# Patient Record
Sex: FEMALE | Race: WHITE | NOT HISPANIC OR LATINO | Employment: OTHER | URBAN - METROPOLITAN AREA
[De-identification: names, ages, dates, MRNs, and addresses within clinical notes are randomized per-mention and may not be internally consistent; named-entity substitution may affect disease eponyms.]

---

## 2017-07-19 ENCOUNTER — HOSPITAL ENCOUNTER (OUTPATIENT)
Dept: MAMMOGRAPHY | Facility: HOSPITAL | Age: 67
Discharge: HOME | End: 2017-07-19
Attending: FAMILY MEDICINE | Admitting: FAMILY MEDICINE

## 2017-08-02 ENCOUNTER — HOSPITAL ENCOUNTER (OUTPATIENT)
Dept: FAMILY MEDICINE CLINIC | Facility: CLINIC | Age: 67
Setting detail: SPECIMEN
Discharge: HOME | End: 2017-08-02
Attending: FAMILY MEDICINE | Admitting: FAMILY MEDICINE

## 2017-08-02 LAB
ALBUMIN SERPL-MCNC: 4.1 G/DL (ref 3.5–4.8)
ALBUMIN/GLOB SERPL: 1.5 {RATIO} (ref 1–1.7)
ALP SERPL-CCNC: 69 IU/L (ref 32–91)
ALT SERPL-CCNC: 43 IU/L (ref 14–54)
ANION GAP SERPL CALC-SCNC: 16.2 MMOL/L (ref 10–20)
AST SERPL-CCNC: 42 IU/L (ref 15–41)
BASOPHILS # BLD AUTO: 0 10*3/UL (ref 0–0.2)
BASOPHILS NFR BLD AUTO: 1 % (ref 0–2)
BILIRUB SERPL-MCNC: 0.6 MG/DL (ref 0.3–1.2)
BILIRUB UR QL STRIP: NEGATIVE MG/DL
BUN SERPL-MCNC: 14 MG/DL (ref 8–20)
BUN/CREAT SERPL: 23.3 (ref 5.4–26.2)
CALCIUM SERPL-MCNC: 9.7 MG/DL (ref 8.9–10.3)
CASTS URNS QL MICRO: NORMAL /[LPF]
CHLORIDE SERPL-SCNC: 100 MMOL/L (ref 101–111)
CHOLEST SERPL-MCNC: 217 MG/DL
CHOLEST/HDLC SERPL: 2.5 {RATIO}
COLOR UR: YELLOW
CONV BACTERIA IN URINE MICRO: NEGATIVE
CONV CLARITY OF URINE: CLEAR
CONV CO2: 31 MMOL/L (ref 22–32)
CONV HYALINE CASTS IN URINE MICRO: 0 /[LPF] (ref 0–5)
CONV LDL CHOLESTEROL DIRECT: 123 MG/DL (ref 0–100)
CONV PROTEIN IN URINE BY AUTOMATED TEST STRIP: NEGATIVE MG/DL
CONV SMALL ROUND CELLS: NORMAL /[HPF]
CONV TOTAL PROTEIN: 6.8 G/DL (ref 6.1–7.9)
CONV UROBILINOGEN IN URINE BY AUTOMATED TEST STRIP: 0.2 MG/DL
CREAT UR-MCNC: 0.6 MG/DL (ref 0.4–1)
CULTURE INDICATED?: NORMAL
DIFFERENTIAL METHOD BLD: (no result)
EOSINOPHIL # BLD AUTO: 0.2 10*3/UL (ref 0–0.3)
EOSINOPHIL # BLD AUTO: 4 % (ref 0–3)
ERYTHROCYTE [DISTWIDTH] IN BLOOD BY AUTOMATED COUNT: 14.3 % (ref 11.5–14.5)
ERYTHROCYTE [SEDIMENTATION RATE] IN BLOOD BY WESTERGREN METHOD: 32 MM/HR (ref 0–30)
GLOBULIN UR ELPH-MCNC: 2.7 G/DL (ref 2.5–3.8)
GLUCOSE SERPL-MCNC: 95 MG/DL (ref 65–99)
GLUCOSE UR QL: NEGATIVE MG/DL
HCT VFR BLD AUTO: 41.3 % (ref 35–49)
HDLC SERPL-MCNC: 86 MG/DL
HGB BLD-MCNC: 13.8 G/DL (ref 12–15)
HGB UR QL STRIP: NEGATIVE
KETONES UR QL STRIP: NEGATIVE MG/DL
LDLC/HDLC SERPL: 1.4 {RATIO}
LEUKOCYTE ESTERASE UR QL STRIP: NEGATIVE
LIPID INTERPRETATION: ABNORMAL
LYMPHOCYTES # BLD AUTO: 1.2 10*3/UL (ref 0.8–4.8)
LYMPHOCYTES NFR BLD AUTO: 27 % (ref 18–42)
MCH RBC QN AUTO: 34.1 PG (ref 26–32)
MCHC RBC AUTO-ENTMCNC: 33.4 G/DL (ref 32–36)
MCV RBC AUTO: 102.3 FL (ref 80–94)
MONOCYTES # BLD AUTO: 0.6 10*3/UL (ref 0.1–1.3)
MONOCYTES NFR BLD AUTO: 14 % (ref 2–11)
NEUTROPHILS # BLD AUTO: 2.4 10*3/UL (ref 2.3–8.6)
NEUTROPHILS NFR BLD AUTO: 54 % (ref 50–75)
NITRITE UR QL STRIP: NEGATIVE
NRBC BLD AUTO-RTO: 0 /100{WBCS}
NRBC/RBC NFR BLD MANUAL: 0 10*3/UL
PH UR STRIP.AUTO: 5.5 [PH] (ref 4.5–8)
PLATELET # BLD AUTO: 202 10*3/UL (ref 150–450)
PMV BLD AUTO: 8.2 FL (ref 7.4–10.4)
POTASSIUM SERPL-SCNC: 4.2 MMOL/L (ref 3.6–5.1)
RBC # BLD AUTO: 4.04 10*6/UL (ref 4–5.4)
RBC #/AREA URNS HPF: 0 /[HPF] (ref 0–3)
SODIUM SERPL-SCNC: 143 MMOL/L (ref 136–144)
SP GR UR: 1.01 (ref 1–1.03)
SPERM URNS QL MICRO: NORMAL /[HPF]
SQUAMOUS SPT QL MICRO: 1 /[HPF] (ref 0–5)
T4 FREE SERPL-MCNC: 0.86 NG/DL (ref 0.58–1.64)
TRIGL SERPL-MCNC: 94 MG/DL
TSH SERPL-ACNC: 3.18 UIU/ML (ref 0.34–5.6)
UNIDENT CRYS URNS QL MICRO: NORMAL /[HPF]
VIT B12 SERPL-MCNC: 568 PG/ML (ref 180–914)
VLDLC SERPL CALC-MCNC: 8.5 MG/DL
WBC # BLD AUTO: 4.4 10*3/UL (ref 4.5–11.5)
WBC #/AREA URNS HPF: 1 /[HPF] (ref 0–5)
YEAST SPEC QL WET PREP: NORMAL /[HPF]

## 2017-08-30 ENCOUNTER — ON CAMPUS - OUTPATIENT (AMBULATORY)
Dept: URBAN - METROPOLITAN AREA HOSPITAL 2 | Facility: HOSPITAL | Age: 67
End: 2017-08-30
Payer: COMMERCIAL

## 2017-08-30 ENCOUNTER — HOSPITAL ENCOUNTER (OUTPATIENT)
Dept: OTHER | Facility: HOSPITAL | Age: 67
Setting detail: SPECIMEN
Discharge: HOME | End: 2017-08-30
Attending: INTERNAL MEDICINE | Admitting: INTERNAL MEDICINE

## 2017-08-30 ENCOUNTER — OFFICE (AMBULATORY)
Dept: URBAN - METROPOLITAN AREA CLINIC 64 | Facility: CLINIC | Age: 67
End: 2017-08-30
Payer: COMMERCIAL

## 2017-08-30 VITALS
SYSTOLIC BLOOD PRESSURE: 136 MMHG | HEART RATE: 80 BPM | SYSTOLIC BLOOD PRESSURE: 130 MMHG | SYSTOLIC BLOOD PRESSURE: 127 MMHG | OXYGEN SATURATION: 99 % | DIASTOLIC BLOOD PRESSURE: 77 MMHG | DIASTOLIC BLOOD PRESSURE: 82 MMHG | WEIGHT: 148 LBS | HEART RATE: 86 BPM | RESPIRATION RATE: 15 BRPM | DIASTOLIC BLOOD PRESSURE: 72 MMHG | HEART RATE: 82 BPM | DIASTOLIC BLOOD PRESSURE: 84 MMHG | HEART RATE: 78 BPM | RESPIRATION RATE: 18 BRPM | OXYGEN SATURATION: 93 % | HEART RATE: 77 BPM | SYSTOLIC BLOOD PRESSURE: 148 MMHG | TEMPERATURE: 97.2 F | HEART RATE: 79 BPM | OXYGEN SATURATION: 96 % | OXYGEN SATURATION: 98 % | SYSTOLIC BLOOD PRESSURE: 137 MMHG | HEART RATE: 74 BPM | SYSTOLIC BLOOD PRESSURE: 122 MMHG | OXYGEN SATURATION: 97 % | HEIGHT: 61 IN | OXYGEN SATURATION: 94 % | SYSTOLIC BLOOD PRESSURE: 119 MMHG | SYSTOLIC BLOOD PRESSURE: 131 MMHG | RESPIRATION RATE: 16 BRPM | OXYGEN SATURATION: 92 % | DIASTOLIC BLOOD PRESSURE: 68 MMHG | DIASTOLIC BLOOD PRESSURE: 65 MMHG | DIASTOLIC BLOOD PRESSURE: 75 MMHG | SYSTOLIC BLOOD PRESSURE: 117 MMHG | OXYGEN SATURATION: 90 %

## 2017-08-30 DIAGNOSIS — K57.30 DIVERTICULOSIS OF LARGE INTESTINE WITHOUT PERFORATION OR ABS: ICD-10-CM

## 2017-08-30 DIAGNOSIS — Z12.11 ENCOUNTER FOR SCREENING FOR MALIGNANT NEOPLASM OF COLON: ICD-10-CM

## 2017-08-30 DIAGNOSIS — D12.5 BENIGN NEOPLASM OF SIGMOID COLON: ICD-10-CM

## 2017-08-30 DIAGNOSIS — D12.2 BENIGN NEOPLASM OF ASCENDING COLON: ICD-10-CM

## 2017-08-30 DIAGNOSIS — D12.3 BENIGN NEOPLASM OF TRANSVERSE COLON: ICD-10-CM

## 2017-08-30 LAB
GI HISTOLOGY: A. UNSPECIFIED: (no result)
GI HISTOLOGY: B. UNSPECIFIED: (no result)
GI HISTOLOGY: C. UNSPECIFIED: (no result)
GI HISTOLOGY: D. UNSPECIFIED: (no result)
GI HISTOLOGY: PDF REPORT: (no result)

## 2017-08-30 PROCEDURE — 88305 TISSUE EXAM BY PATHOLOGIST: CPT | Mod: 26 | Performed by: INTERNAL MEDICINE

## 2017-08-30 PROCEDURE — 45380 COLONOSCOPY AND BIOPSY: CPT | Mod: 59 | Performed by: INTERNAL MEDICINE

## 2017-08-30 PROCEDURE — 45385 COLONOSCOPY W/LESION REMOVAL: CPT | Mod: PT | Performed by: INTERNAL MEDICINE

## 2017-08-30 RX ADMIN — PROPOFOL: 10 INJECTION, EMULSION INTRAVENOUS at 08:14

## 2017-10-02 ENCOUNTER — HOSPITAL ENCOUNTER (OUTPATIENT)
Dept: OTHER | Facility: HOSPITAL | Age: 67
Setting detail: SPECIMEN
Discharge: HOME | End: 2017-10-02
Attending: OTOLARYNGOLOGY | Admitting: OTOLARYNGOLOGY

## 2018-05-30 ENCOUNTER — HOSPITAL ENCOUNTER (OUTPATIENT)
Dept: CT IMAGING | Facility: HOSPITAL | Age: 68
Discharge: HOME | End: 2018-05-30
Attending: FAMILY MEDICINE | Admitting: FAMILY MEDICINE

## 2018-05-30 LAB — CREAT BLDA-MCNC: 0.6 MG/DL (ref 0.6–1.3)

## 2018-06-06 ENCOUNTER — HOSPITAL ENCOUNTER (OUTPATIENT)
Dept: FAMILY MEDICINE CLINIC | Facility: CLINIC | Age: 68
Setting detail: SPECIMEN
Discharge: HOME | End: 2018-06-06
Attending: HOSPITALIST | Admitting: HOSPITALIST

## 2018-06-06 LAB
ALBUMIN SERPL-MCNC: 4.1 G/DL (ref 3.5–4.8)
ALBUMIN/GLOB SERPL: 1.5 {RATIO} (ref 1–1.7)
ALP SERPL-CCNC: 79 IU/L (ref 32–91)
ALT SERPL-CCNC: 24 IU/L (ref 14–54)
ANION GAP SERPL CALC-SCNC: 14.4 MMOL/L (ref 10–20)
AST SERPL-CCNC: 28 IU/L (ref 15–41)
BASOPHILS # BLD AUTO: 0.1 10*3/UL (ref 0–0.2)
BASOPHILS NFR BLD AUTO: 1 % (ref 0–2)
BILIRUB SERPL-MCNC: 0.5 MG/DL (ref 0.3–1.2)
BUN SERPL-MCNC: 14 MG/DL (ref 8–20)
BUN/CREAT SERPL: 23.3 (ref 5.4–26.2)
CALCIUM SERPL-MCNC: 9.1 MG/DL (ref 8.9–10.3)
CASTS URNS QL MICRO: NORMAL /[LPF]
CHLORIDE SERPL-SCNC: 100 MMOL/L (ref 101–111)
CHOLEST SERPL-MCNC: 251 MG/DL
CHOLEST/HDLC SERPL: 3.7 {RATIO}
CONV BACTERIA IN URINE MICRO: NEGATIVE
CONV CO2: 29 MMOL/L (ref 22–32)
CONV HYALINE CASTS IN URINE MICRO: 3 /[LPF] (ref 0–5)
CONV LDL CHOLESTEROL DIRECT: 144 MG/DL (ref 0–100)
CONV SMALL ROUND CELLS: NORMAL /[HPF]
CONV TOTAL PROTEIN: 6.8 G/DL (ref 6.1–7.9)
CREAT UR-MCNC: 0.6 MG/DL (ref 0.4–1)
DIFFERENTIAL METHOD BLD: (no result)
EOSINOPHIL # BLD AUTO: 0.2 10*3/UL (ref 0–0.3)
EOSINOPHIL # BLD AUTO: 4 % (ref 0–3)
ERYTHROCYTE [DISTWIDTH] IN BLOOD BY AUTOMATED COUNT: 13.2 % (ref 11.5–14.5)
GLOBULIN UR ELPH-MCNC: 2.7 G/DL (ref 2.5–3.8)
GLUCOSE SERPL-MCNC: 103 MG/DL (ref 65–99)
HCT VFR BLD AUTO: 39.9 % (ref 35–49)
HDLC SERPL-MCNC: 68 MG/DL
HGB BLD-MCNC: 13.6 G/DL (ref 12–15)
LDLC/HDLC SERPL: 2.1 {RATIO}
LIPID INTERPRETATION: ABNORMAL
LYMPHOCYTES # BLD AUTO: 1.1 10*3/UL (ref 0.8–4.8)
LYMPHOCYTES NFR BLD AUTO: 24 % (ref 18–42)
MCH RBC QN AUTO: 34 PG (ref 26–32)
MCHC RBC AUTO-ENTMCNC: 34.1 G/DL (ref 32–36)
MCV RBC AUTO: 99.7 FL (ref 80–94)
MONOCYTES # BLD AUTO: 0.8 10*3/UL (ref 0.1–1.3)
MONOCYTES NFR BLD AUTO: 16 % (ref 2–11)
NEUTROPHILS # BLD AUTO: 2.7 10*3/UL (ref 2.3–8.6)
NEUTROPHILS NFR BLD AUTO: 55 % (ref 50–75)
NRBC BLD AUTO-RTO: 0 /100{WBCS}
NRBC/RBC NFR BLD MANUAL: 0 10*3/UL
PLATELET # BLD AUTO: 197 10*3/UL (ref 150–450)
PMV BLD AUTO: 7.6 FL (ref 7.4–10.4)
POTASSIUM SERPL-SCNC: 3.4 MMOL/L (ref 3.6–5.1)
RBC # BLD AUTO: 4 10*6/UL (ref 4–5.4)
RBC #/AREA URNS HPF: 1 /[HPF] (ref 0–3)
SODIUM SERPL-SCNC: 140 MMOL/L (ref 136–144)
SPERM URNS QL MICRO: NORMAL /[HPF]
SQUAMOUS SPT QL MICRO: 3 /[HPF] (ref 0–5)
TRIGL SERPL-MCNC: 255 MG/DL
UNIDENT CRYS URNS QL MICRO: NORMAL /[HPF]
VLDLC SERPL CALC-MCNC: 39.4 MG/DL
WBC # BLD AUTO: 4.9 10*3/UL (ref 4.5–11.5)
WBC #/AREA URNS HPF: 1 /[HPF] (ref 0–5)
YEAST SPEC QL WET PREP: NORMAL /[HPF]

## 2018-08-07 ENCOUNTER — HOSPITAL ENCOUNTER (OUTPATIENT)
Dept: MAMMOGRAPHY | Facility: HOSPITAL | Age: 68
Discharge: HOME | End: 2018-08-07

## 2019-08-12 ENCOUNTER — TRANSCRIBE ORDERS (OUTPATIENT)
Dept: MAMMOGRAPHY | Facility: HOSPITAL | Age: 69
End: 2019-08-12

## 2019-08-12 DIAGNOSIS — Z12.39 SCREENING BREAST EXAMINATION: Primary | ICD-10-CM

## 2019-08-19 ENCOUNTER — HOSPITAL ENCOUNTER (OUTPATIENT)
Dept: MAMMOGRAPHY | Facility: HOSPITAL | Age: 69
Discharge: HOME | End: 2019-08-19
Admitting: FAMILY MEDICINE

## 2019-08-19 DIAGNOSIS — Z12.39 SCREENING BREAST EXAMINATION: ICD-10-CM

## 2019-08-19 PROCEDURE — 77063 BREAST TOMOSYNTHESIS BI: CPT

## 2019-08-19 PROCEDURE — 77067 SCR MAMMO BI INCL CAD: CPT

## 2019-08-21 ENCOUNTER — OFFICE VISIT (OUTPATIENT)
Dept: FAMILY MEDICINE CLINIC | Facility: CLINIC | Age: 69
End: 2019-08-21

## 2019-08-21 VITALS
HEIGHT: 62 IN | SYSTOLIC BLOOD PRESSURE: 124 MMHG | DIASTOLIC BLOOD PRESSURE: 88 MMHG | WEIGHT: 172 LBS | TEMPERATURE: 98.2 F | OXYGEN SATURATION: 91 % | BODY MASS INDEX: 31.65 KG/M2 | HEART RATE: 89 BPM | RESPIRATION RATE: 16 BRPM

## 2019-08-21 DIAGNOSIS — I10 ESSENTIAL HYPERTENSION: ICD-10-CM

## 2019-08-21 DIAGNOSIS — E78.2 MIXED HYPERLIPIDEMIA: ICD-10-CM

## 2019-08-21 DIAGNOSIS — M1A.0720 CHRONIC IDIOPATHIC GOUT INVOLVING TOE OF LEFT FOOT WITHOUT TOPHUS: ICD-10-CM

## 2019-08-21 DIAGNOSIS — I87.2 PERIPHERAL VENOUS INSUFFICIENCY: ICD-10-CM

## 2019-08-21 DIAGNOSIS — J68.3 REACTIVE AIRWAYS DYSFUNCTION SYNDROME (HCC): ICD-10-CM

## 2019-08-21 DIAGNOSIS — Z00.00 MEDICARE ANNUAL WELLNESS VISIT, SUBSEQUENT: Primary | ICD-10-CM

## 2019-08-21 DIAGNOSIS — R79.9 ABNORMAL FINDING OF BLOOD CHEMISTRY: ICD-10-CM

## 2019-08-21 PROBLEM — H61.23 BILATERAL IMPACTED CERUMEN: Status: ACTIVE | Noted: 2017-04-10

## 2019-08-21 PROBLEM — E78.5 HYPERLIPIDEMIA: Status: ACTIVE | Noted: 2019-08-21

## 2019-08-21 PROBLEM — N60.19 FIBROCYSTIC BREAST: Status: ACTIVE | Noted: 2019-08-21

## 2019-08-21 PROBLEM — K57.92 DIVERTICULITIS: Status: ACTIVE | Noted: 2018-05-16

## 2019-08-21 PROCEDURE — 84550 ASSAY OF BLOOD/URIC ACID: CPT | Performed by: FAMILY MEDICINE

## 2019-08-21 PROCEDURE — G0439 PPPS, SUBSEQ VISIT: HCPCS | Performed by: FAMILY MEDICINE

## 2019-08-21 PROCEDURE — 99213 OFFICE O/P EST LOW 20 MIN: CPT | Performed by: FAMILY MEDICINE

## 2019-08-21 PROCEDURE — 82607 VITAMIN B-12: CPT | Performed by: FAMILY MEDICINE

## 2019-08-21 PROCEDURE — 80061 LIPID PANEL: CPT | Performed by: FAMILY MEDICINE

## 2019-08-21 PROCEDURE — 83036 HEMOGLOBIN GLYCOSYLATED A1C: CPT | Performed by: FAMILY MEDICINE

## 2019-08-21 RX ORDER — CITALOPRAM 20 MG/1
TABLET ORAL
COMMUNITY
Start: 2019-05-18 | End: 2020-04-10

## 2019-08-21 RX ORDER — OMEPRAZOLE 40 MG/1
CAPSULE, DELAYED RELEASE ORAL
COMMUNITY
Start: 2018-05-16 | End: 2019-12-10

## 2019-08-21 RX ORDER — ALLOPURINOL 300 MG/1
TABLET ORAL
COMMUNITY
Start: 2019-06-10 | End: 2020-01-06

## 2019-08-21 RX ORDER — RANITIDINE 300 MG/1
TABLET ORAL
COMMUNITY
Start: 2019-06-10 | End: 2019-12-10

## 2019-08-21 RX ORDER — METOPROLOL SUCCINATE 100 MG/1
TABLET, EXTENDED RELEASE ORAL
COMMUNITY
Start: 2019-08-16 | End: 2020-01-06

## 2019-08-21 RX ORDER — ATORVASTATIN CALCIUM 20 MG/1
TABLET, FILM COATED ORAL
COMMUNITY
Start: 2019-05-18 | End: 2020-04-10

## 2019-08-21 RX ORDER — POTASSIUM CHLORIDE 750 MG/1
20 TABLET, FILM COATED, EXTENDED RELEASE ORAL 2 TIMES DAILY
COMMUNITY
End: 2021-08-27

## 2019-08-21 RX ORDER — FUROSEMIDE 40 MG/1
TABLET ORAL
COMMUNITY
Start: 2018-02-03 | End: 2020-07-22

## 2019-08-21 RX ORDER — MONTELUKAST SODIUM 10 MG/1
TABLET ORAL
COMMUNITY
Start: 2019-06-10 | End: 2020-01-06

## 2019-08-21 RX ORDER — BUDESONIDE AND FORMOTEROL FUMARATE DIHYDRATE 160; 4.5 UG/1; UG/1
AEROSOL RESPIRATORY (INHALATION)
COMMUNITY
Start: 2018-02-03 | End: 2020-11-19 | Stop reason: SDUPTHER

## 2019-08-21 NOTE — PROGRESS NOTES
The ABCs of the Annual Wellness Visit  Subsequent Medicare Wellness Visit    Chief Complaint   Patient presents with   • Medicare Wellness-subsequent       Subjective   History of Present Illness:  Lori Sutton is a 69 y.o. female who presents for a Subsequent Medicare Wellness Visit.  She is generally had a very good year.  Her screening interview today indicates an occasional day or 2 when she feels lonely and may be a little down in the depression stage but she quickly comes out of that within a day or 2.  She is  and would naturally be lonely at times.  She has many friends though some of them are also  and they have become very active with card groups and they are starting to travel together and more and more activities are being put together for them.  She is doing better since her social life has improved.  Her mammogram and colonoscopy and labs were all up-to-date.  Immunizations will be up-to-date after today.        Patient is also here today for a physical exam and to review of the medical problems that we follow her for in the office.This includes hypertension, hyperlipidemia, peripheral venous insufficiency, chronic pulmonary disease from previous cigarette smoking, reactive airway disease, irritable bowel syndrome, degenerative joint disease, fibrocystic breast, and gout.  She will have an exam today and we will draw some laboratory test to evaluate her medical problems and make adjustments in her medicines.  We will review her medicines today.  Even though she has extensive medical problems as listed above she has quit smoking and she generally feels well.  Recently she had some procedures done on her legs in an attempt to redirect her venous return.  She did this on her own and I am not sure what group she is actually using.  Apparently they are using some form of ultrasound to redirect venous flow.  I told her that she can finish the treatment and then if it does not seem to help we  will ask her to see our vascular surgeons and see if they can help her with the same problem.  If it does help then we will appreciate their work.      HEALTH RISK ASSESSMENT    Recent Hospitalizations:  No hospitalization(s) within the last year.    Current Medical Providers:  Patient Care Team:  Ayaz Luna MD as PCP - General  Keya Ash APRN as PCP - Amanda Gonzalez MD as Consulting Physician (Plastic Surgery)    Smoking Status:  Social History     Tobacco Use   Smoking Status Former Smoker   • Years: 40.00   • Last attempt to quit: 2006   • Years since quittin.6   Smokeless Tobacco Never Used       Alcohol Consumption:  Social History     Substance and Sexual Activity   Alcohol Use Yes    Comment: daily, vodka tonic       Depression Screen:   PHQ-2/PHQ-9 Depression Screening 2019   Little interest or pleasure in doing things 0   Feeling down, depressed, or hopeless 0   Total Score 0       Fall Risk Screen:  JHOANA Fall Risk Assessment was completed, and patient is at MODERATE risk for falls. Assessment completed on:2019    Health Habits and Functional and Cognitive Screening:  Functional & Cognitive Status 2019   Do you have difficulty preparing food and eating? No   Do you have difficulty bathing yourself, getting dressed or grooming yourself? No   Do you have difficulty using the toilet? No   Do you have trouble with steps or getting out of a bed or a chair? No   Current Diet Well Balanced Diet   Dental Exam Up to date   Eye Exam Up to date   Exercise (times per week) 3 times per week   Current Exercise Activities Include Housecleaning   Do you need help using the phone?  No   Are you deaf or do you have serious difficulty hearing?  Yes   Do you need help with transportation? No   Do you need help shopping? No   Do you need help preparing meals?  No   Do you need help with housework?  No   Do you need help with laundry? No   Do you need help  taking your medications? No   Do you need help managing money? No   Do you ever drive or ride in a car without wearing a seat belt? No   Have you felt unusual stress, anger or loneliness in the last month? Yes   Who do you live with? Alone   If you need help, do you have trouble finding someone available to you? No   Have you been bothered in the last four weeks by sexual problems? No   Do you have difficulty concentrating, remembering or making decisions? No         Does the patient have evidence of cognitive impairment? No    Asprin use counseling:Does not need ASA (and currently is not on it)    Age-appropriate Screening Schedule:  Refer to the list below for future screening recommendations based on patient's age, sex and/or medical conditions. Orders for these recommended tests are listed in the plan section. The patient has been provided with a written plan.    Health Maintenance   Topic Date Due   • TDAP/TD VACCINES (1 - Tdap) 02/08/1969   • PNEUMOCOCCAL VACCINES (65+ LOW/MEDIUM RISK) (2 of 2 - PPSV23) 05/16/2017   • LIPID PANEL  08/21/2019   • INFLUENZA VACCINE  08/01/2019   • ZOSTER VACCINE (2 of 2) 09/18/2019   • COLONOSCOPY  08/30/2027          The following portions of the patient's history were reviewed and updated as appropriate: allergies, current medications, past family history, past medical history, past social history, past surgical history and problem list.    Outpatient Medications Prior to Visit   Medication Sig Dispense Refill   • allopurinol (ZYLOPRIM) 300 MG tablet      • atorvastatin (LIPITOR) 20 MG tablet      • B Complex Vitamins (VITAMIN B COMPLEX PO) VITAMIN B COMPLEX TABS     • budesonide-formoterol (SYMBICORT) 160-4.5 MCG/ACT inhaler SYMBICORT 160-4.5 MCG/ACT AERO     • Cholecalciferol 1000 units tablet VITAMIN D TABS     • citalopram (CeleXA) 20 MG tablet      • metoprolol succinate XL (TOPROL-XL) 100 MG 24 hr tablet      • montelukast (SINGULAIR) 10 MG tablet      • omeprazole  "(priLOSEC) 40 MG capsule OMEPRAZOLE 40 MG CPDR     • raNITIdine (ZANTAC) 300 MG tablet      • vitamin E 100 UNIT capsule VITAMIN E CAPS     • furosemide (LASIX) 40 MG tablet FUROSEMIDE 40 MG TABS     • potassium chloride (K-DUR) 10 MEQ CR tablet Take 20 mEq by mouth 2 (Two) Times a Day.       No facility-administered medications prior to visit.        Patient Active Problem List   Diagnosis   • Medicare annual wellness visit, subsequent   • Hypertension   • Hyperlipidemia   • Fibrocystic breast   • Back pain   • Bilateral impacted cerumen   • Colon polyps   • Depression   • Diverticulitis   • Edema   • Gout   • Irritable bowel syndrome   • Lymphedema of lower extremity   • Peripheral venous insufficiency   • Polyosteoarthritis   • Reactive airways dysfunction syndrome (CMS/HCC)       Advanced Care Planning:  Patient has an advance directive - a copy has been provided and is visible in patient header    Review of Systems   Constitutional: Negative.    All other systems reviewed and are negative.      Compared to one year ago, the patient feels her physical health is worse.  Compared to one year ago, the patient feels her mental health is the same.    Reviewed chart for potential of high risk medication in the elderly: yes  Reviewed chart for potential of harmful drug interactions in the elderly:yes    Objective         Vitals:    08/21/19 1552   BP: 124/88   BP Location: Right arm   Cuff Size: Large Adult   Pulse: 89   Resp: 16   Temp: 98.2 °F (36.8 °C)   TempSrc: Oral   SpO2: 91%   Weight: 78 kg (172 lb)   Height: 157.5 cm (62\")   PainSc: 0-No pain       Body mass index is 31.46 kg/m².  Discussed the patient's BMI with her. The BMI is above average; BMI management plan is completed.    Physical Exam   Constitutional: She is oriented to person, place, and time. She appears well-developed and well-nourished.   HENT:   Head: Normocephalic and atraumatic.   Right Ear: External ear normal.   Left Ear: External ear " normal.   Nose: Nose normal.   Mouth/Throat: Oropharynx is clear and moist.   Eyes: Conjunctivae and EOM are normal. Pupils are equal, round, and reactive to light.   Neck: Normal range of motion.   Cardiovascular: Normal rate, regular rhythm, normal heart sounds and intact distal pulses.   Pulmonary/Chest: Effort normal and breath sounds normal.   Abdominal: Soft. Bowel sounds are normal.   Musculoskeletal: Normal range of motion.   Patient has osteoarthritic changes in her hands hips knees ankles and feet.  She has varicose veins in both of her legs along with edema.  She has varicose veins causing a certain degree of venous stasis in her lower extremities.  She has no ulcerations.   Neurological: She is alert and oriented to person, place, and time.   Skin: Skin is warm and dry.   Psychiatric: She has a normal mood and affect. Her behavior is normal. Judgment and thought content normal.   Vitals reviewed.            Assessment/Plan   Medicare Risks and Personalized Health Plan  CMS Preventative Services Quick Reference  Alcohol Misuse  Breast Cancer/Mammogram Screening  Colon Cancer Screening  Obesity/Overweight     The above risks/problems have been discussed with the patient.  Pertinent information has been shared with the patient in the After Visit Summary.  Follow up plans and orders are seen below in the Assessment/Plan Section.    Diagnoses and all orders for this visit:    1. Medicare annual wellness visit, subsequent (Primary)  Assessment & Plan:  Patient is here today for her Medicare wellness exam.  We have reviewed her past medical history and have gone through the various screens required for this exam.  She will be up-to-date after today for the exam itself and labs will be drawn today.  She will be up-to-date with her colonoscopy and with her mammogram.  Immunizations have been reviewed today and plans have been made to update and catch her up in this area.  Overall she is doing very well and is  staying active and involved with life.    Orders:  -     CT Chest Without Contrast; Future  -     TSH; Future  -     T4, Free; Future  -     Vitamin B12  -     Comprehensive Metabolic Panel; Future  -     CBC & Differential; Future  -     Lipid Panel  -     Hemoglobin A1c  -     Uric Acid    2. Mixed hyperlipidemia  Assessment & Plan:  Lipid abnormalities are improving with treatment.  Nutritional counseling was provided., The patient was referred to the dietician. and Pharmacotherapy as ordered.  Lipids will be reassessed in 1 year.  Continue atorvastatin 20 mg a day    Orders:  -     CT Chest Without Contrast; Future  -     TSH; Future  -     T4, Free; Future  -     Vitamin B12  -     Comprehensive Metabolic Panel; Future  -     CBC & Differential; Future  -     Lipid Panel  -     Hemoglobin A1c  -     Uric Acid    3. Essential hypertension  Assessment & Plan:  Hypertension is improving with treatment.  Continue current treatment regimen.  Dietary sodium restriction.  Weight loss.  Regular aerobic exercise.  Stop smoking.  Continue current medications.  Blood pressure will be reassessed at the next regular appointment.  Continue blood pressure medications in the form of metoprolol succinate 100 mg a day    Orders:  -     CT Chest Without Contrast; Future  -     TSH; Future  -     T4, Free; Future  -     Vitamin B12  -     Comprehensive Metabolic Panel; Future  -     CBC & Differential; Future  -     Lipid Panel  -     Hemoglobin A1c  -     Uric Acid    4. Peripheral venous insufficiency  Assessment & Plan:  Weight loss  Compression stockings  Elevate legs  Patient is undergoing a procedure to redirect venous flow.  If this is successful she should see some improvement in the next 4 to 6 months.    Orders:  -     CT Chest Without Contrast; Future  -     TSH; Future  -     T4, Free; Future  -     Vitamin B12  -     Comprehensive Metabolic Panel; Future  -     CBC & Differential; Future  -     Lipid Panel  -      Hemoglobin A1c  -     Uric Acid    5. Reactive airways dysfunction syndrome (CMS/HCC)  Assessment & Plan:  Patient no longer smokes so her airways are healing.  She continues to have some wheezing.  She takes Singulair 10 mg a day and she takes Symbicort inhaler twice a day.  We will order a screening CT scan without contrast because of her previous history of cigarette smoking.  She is accumulated a 40-pack-year history.  She is a candidate for the CT screening.    Orders:  -     CT Chest Without Contrast; Future  -     TSH; Future  -     T4, Free; Future  -     Vitamin B12  -     Comprehensive Metabolic Panel; Future  -     CBC & Differential; Future  -     Lipid Panel  -     Hemoglobin A1c  -     Uric Acid    6. Chronic idiopathic gout involving toe of left foot without tophus  Assessment & Plan:  Patient has a history of gout and is currently taking allopurinol.  Since she has been on that she has not had any gout attacks.    Orders:  -     CT Chest Without Contrast; Future  -     TSH; Future  -     T4, Free; Future  -     Vitamin B12  -     Comprehensive Metabolic Panel; Future  -     CBC & Differential; Future  -     Lipid Panel  -     Hemoglobin A1c  -     Uric Acid    7. Abnormal finding of blood chemistry   -     Hemoglobin A1c    Follow Up:  Return in about 1 year (around 8/21/2020) for Medicare Wellness.     An After Visit Summary and PPPS were given to the patient.

## 2019-08-21 NOTE — ASSESSMENT & PLAN NOTE
Patient has a history of gout and is currently taking allopurinol.  Since she has been on that she has not had any gout attacks.

## 2019-08-21 NOTE — ASSESSMENT & PLAN NOTE
Weight loss  Compression stockings  Elevate legs  Patient is undergoing a procedure to redirect venous flow.  If this is successful she should see some improvement in the next 4 to 6 months.

## 2019-08-21 NOTE — ASSESSMENT & PLAN NOTE
Patient is here today for her Medicare wellness exam.  We have reviewed her past medical history and have gone through the various screens required for this exam.  She will be up-to-date after today for the exam itself and labs will be drawn today.  She will be up-to-date with her colonoscopy and with her mammogram.  Immunizations have been reviewed today and plans have been made to update and catch her up in this area.  Overall she is doing very well and is staying active and involved with life.

## 2019-08-21 NOTE — ASSESSMENT & PLAN NOTE
Patient no longer smokes so her airways are healing.  She continues to have some wheezing.  She takes Singulair 10 mg a day and she takes Symbicort inhaler twice a day.  We will order a screening CT scan without contrast because of her previous history of cigarette smoking.  She is accumulated a 40-pack-year history.  She is a candidate for the CT screening.

## 2019-08-21 NOTE — ASSESSMENT & PLAN NOTE
Hypertension is improving with treatment.  Continue current treatment regimen.  Dietary sodium restriction.  Weight loss.  Regular aerobic exercise.  Stop smoking.  Continue current medications.  Blood pressure will be reassessed at the next regular appointment.  Continue blood pressure medications in the form of metoprolol succinate 100 mg a day

## 2019-08-21 NOTE — PATIENT INSTRUCTIONS
Medicare Wellness  Personal Prevention Plan of Service     Date of Office Visit:  2019  Encounter Provider:  Ayaz Luna MD  Place of Service:  [facility]  Patient Name: Lori Sutton  :  1950    As part of the Medicare Wellness portion of your visit today, we are providing you with this personalized preventive plan of services (PPPS). This plan is based upon recommendations of the [place] Preventive Services Task Force (USPSTF) and the Advisory Committee on Immunization Practices (ACIP).    This lists the preventive care services that should be considered, and provides dates of when you are due. Items listed as completed are up-to-date and do not require any further intervention.    Health Maintenance   Topic Date Due   • TDAP/TD VACCINES (1 - Tdap) 1967   • PNEUMOCOCCAL VACCINES (65+ LOW/MEDIUM RISK) (2 of 2 - PPSV23) 2017   • HEPATITIS C SCREENING  2019   • MEDICARE ANNUAL WELLNESS  2019   • LIPID PANEL  2019   • INFLUENZA VACCINE  2019   • ZOSTER VACCINE (2 of 2) 2019   • COLONOSCOPY  2027       No orders of the defined types were placed in this encounter.      Return in about 1 year (around 2020) for Medicare Wellness.          Carbohydrate Counting for Diabetes Mellitus, Adult    Carbohydrate counting is a method of keeping track of how many carbohydrates you eat. Eating carbohydrates naturally increases the amount of sugar (glucose) in the blood. Counting how many carbohydrates you eat helps keep your blood glucose within normal limits, which helps you manage your diabetes (diabetes mellitus).  It is important to know how many carbohydrates you can safely have in each meal. This is different for every person. A diet and nutrition specialist (registered dietitian) can help you make a meal plan and calculate how many carbohydrates you should have at each meal and snack.  Carbohydrates are found  "in the following foods:  · Grains, such as breads and cereals.  · Dried beans and soy products.  · Starchy vegetables, such as potatoes, peas, and corn.  · Fruit and fruit juices.  · Milk and yogurt.  · Sweets and snack foods, such as cake, cookies, candy, chips, and soft drinks.  How do I count carbohydrates?  There are two ways to count carbohydrates in food. You can use either of the methods or a combination of both.  Reading \"Nutrition Facts\" on packaged food  The \"Nutrition Facts\" list is included on the labels of almost all packaged foods and beverages in the [place] It includes:  · The serving size.  · Information about nutrients in each serving, including the grams (g) of carbohydrate per serving.  To use the “Nutrition Facts\":  · Decide how many servings you will have.  · Multiply the number of servings by the number of carbohydrates per serving.  · The resulting number is the total amount of carbohydrates that you will be having.  Learning standard serving sizes of other foods  When you eat carbohydrate foods that are not packaged or do not include \"Nutrition Facts\" on the label, you need to measure the servings in order to count the amount of carbohydrates:  · Measure the foods that you will eat with a food scale or measuring cup, if needed.  · Decide how many standard-size servings you will eat.  · Multiply the number of servings by 15. Most carbohydrate-rich foods have about 15 g of carbohydrates per serving.  ? For example, if you eat 8 oz (170 g) of strawberries, you will have eaten 2 servings and 30 g of carbohydrates (2 servings x 15 g = 30 g).  · For foods that have more than one food mixed, such as soups and casseroles, you must count the carbohydrates in each food that is included.  The following list contains standard serving sizes of common carbohydrate-rich foods. Each of these servings has about 15 g of carbohydrates:  · ½ hamburger bun or ½ English muffin.  · ½ oz (15 mL) syrup.  · ½ oz (14 g) " jelly.  · 1 slice of bread.  · 1 six-inch tortilla.  · 3 oz (85 g) cooked rice or pasta.  · 4 oz (113 g) cooked dried beans.  · 4 oz (113 g) starchy vegetable, such as peas, corn, or potatoes.  · 4 oz (113 g) hot cereal.  · 4 oz (113 g) mashed potatoes or ¼ of a large baked potato.  · 4 oz (113 g) canned or frozen fruit.  · 4 oz (120 mL) fruit juice.  · 4-6 crackers.  · 6 chicken nuggets.  · 6 oz (170 g) unsweetened dry cereal.  · 6 oz (170 g) plain fat-free yogurt or yogurt sweetened with artificial sweeteners.  · 8 oz (240 mL) milk.  · 8 oz (170 g) fresh fruit or one small piece of fruit.  · 24 oz (680 g) popped popcorn.  Example of carbohydrate counting  Sample meal  · 3 oz (85 g) chicken breast.  · 6 oz (170 g) brown rice.  · 4 oz (113 g) corn.  · 8 oz (240 mL) milk.  · 8 oz (170 g) strawberries with sugar-free whipped topping.  Carbohydrate calculation  1. Identify the foods that contain carbohydrates:  ? Rice.  ? Corn.  ? Milk.  ? Strawberries.  2. Calculate how many servings you have of each food:  ? 2 servings rice.  ? 1 serving corn.  ? 1 serving milk.  ? 1 serving strawberries.  3. Multiply each number of servings by 15 g:  ? 2 servings rice x 15 g = 30 g.  ? 1 serving corn x 15 g = 15 g.  ? 1 serving milk x 15 g = 15 g.  ? 1 serving strawberries x 15 g = 15 g.  4. Add together all of the amounts to find the total grams of carbohydrates eaten:  ? 30 g + 15 g + 15 g + 15 g = 75 g of carbohydrates total.  Summary  · Carbohydrate counting is a method of keeping track of how many carbohydrates you eat.  · Eating carbohydrates naturally increases the amount of sugar (glucose) in the blood.  · Counting how many carbohydrates you eat helps keep your blood glucose within normal limits, which helps you manage your diabetes.  · A diet and nutrition specialist (registered dietitian) can help you make a meal plan and calculate how many carbohydrates you should have at each meal and snack.  This information is not  intended to replace advice given to you by your health care provider. Make sure you discuss any questions you have with your health care provider.  Document Released: 12/18/2006 Document Revised: 06/27/2018 Document Reviewed: 05/31/2017  [employer] Interactive Patient Education © 2019 Elsevier Inc.    She went to the bathroom  Exercising to Stay Healthy  To become healthy and stay healthy, it is recommended that you do moderate-intensity and vigorous-intensity exercise. You can tell that you are exercising at a moderate intensity if your heart starts beating faster and you start breathing faster but can still hold a conversation. You can tell that you are exercising at a vigorous intensity if you are breathing much harder and faster and cannot hold a conversation while exercising.  Exercising regularly is important. It has many health benefits, such as:  · Improving overall fitness, flexibility, and endurance.  · Increasing bone density.  · Helping with weight control.  · Decreasing body fat.  · Increasing muscle strength.  · Reducing stress and tension.  · Improving overall health.  How often should I exercise?  Choose an activity that you enjoy, and set realistic goals. Your health care provider can help you make an activity plan that works for you.  Exercise regularly as told by your health care provider. This may include:  · Doing strength training two times a week, such as:  ? Lifting weights.  ? Using resistance bands.  ? Push-ups.  ? Sit-ups.  ? Yoga.  · Doing a certain intensity of exercise for a given amount of time. Choose from these options:  ? A total of 150 minutes of moderate-intensity exercise every week.  ? A total of 75 minutes of vigorous-intensity exercise every week.  ? A mix of moderate-intensity and vigorous-intensity exercise every week.  Children, pregnant women, people who have not exercised regularly, people who are overweight, and older adults may need to talk with a health care provider  about what activities are safe to do. If you have a medical condition, be sure to talk with your health care provider before you start a new exercise program.  What are some exercise ideas?  Moderate-intensity exercise ideas include:  · Walking 1 mile (1.6 km) in about 15 minutes.  · Biking.  · Hiking.  · Golfing.  · Dancing.  · Water aerobics.  Vigorous-intensity exercise ideas include:  · Walking 4.5 miles (7.2 km) or more in about 1 hour.  · Jogging or running 5 miles (8 km) in about 1 hour.  · Biking 10 miles (16.1 km) or more in about 1 hour.  · Lap swimming.  · Roller-skating or in-line skating.  · Cross-country skiing.  · Vigorous competitive sports, such as football, basketball, and soccer.  · Jumping rope.  · Aerobic dancing.  What are some everyday activities that can help me to get exercise?  · Yard work, such as:  ? Pushing a .  ? Raking and bagging leaves.  · Washing your car.  · Pushing a stroller.  · Shoveling snow.  · Gardening.  · Washing windows or floors.  How can I be more active in my day-to-day activities?  · Use stairs instead of an elevator.  · Take a walk during your lunch break.  · If you drive, park your car farther away from your work or school.  · If you take public transportation, get off one stop early and walk the rest of the way.  · Stand up or walk around during all of your indoor phone calls.  · Get up, stretch, and walk around every 30 minutes throughout the day.  · Enjoy exercise with a friend. Support to continue exercising will help you keep a regular routine of activity.  What guidelines can I follow while exercising?  · Before you start a new exercise program, talk with your health care provider.  · Do not exercise so much that you hurt yourself, feel dizzy, or get very short of breath.  · Wear comfortable clothes and wear shoes with good support.  · Drink plenty of water while you exercise to prevent dehydration or heat stroke.  · Work out until your breathing and  your heartbeat get faster.  Where to find more information  · [place] Department of Health and Human Services: www.hhs.gov  · Centers for Disease Control and Prevention (CDC): www.cdc.gov  Summary  · Exercising regularly is important. It will improve your overall fitness, flexibility, and endurance.  · Regular exercise also will improve your overall health. It can help you control your weight, reduce stress, and improve your bone density.  · Do not exercise so much that you hurt yourself, feel dizzy, or get very short of breath.  · Before you start a new exercise program, talk with your health care provider.  This information is not intended to replace advice given to you by your health care provider. Make sure you discuss any questions you have with your health care provider.  Document Released: 01/20/2012 Document Revised: 11/08/2018 Document Reviewed: 11/08/2018  [employer] Interactive Patient Education © 2019 Elsevier Inc.

## 2019-08-21 NOTE — ASSESSMENT & PLAN NOTE
Lipid abnormalities are improving with treatment.  Nutritional counseling was provided., The patient was referred to the dietician. and Pharmacotherapy as ordered.  Lipids will be reassessed in 1 year.  Continue atorvastatin 20 mg a day

## 2019-08-22 LAB
ARTICHOKE IGE QN: 106 MG/DL (ref 0–100)
CHOLEST SERPL-MCNC: 200 MG/DL
HBA1C MFR BLD: 5.7 % (ref 3.5–5.6)
HDLC SERPL QL: 2.5
HDLC SERPL-MCNC: 80 MG/DL
LDLC/HDLC SERPL: 1.12 {RATIO}
TRIGL SERPL-MCNC: 154 MG/DL
URATE SERPL-MCNC: 2.9 MG/DL (ref 2.6–8)
VIT B12 BLD-MCNC: 556 PG/ML (ref 180–914)
VLDLC SERPL-MCNC: 30.8 MG/DL

## 2019-08-26 ENCOUNTER — HOSPITAL ENCOUNTER (OUTPATIENT)
Dept: CT IMAGING | Facility: HOSPITAL | Age: 69
Discharge: HOME | End: 2019-08-26
Admitting: FAMILY MEDICINE

## 2019-08-26 DIAGNOSIS — E78.2 MIXED HYPERLIPIDEMIA: ICD-10-CM

## 2019-08-26 DIAGNOSIS — I87.2 PERIPHERAL VENOUS INSUFFICIENCY: ICD-10-CM

## 2019-08-26 DIAGNOSIS — J68.3 REACTIVE AIRWAYS DYSFUNCTION SYNDROME (HCC): ICD-10-CM

## 2019-08-26 DIAGNOSIS — I10 ESSENTIAL HYPERTENSION: ICD-10-CM

## 2019-08-26 DIAGNOSIS — Z00.00 MEDICARE ANNUAL WELLNESS VISIT, SUBSEQUENT: ICD-10-CM

## 2019-08-26 DIAGNOSIS — M1A.0720 CHRONIC IDIOPATHIC GOUT INVOLVING TOE OF LEFT FOOT WITHOUT TOPHUS: ICD-10-CM

## 2019-08-26 PROCEDURE — 71250 CT THORAX DX C-: CPT

## 2019-09-12 ENCOUNTER — TELEPHONE (OUTPATIENT)
Dept: FAMILY MEDICINE CLINIC | Facility: CLINIC | Age: 69
End: 2019-09-12

## 2019-09-12 NOTE — TELEPHONE ENCOUNTER
Patient called stating that she is in [facility], and believes she has thrush. Patient states that she thinks she got thrush from nor cleaning off inhaler. Patient is asking if something can be sent to Pharmacy in [place].     [facility]  [address] [place] [address]  # [phone]

## 2019-09-18 ENCOUNTER — HOSPITAL ENCOUNTER (OUTPATIENT)
Dept: MAMMOGRAPHY | Facility: HOSPITAL | Age: 69
Discharge: HOME | End: 2019-09-18
Admitting: FAMILY MEDICINE

## 2019-09-18 ENCOUNTER — HOSPITAL ENCOUNTER (OUTPATIENT)
Dept: ULTRASOUND IMAGING | Facility: HOSPITAL | Age: 69
Discharge: HOME | End: 2019-09-18

## 2019-09-18 DIAGNOSIS — R92.8 ABNORMALITY OF LEFT BREAST ON SCREENING MAMMOGRAM: ICD-10-CM

## 2019-09-18 PROCEDURE — 77065 DX MAMMO INCL CAD UNI: CPT

## 2019-09-18 PROCEDURE — G0279 TOMOSYNTHESIS, MAMMO: HCPCS

## 2019-12-10 ENCOUNTER — TELEPHONE (OUTPATIENT)
Dept: FAMILY MEDICINE CLINIC | Facility: CLINIC | Age: 69
End: 2019-12-10

## 2019-12-10 ENCOUNTER — DOCUMENTATION (OUTPATIENT)
Dept: FAMILY MEDICINE CLINIC | Facility: CLINIC | Age: 69
End: 2019-12-10

## 2019-12-10 RX ORDER — FAMOTIDINE 40 MG/1
40 TABLET, FILM COATED ORAL 2 TIMES DAILY
Qty: 60 TABLET | Refills: 11 | Status: SHIPPED | OUTPATIENT
Start: 2019-12-10 | End: 2020-01-09

## 2019-12-10 RX ORDER — LISINOPRIL AND HYDROCHLOROTHIAZIDE 12.5; 1 MG/1; MG/1
1 TABLET ORAL DAILY
Qty: 30 TABLET | Refills: 11 | Status: SHIPPED | OUTPATIENT
Start: 2019-12-10 | End: 2019-12-11 | Stop reason: SDUPTHER

## 2019-12-10 NOTE — TELEPHONE ENCOUNTER
Pt dropped in for BP check. She was feeling dizzy at another doctors appt and her bp was 165/125. They gave her a bottle of water and rechecked and it was down to 154/96.      Pt brought her at home machine and it was 186/110, I did a manual pressure in the same arm and it was 162/96. Pt states she has been taking BP med everyday between 10a and 11a. She had coffee around 1:30pm and only about 12-16 oz of water for the day.

## 2019-12-10 NOTE — TELEPHONE ENCOUNTER
Patient left vm stating that she has been taking   raNITIdine (ZANTAC) 300 MG tablet morning and evening for a year. Patient states that she was told that med has been recalled. Patient is asking if she should continue taking med or switch to something else.

## 2019-12-10 NOTE — TELEPHONE ENCOUNTER
Tell Lori that her blood pressure seems high enough that we better start medication for a while.  She will take lisinopril with hydrochlorothiazide 1 a day and after she is been on it a couple of weeks I want her to start checking her blood pressure every morning and then 12 hours later and do that 3 days a week.  Write these down and then come in and see me for a visit in about a month after she has been checking the BP 3 x a week.

## 2019-12-11 RX ORDER — LISINOPRIL AND HYDROCHLOROTHIAZIDE 12.5; 1 MG/1; MG/1
1 TABLET ORAL DAILY
Qty: 90 TABLET | Refills: 2 | Status: SHIPPED | OUTPATIENT
Start: 2019-12-11 | End: 2019-12-19 | Stop reason: SDUPTHER

## 2019-12-11 NOTE — TELEPHONE ENCOUNTER
Patient was called and scheduled on 3/2 for f/u appt.     Also, patient states that scripts need to be sent to [facility].

## 2019-12-19 ENCOUNTER — TELEPHONE (OUTPATIENT)
Dept: FAMILY MEDICINE CLINIC | Facility: CLINIC | Age: 69
End: 2019-12-19

## 2019-12-19 RX ORDER — OMEPRAZOLE 40 MG/1
40 CAPSULE, DELAYED RELEASE ORAL DAILY
Qty: 90 CAPSULE | Refills: 3 | Status: SHIPPED | OUTPATIENT
Start: 2019-12-19 | End: 2019-12-26

## 2019-12-19 RX ORDER — LISINOPRIL AND HYDROCHLOROTHIAZIDE 12.5; 1 MG/1; MG/1
1 TABLET ORAL DAILY
Qty: 90 TABLET | Refills: 2 | Status: SHIPPED | OUTPATIENT
Start: 2019-12-19 | End: 2020-01-18

## 2019-12-26 RX ORDER — OMEPRAZOLE 40 MG/1
CAPSULE, DELAYED RELEASE ORAL
Qty: 90 CAPSULE | Refills: 3 | Status: SHIPPED | OUTPATIENT
Start: 2019-12-26 | End: 2020-12-05

## 2020-01-06 RX ORDER — ALLOPURINOL 300 MG/1
TABLET ORAL
Qty: 90 TABLET | Refills: 1 | Status: SHIPPED | OUTPATIENT
Start: 2020-01-06 | End: 2020-06-29

## 2020-01-06 RX ORDER — MONTELUKAST SODIUM 10 MG/1
TABLET ORAL
Qty: 90 TABLET | Refills: 1 | Status: SHIPPED | OUTPATIENT
Start: 2020-01-06 | End: 2020-06-29

## 2020-01-06 RX ORDER — RANITIDINE 300 MG/1
TABLET ORAL
Qty: 180 TABLET | Refills: 1 | Status: SHIPPED | OUTPATIENT
Start: 2020-01-06 | End: 2020-03-23 | Stop reason: ALTCHOICE

## 2020-01-06 RX ORDER — METOPROLOL SUCCINATE 100 MG/1
TABLET, EXTENDED RELEASE ORAL
Qty: 90 TABLET | Refills: 1 | Status: SHIPPED | OUTPATIENT
Start: 2020-01-06 | End: 2020-06-29

## 2020-03-23 ENCOUNTER — OFFICE VISIT (OUTPATIENT)
Dept: FAMILY MEDICINE CLINIC | Facility: CLINIC | Age: 70
End: 2020-03-23

## 2020-03-23 VITALS
TEMPERATURE: 98.3 F | SYSTOLIC BLOOD PRESSURE: 114 MMHG | HEART RATE: 68 BPM | WEIGHT: 167 LBS | OXYGEN SATURATION: 92 % | HEIGHT: 62 IN | BODY MASS INDEX: 30.73 KG/M2 | RESPIRATION RATE: 16 BRPM | DIASTOLIC BLOOD PRESSURE: 64 MMHG

## 2020-03-23 DIAGNOSIS — R05.9 COUGH: ICD-10-CM

## 2020-03-23 DIAGNOSIS — I10 ESSENTIAL HYPERTENSION: Primary | ICD-10-CM

## 2020-03-23 PROCEDURE — 99214 OFFICE O/P EST MOD 30 MIN: CPT | Performed by: FAMILY MEDICINE

## 2020-03-23 RX ORDER — LISINOPRIL AND HYDROCHLOROTHIAZIDE 12.5; 1 MG/1; MG/1
TABLET ORAL
COMMUNITY
Start: 2020-03-09 | End: 2020-07-13

## 2020-03-23 NOTE — ASSESSMENT & PLAN NOTE
Hypertension is improving with treatment.  Continue current treatment regimen.  Dietary sodium restriction.  Weight loss.  Regular aerobic exercise.  Stop smoking.  Continue current medications.  Blood pressure will be reassessed at the next regular appointment.    The reason for the patient's scheduling of this exam was for her blood pressure.  Her blood pressure readings today and at home have been excellent.  For that reason I think we will just continue medications as we are doing now and not worry about any changes.

## 2020-03-23 NOTE — PROGRESS NOTES
Rooming Tab(CC,VS,Pt Hx,Fall Screen)  Chief Complaint   Patient presents with   • Hypertension     f/u       Anne Marie Sandoval is a 70-year-old female who came in today basically for a blood pressure check.  She has been on blood pressure medicines for some time with a recent change in February right before she went down to [place].  She reports good readings at home and today her blood pressure reading is excellent.  She was in [place] since mid February and developed a cough while there that persisted until just the last few days.  She still has some cough left but it was rather annoying the whole time she was in [place] this past 6 weeks or so.  She did not have a fever with it.  She is not ill or feeling poorly during this time but she just coughed very hard and very often.    I have reviewed and updated her medications, medical history and problem list during today's office visit.     Patient Care Team:  Ayaz Luna MD as PCP - General  Ayaz Luna MD as PCP - Amanda Gonzalez MD as Consulting Physician (Plastic Surgery)    Problem List Tab  Medications Tab  Synopsis Tab  Chart Review Tab  Care Everywhere Tab  Immunizations Tab  Patient History Tab    Social History     Tobacco Use   • Smoking status: Former Smoker     Years: 40.00     Last attempt to quit: 2006     Years since quittin.2   • Smokeless tobacco: Never Used   Substance Use Topics   • Alcohol use: Yes     Comment: daily, vodka tonic       Review of Systems   Constitutional: Negative for activity change, appetite change, fatigue, fever and unexpected weight loss.   HENT: Negative for congestion, ear pain, hearing loss, postnasal drip, rhinorrhea, sinus pressure, sneezing, sore throat and swollen glands.    Eyes: Negative for blurred vision.   Respiratory: Negative for cough, shortness of breath and wheezing.    Cardiovascular: Negative for chest pain.   Gastrointestinal: Negative for  "abdominal pain, nausea and indigestion.   Genitourinary: Negative for dysuria, urgency and urinary incontinence.   Musculoskeletal: Negative for arthralgias, back pain, joint swelling and myalgias.   Skin: Negative for dry skin and skin lesions.   Allergic/Immunologic: Negative for environmental allergies.   Neurological: Negative for dizziness, headache, memory problem and confusion.   Hematological: Negative for adenopathy.   Psychiatric/Behavioral: Negative for agitation, depressed mood and stress. The patient is not nervous/anxious.    All other systems reviewed and are negative.      Objective     Rooming Tab(CC,VS,Pt Hx,Fall Screen)  /64 (BP Location: Left arm, Cuff Size: Large Adult)   Pulse 68   Temp 98.3 °F (36.8 °C) (Oral)   Resp 16   Ht 157.5 cm (62\")   Wt 75.8 kg (167 lb)   SpO2 92%   BMI 30.54 kg/m²     Body mass index is 30.54 kg/m².    Physical Exam   Constitutional: She is oriented to person, place, and time. She appears well-developed and well-nourished.   HENT:   Head: Normocephalic and atraumatic.   Right Ear: External ear normal.   Left Ear: External ear normal.   Nose: Nose normal.   Mouth/Throat: Oropharynx is clear and moist. No oropharyngeal exudate.   Eyes: Pupils are equal, round, and reactive to light. Conjunctivae and EOM are normal. Right eye exhibits no discharge. Left eye exhibits no discharge. No scleral icterus.   Neck: Normal range of motion. Neck supple. No JVD present. No thyromegaly present.   Cardiovascular: Normal rate, regular rhythm, normal heart sounds and intact distal pulses.   No murmur heard.  Pulmonary/Chest: Effort normal and breath sounds normal. No respiratory distress. She has no wheezes. She has no rales. She exhibits no tenderness.   Abdominal: Soft. Bowel sounds are normal. She exhibits no distension. There is no tenderness.   Genitourinary: Rectal exam shows guaiac negative stool.   Musculoskeletal: Normal range of motion. She exhibits no edema, " tenderness or deformity.   Lymphadenopathy:     She has no cervical adenopathy.   Neurological: She is alert and oriented to person, place, and time.   Skin: Skin is warm and dry.   Psychiatric: She has a normal mood and affect. Her behavior is normal. Judgment and thought content normal.   Vitals reviewed.       Statin Choice Calculator  Data Reviewed:                   Assessment/Plan   Order Review Tab  Health Maintenance Tab  Patient Plan/Order Tab  Diagnoses and all orders for this visit:    1. Essential hypertension (Primary)  Assessment & Plan:  Hypertension is improving with treatment.  Continue current treatment regimen.  Dietary sodium restriction.  Weight loss.  Regular aerobic exercise.  Stop smoking.  Continue current medications.  Blood pressure will be reassessed at the next regular appointment.    The reason for the patient's scheduling of this exam was for her blood pressure.  Her blood pressure readings today and at home have been excellent.  For that reason I think we will just continue medications as we are doing now and not worry about any changes.      2. Cough  Assessment & Plan:  Patient has had a very persistent cough for the past 6 weeks.  It started when she arrived in [place] and multiple trees and bushes were in [place] at the time.  She has been down there about 6-week and she is cough the entire time.  She has been home just for a few days and she Sushant feels like the cough is may be a little better.  She was never really sick this cough and she did not have fevers and she did not weak or tired.  She was started was because the cough was keeping her up.  I think we will see how she does before we put her through any big work-up.  This continues to cough up here then we will eventually need to do some x-rays and maybe a CAT scan a be evaluate for chronic cough.  The 3 reasons for chronic cough or reactive airway disease or asthma, postnasal drip syndrome, or gastroesophageal reflux  disease.  We will evaluate these 3 problems if the cough persists.        Wrapup Tab  Return in about 3 months (around 6/23/2020).

## 2020-03-23 NOTE — ASSESSMENT & PLAN NOTE
Patient has had a very persistent cough for the past 6 weeks.  It started when she arrived in [place] and multiple trees and bushes were in [place] at the time.  She has been down there about 6-week and she is cough the entire time.  She has been home just for a few days and she Sushant feels like the cough is may be a little better.  She was never really sick this cough and she did not have fevers and she did not weak or tired.  She was started was because the cough was keeping her up.  I think we will see how she does before we put her through any big work-up.  This continues to cough up here then we will eventually need to do some x-rays and maybe a CAT scan a be evaluate for chronic cough.  The 3 reasons for chronic cough or reactive airway disease or asthma, postnasal drip syndrome, or gastroesophageal reflux disease.  We will evaluate these 3 problems if the cough persists.

## 2020-04-10 RX ORDER — CITALOPRAM 20 MG/1
TABLET ORAL
Qty: 90 TABLET | Refills: 2 | Status: SHIPPED | OUTPATIENT
Start: 2020-04-10 | End: 2020-12-05

## 2020-04-10 RX ORDER — ATORVASTATIN CALCIUM 20 MG/1
TABLET, FILM COATED ORAL
Qty: 90 TABLET | Refills: 2 | Status: SHIPPED | OUTPATIENT
Start: 2020-04-10 | End: 2020-12-05

## 2020-05-21 ENCOUNTER — TELEPHONE (OUTPATIENT)
Dept: FAMILY MEDICINE CLINIC | Facility: CLINIC | Age: 70
End: 2020-05-21

## 2020-05-21 DIAGNOSIS — R05.9 COUGH: ICD-10-CM

## 2020-05-21 DIAGNOSIS — J40 BRONCHITIS: Primary | ICD-10-CM

## 2020-05-21 DIAGNOSIS — J68.3 REACTIVE AIRWAYS DYSFUNCTION SYNDROME (HCC): ICD-10-CM

## 2020-05-21 RX ORDER — DOXYCYCLINE HYCLATE 100 MG
100 TABLET ORAL
Qty: 20 TABLET | Refills: 0 | Status: SHIPPED | OUTPATIENT
Start: 2020-05-21 | End: 2020-05-31

## 2020-05-21 NOTE — TELEPHONE ENCOUNTER
Pt has had persistent cough since February, productive of phlegm. Sometimes coughs so hard, she vomits. No fever. Please send in some med for her if possible. Thank you.

## 2020-05-21 NOTE — TELEPHONE ENCOUNTER
Patient has a cough and is wanting to know if she needs to schedule a visit to be seen or if something can be called in to help with this.

## 2020-05-22 ENCOUNTER — TELEPHONE (OUTPATIENT)
Dept: FAMILY MEDICINE CLINIC | Facility: CLINIC | Age: 70
End: 2020-05-22

## 2020-05-22 DIAGNOSIS — J40 BRONCHITIS: ICD-10-CM

## 2020-05-22 DIAGNOSIS — J68.3 REACTIVE AIRWAYS DYSFUNCTION SYNDROME (HCC): ICD-10-CM

## 2020-05-22 DIAGNOSIS — R05.9 COUGH: ICD-10-CM

## 2020-05-22 NOTE — TELEPHONE ENCOUNTER
RENATA FROM [facility] IS REQUESTING THAT THE DOCTOR RESEND RX  FOR HYDROcodone-homatropine (HYCODAN) 5-1.5 MG/5ML syrup TO [id number] - [place], [place] - [address] AT [facility] OF [address] [place] - [phone] [facility] [phone]   [phone]  RX WAS SENT TO [facility] IN [place] AND THEY CAN TRANSFER.    PLEASE ADVISE

## 2020-06-29 RX ORDER — METOPROLOL SUCCINATE 100 MG/1
TABLET, EXTENDED RELEASE ORAL
Qty: 90 TABLET | Refills: 1 | Status: SHIPPED | OUTPATIENT
Start: 2020-06-29 | End: 2020-11-10

## 2020-06-29 RX ORDER — ALLOPURINOL 300 MG/1
TABLET ORAL
Qty: 90 TABLET | Refills: 1 | Status: SHIPPED | OUTPATIENT
Start: 2020-06-29 | End: 2020-11-10

## 2020-06-29 RX ORDER — MONTELUKAST SODIUM 10 MG/1
TABLET ORAL
Qty: 90 TABLET | Refills: 1 | Status: SHIPPED | OUTPATIENT
Start: 2020-06-29 | End: 2020-11-10

## 2020-07-13 RX ORDER — LISINOPRIL AND HYDROCHLOROTHIAZIDE 12.5; 1 MG/1; MG/1
TABLET ORAL
Qty: 90 TABLET | Refills: 1 | Status: SHIPPED | OUTPATIENT
Start: 2020-07-13 | End: 2020-07-22

## 2020-07-22 ENCOUNTER — TELEPHONE (OUTPATIENT)
Dept: FAMILY MEDICINE CLINIC | Facility: CLINIC | Age: 70
End: 2020-07-22

## 2020-07-22 RX ORDER — VALSARTAN AND HYDROCHLOROTHIAZIDE 160; 25 MG/1; MG/1
1 TABLET ORAL DAILY
Qty: 30 TABLET | Refills: 11 | Status: SHIPPED | OUTPATIENT
Start: 2020-07-22 | End: 2020-07-23

## 2020-07-22 RX ORDER — VALSARTAN AND HYDROCHLOROTHIAZIDE 160; 25 MG/1; MG/1
1 TABLET ORAL DAILY
Qty: 30 TABLET | Refills: 11 | Status: SHIPPED | OUTPATIENT
Start: 2020-07-22 | End: 2020-07-22 | Stop reason: SDUPTHER

## 2020-07-22 NOTE — TELEPHONE ENCOUNTER
PATIENT STATE: that  lisinopril-hydrochlorothiazide (PRINZIDE,ZESTORETIC) 10-12.5 MG per tablet is making her cough to the point where she throws up at times. She would like to know if its the meds doing this if so she would like to see if there is anything else that she can take. Please advise     PATIENT CAN BE REACHED ON:[phone]    PHARMACY PREFERRED:[id number] - [place], [place] - [address] AT [address] - [phone] [facility] [phone] FX

## 2020-07-22 NOTE — TELEPHONE ENCOUNTER
Tell Lori that the only way we will know about the coughing is just simply stop the lisinopril and start the Diovan and let see how she does.  It may take 4 to 6 weeks for the cough to stop even after the lisinopril is out of her system.  Be patient and let see how she is doing in about 6 weeks

## 2020-07-23 RX ORDER — VALSARTAN AND HYDROCHLOROTHIAZIDE 160; 25 MG/1; MG/1
TABLET ORAL
Qty: 90 TABLET | Refills: 1 | Status: SHIPPED | OUTPATIENT
Start: 2020-07-23 | End: 2020-10-05

## 2020-08-24 ENCOUNTER — OFFICE VISIT (OUTPATIENT)
Dept: FAMILY MEDICINE CLINIC | Facility: CLINIC | Age: 70
End: 2020-08-24

## 2020-08-24 VITALS
BODY MASS INDEX: 31.47 KG/M2 | WEIGHT: 171 LBS | RESPIRATION RATE: 16 BRPM | DIASTOLIC BLOOD PRESSURE: 82 MMHG | HEIGHT: 62 IN | SYSTOLIC BLOOD PRESSURE: 120 MMHG | HEART RATE: 89 BPM | TEMPERATURE: 97.3 F | OXYGEN SATURATION: 93 %

## 2020-08-24 DIAGNOSIS — R79.9 ABNORMAL FINDING OF BLOOD CHEMISTRY, UNSPECIFIED: ICD-10-CM

## 2020-08-24 DIAGNOSIS — K63.5 HYPERPLASTIC COLONIC POLYP, UNSPECIFIED PART OF COLON: ICD-10-CM

## 2020-08-24 DIAGNOSIS — E78.2 MIXED HYPERLIPIDEMIA: ICD-10-CM

## 2020-08-24 DIAGNOSIS — N60.19 FIBROCYSTIC BREAST DISEASE (FCBD), UNSPECIFIED LATERALITY: ICD-10-CM

## 2020-08-24 DIAGNOSIS — E55.9 VITAMIN D DEFICIENCY, UNSPECIFIED: ICD-10-CM

## 2020-08-24 DIAGNOSIS — J68.3 REACTIVE AIRWAYS DYSFUNCTION SYNDROME (HCC): ICD-10-CM

## 2020-08-24 DIAGNOSIS — R05.9 COUGH: ICD-10-CM

## 2020-08-24 DIAGNOSIS — I10 ESSENTIAL HYPERTENSION: ICD-10-CM

## 2020-08-24 DIAGNOSIS — M15.9 PRIMARY OSTEOARTHRITIS INVOLVING MULTIPLE JOINTS: ICD-10-CM

## 2020-08-24 DIAGNOSIS — Z00.00 MEDICARE ANNUAL WELLNESS VISIT, SUBSEQUENT: Primary | ICD-10-CM

## 2020-08-24 DIAGNOSIS — F33.42 RECURRENT MAJOR DEPRESSIVE DISORDER, IN FULL REMISSION (HCC): ICD-10-CM

## 2020-08-24 DIAGNOSIS — I87.2 PERIPHERAL VENOUS INSUFFICIENCY: ICD-10-CM

## 2020-08-24 DIAGNOSIS — M1A.0720 CHRONIC IDIOPATHIC GOUT INVOLVING TOE OF LEFT FOOT WITHOUT TOPHUS: ICD-10-CM

## 2020-08-24 PROCEDURE — G0439 PPPS, SUBSEQ VISIT: HCPCS | Performed by: FAMILY MEDICINE

## 2020-08-24 PROCEDURE — 99214 OFFICE O/P EST MOD 30 MIN: CPT | Performed by: FAMILY MEDICINE

## 2020-08-24 RX ORDER — ALBUTEROL SULFATE 90 UG/1
AEROSOL, METERED RESPIRATORY (INHALATION)
Qty: 25.5 G | Refills: 1 | Status: SHIPPED | OUTPATIENT
Start: 2020-08-24 | End: 2020-08-24

## 2020-08-24 RX ORDER — ALBUTEROL SULFATE 90 UG/1
2 AEROSOL, METERED RESPIRATORY (INHALATION) EVERY 4 HOURS PRN
Qty: 8 G | Refills: 6 | Status: SHIPPED | OUTPATIENT
Start: 2020-08-24 | End: 2020-08-24

## 2020-08-24 RX ORDER — ALBUTEROL SULFATE 90 UG/1
AEROSOL, METERED RESPIRATORY (INHALATION)
Qty: 42.5 G | Refills: 2 | Status: SHIPPED | OUTPATIENT
Start: 2020-08-24

## 2020-08-25 ENCOUNTER — LAB (OUTPATIENT)
Dept: FAMILY MEDICINE CLINIC | Facility: CLINIC | Age: 70
End: 2020-08-25

## 2020-08-25 LAB
25(OH)D3 SERPL-MCNC: 72.3 NG/ML (ref 30–100)
ALBUMIN SERPL-MCNC: 4.3 G/DL (ref 3.5–5.2)
ALBUMIN/GLOB SERPL: 1.8 G/DL
ALP SERPL-CCNC: 63 U/L (ref 39–117)
ALT SERPL W P-5'-P-CCNC: 18 U/L (ref 1–33)
ANION GAP SERPL CALCULATED.3IONS-SCNC: 11.3 MMOL/L (ref 5–15)
AST SERPL-CCNC: 21 U/L (ref 1–32)
BASOPHILS # BLD AUTO: 0 10*3/MM3 (ref 0–0.2)
BASOPHILS NFR BLD AUTO: 1 % (ref 0–1.5)
BILIRUB SERPL-MCNC: 0.5 MG/DL (ref 0–1.2)
BILIRUB UR QL STRIP: NEGATIVE
BUN SERPL-MCNC: 13 MG/DL (ref 8–23)
BUN/CREAT SERPL: 21 (ref 7–25)
CALCIUM SPEC-SCNC: 9.4 MG/DL (ref 8.6–10.5)
CHLORIDE SERPL-SCNC: 99 MMOL/L (ref 98–107)
CHOLEST SERPL-MCNC: 184 MG/DL (ref 0–200)
CLARITY UR: CLEAR
CO2 SERPL-SCNC: 30.7 MMOL/L (ref 22–29)
COLOR UR: YELLOW
CREAT SERPL-MCNC: 0.62 MG/DL (ref 0.57–1)
DEPRECATED RDW RBC AUTO: 46.8 FL (ref 37–54)
EOSINOPHIL # BLD AUTO: 0.2 10*3/MM3 (ref 0–0.4)
EOSINOPHIL NFR BLD AUTO: 3.8 % (ref 0.3–6.2)
ERYTHROCYTE [DISTWIDTH] IN BLOOD BY AUTOMATED COUNT: 13.2 % (ref 12.3–15.4)
GFR SERPL CREATININE-BSD FRML MDRD: 95 ML/MIN/1.73
GLOBULIN UR ELPH-MCNC: 2.4 GM/DL
GLUCOSE SERPL-MCNC: 88 MG/DL (ref 65–99)
GLUCOSE UR STRIP-MCNC: NEGATIVE MG/DL
HBA1C MFR BLD: 5.9 % (ref 3.5–5.6)
HCT VFR BLD AUTO: 37.2 % (ref 34–46.6)
HDLC SERPL-MCNC: 67 MG/DL (ref 40–60)
HGB BLD-MCNC: 12.6 G/DL (ref 12–15.9)
HGB UR QL STRIP.AUTO: NEGATIVE
KETONES UR QL STRIP: NEGATIVE
LDLC SERPL CALC-MCNC: 72 MG/DL (ref 0–100)
LDLC/HDLC SERPL: 1.07 {RATIO}
LEUKOCYTE ESTERASE UR QL STRIP.AUTO: NEGATIVE
LYMPHOCYTES # BLD AUTO: 1.1 10*3/MM3 (ref 0.7–3.1)
LYMPHOCYTES NFR BLD AUTO: 24.8 % (ref 19.6–45.3)
MCH RBC QN AUTO: 34.6 PG (ref 26.6–33)
MCHC RBC AUTO-ENTMCNC: 33.8 G/DL (ref 31.5–35.7)
MCV RBC AUTO: 102.4 FL (ref 79–97)
MONOCYTES # BLD AUTO: 0.6 10*3/MM3 (ref 0.1–0.9)
MONOCYTES NFR BLD AUTO: 14.6 % (ref 5–12)
NEUTROPHILS NFR BLD AUTO: 2.4 10*3/MM3 (ref 1.7–7)
NEUTROPHILS NFR BLD AUTO: 55.8 % (ref 42.7–76)
NITRITE UR QL STRIP: NEGATIVE
NRBC BLD AUTO-RTO: 0.1 /100 WBC (ref 0–0.2)
PH UR STRIP.AUTO: 7 [PH] (ref 5–8)
PLATELET # BLD AUTO: 221 10*3/MM3 (ref 140–450)
PMV BLD AUTO: 7.1 FL (ref 6–12)
POTASSIUM SERPL-SCNC: 4 MMOL/L (ref 3.5–5.2)
PROT SERPL-MCNC: 6.7 G/DL (ref 6–8.5)
PROT UR QL STRIP: NEGATIVE
RBC # BLD AUTO: 3.63 10*6/MM3 (ref 3.77–5.28)
SODIUM SERPL-SCNC: 141 MMOL/L (ref 136–145)
SP GR UR STRIP: 1.01 (ref 1–1.03)
T4 FREE SERPL-MCNC: 1.17 NG/DL (ref 0.93–1.7)
TRIGL SERPL-MCNC: 225 MG/DL (ref 0–150)
TSH SERPL DL<=0.05 MIU/L-ACNC: 3.74 UIU/ML (ref 0.27–4.2)
UROBILINOGEN UR QL STRIP: NORMAL
VIT B12 BLD-MCNC: 513 PG/ML (ref 211–946)
VLDLC SERPL-MCNC: 45 MG/DL (ref 5–40)
WBC # BLD AUTO: 4.3 10*3/MM3 (ref 3.4–10.8)

## 2020-08-25 PROCEDURE — 85025 COMPLETE CBC W/AUTO DIFF WBC: CPT | Performed by: FAMILY MEDICINE

## 2020-08-25 PROCEDURE — 84439 ASSAY OF FREE THYROXINE: CPT | Performed by: FAMILY MEDICINE

## 2020-08-25 PROCEDURE — 84443 ASSAY THYROID STIM HORMONE: CPT | Performed by: FAMILY MEDICINE

## 2020-08-25 PROCEDURE — 82607 VITAMIN B-12: CPT | Performed by: FAMILY MEDICINE

## 2020-08-25 PROCEDURE — 83036 HEMOGLOBIN GLYCOSYLATED A1C: CPT | Performed by: FAMILY MEDICINE

## 2020-08-25 PROCEDURE — 80061 LIPID PANEL: CPT | Performed by: FAMILY MEDICINE

## 2020-08-25 PROCEDURE — 82306 VITAMIN D 25 HYDROXY: CPT | Performed by: FAMILY MEDICINE

## 2020-08-25 PROCEDURE — 80053 COMPREHEN METABOLIC PANEL: CPT | Performed by: FAMILY MEDICINE

## 2020-08-25 PROCEDURE — 81003 URINALYSIS AUTO W/O SCOPE: CPT | Performed by: FAMILY MEDICINE

## 2020-08-31 ENCOUNTER — TRANSCRIBE ORDERS (OUTPATIENT)
Dept: ADMINISTRATIVE | Facility: HOSPITAL | Age: 70
End: 2020-08-31

## 2020-08-31 DIAGNOSIS — Z12.31 VISIT FOR SCREENING MAMMOGRAM: Primary | ICD-10-CM

## 2020-09-21 ENCOUNTER — HOSPITAL ENCOUNTER (OUTPATIENT)
Dept: MAMMOGRAPHY | Facility: HOSPITAL | Age: 70
Discharge: HOME | End: 2020-09-21
Admitting: FAMILY MEDICINE

## 2020-09-21 DIAGNOSIS — Z12.31 VISIT FOR SCREENING MAMMOGRAM: ICD-10-CM

## 2020-09-21 PROCEDURE — 77067 SCR MAMMO BI INCL CAD: CPT

## 2020-09-21 PROCEDURE — 77063 BREAST TOMOSYNTHESIS BI: CPT

## 2020-10-02 ENCOUNTER — TELEPHONE (OUTPATIENT)
Dept: FAMILY MEDICINE CLINIC | Facility: CLINIC | Age: 70
End: 2020-10-02

## 2020-10-02 NOTE — TELEPHONE ENCOUNTER
Patient took her bp at the pharmacy in the automatic machine and it was 189 over something.  Then she asked the pharmacist to take it and it was 170/106.  What do you want her to do - take more medication or go to the ER?  Wants someone to call her today.

## 2020-10-04 NOTE — TELEPHONE ENCOUNTER
I left a message for Lori.  She is to increase her Diovan/HCTZ to 1 twice a day.  If on any day her blood pressure gets extremely high she could also take an extra metoprolol succinate 100.  She can take 1 in the morning and 1 at night.  Right now though as long as her top number or systolic number is below 180 I just want her to take the Diovan twice a day.  I want to hear from her next week as to how she is doing

## 2020-10-05 ENCOUNTER — TELEPHONE (OUTPATIENT)
Dept: FAMILY MEDICINE CLINIC | Facility: CLINIC | Age: 70
End: 2020-10-05

## 2020-10-05 RX ORDER — VALSARTAN AND HYDROCHLOROTHIAZIDE 320; 25 MG/1; MG/1
1 TABLET, FILM COATED ORAL DAILY
Qty: 90 TABLET | Refills: 3 | Status: SHIPPED | OUTPATIENT
Start: 2020-10-05 | End: 2020-12-09 | Stop reason: SDUPTHER

## 2020-10-05 RX ORDER — VALSARTAN AND HYDROCHLOROTHIAZIDE 160; 25 MG/1; MG/1
1 TABLET ORAL DAILY
Qty: 90 TABLET | Refills: 1 | Status: CANCELLED | OUTPATIENT
Start: 2020-10-05

## 2020-10-05 RX ORDER — VALSARTAN AND HYDROCHLOROTHIAZIDE 160; 25 MG/1; MG/1
1 TABLET ORAL 2 TIMES DAILY
Qty: 180 TABLET | Refills: 1 | Status: CANCELLED | OUTPATIENT
Start: 2020-10-05

## 2020-10-05 NOTE — TELEPHONE ENCOUNTER
Caller: Lori Sutton    Relationship: Self    Best call back number: [phone]     What medication are you requesting: valsartan-hydrochlorothiazide (DIOVAN-HCT) 160-25 MG per tablet    Per 10/02/2020 Encounter with Dr. Luna, frequency needs to be changed to 1 tablet twice a day    Have you had these symptoms before:    [x] Yes  [] No    Have you been treated for these symptoms before:   [x] Yes  [] No    If a prescription is needed, what is your preferred pharmacy and phone number: [id number] [place], [place] - [address] AT [address] - [phone] [facility] [phone]      Additional notes: Patient stated that she only had two days left of her blood pressure medication. Patient also stated that she is a little confused on the directions she received from Dr. Luna and would like to receive some further clarification.    Please call and advise.

## 2020-10-05 NOTE — TELEPHONE ENCOUNTER
Returned pt call and informed of instructions and she also needs a refill of her blood pressure medications

## 2020-10-05 NOTE — TELEPHONE ENCOUNTER
Caller: Lori Sutton    Relationship to patient: Self    Best call back number: [phone]    Patient is needing: Patient returning call to M.A.

## 2020-11-02 ENCOUNTER — TELEPHONE (OUTPATIENT)
Dept: FAMILY MEDICINE CLINIC | Facility: CLINIC | Age: 70
End: 2020-11-02

## 2020-11-02 RX ORDER — INFLUENZA A VIRUS A/MICHIGAN/45/2015 X-275 (H1N1) ANTIGEN (FORMALDEHYDE INACTIVATED), INFLUENZA A VIRUS A/SINGAPORE/INFIMH-16-0019/2016 IVR-186 (H3N2) ANTIGEN (FORMALDEHYDE INACTIVATED), INFLUENZA B VIRUS B/PHUKET/3073/2013 ANTIGEN (FORMALDEHYDE INACTIVATED), AND INFLUENZA B VIRUS B/MARYLAND/15/2016 BX-69A ANTIGEN (FORMALDEHYDE INACTIVATED) 60; 60; 60; 60 UG/.7ML; UG/.7ML; UG/.7ML; UG/.7ML
INJECTION, SUSPENSION INTRAMUSCULAR
COMMUNITY
Start: 2020-10-02 | End: 2023-01-11

## 2020-11-02 RX ORDER — HYDRALAZINE HYDROCHLORIDE 50 MG/1
50 TABLET, FILM COATED ORAL 3 TIMES DAILY
Qty: 90 TABLET | Refills: 11 | Status: SHIPPED | OUTPATIENT
Start: 2020-11-02 | End: 2020-12-09 | Stop reason: SDUPTHER

## 2020-11-02 NOTE — TELEPHONE ENCOUNTER
PATIENTS  BLOOD PRESSURE READINGS AFTER CHANGING HER MEDICATION:    OCT. 3RD  169/99    179/105    153/91    147/89    155/94    OCT. 11   167/95    OCT 26  152/89    157/89    178/102 AND  159/99      PLEASE ADVISE  [phone]

## 2020-11-10 RX ORDER — METOPROLOL SUCCINATE 100 MG/1
TABLET, EXTENDED RELEASE ORAL
Qty: 90 TABLET | Refills: 3 | Status: SHIPPED | OUTPATIENT
Start: 2020-11-10 | End: 2021-09-27

## 2020-11-10 RX ORDER — ALLOPURINOL 300 MG/1
TABLET ORAL
Qty: 90 TABLET | Refills: 3 | Status: SHIPPED | OUTPATIENT
Start: 2020-11-10 | End: 2021-09-27

## 2020-11-10 RX ORDER — MONTELUKAST SODIUM 10 MG/1
TABLET ORAL
Qty: 90 TABLET | Refills: 3 | Status: SHIPPED | OUTPATIENT
Start: 2020-11-10 | End: 2021-06-14

## 2020-11-16 RX ORDER — BUDESONIDE AND FORMOTEROL FUMARATE DIHYDRATE 160; 4.5 UG/1; UG/1
2 AEROSOL RESPIRATORY (INHALATION)
Qty: 3 INHALER | Refills: 3 | Status: CANCELLED | OUTPATIENT
Start: 2020-11-16

## 2020-11-16 NOTE — TELEPHONE ENCOUNTER
Caller: Lori Sutton    Relationship: Self    Best call back number: [phone]    Medication needed:   Requested Prescriptions     Pending Prescriptions Disp Refills   • budesonide-formoterol (Symbicort) 160-4.5 MCG/ACT inhaler          When do you need the refill by: W/IN FIVE DAYS    What details did the patient provide when requesting the medication: PHARMACY WILL BE REQUESTING ALSO, TOLD PT TO CALL    Does the patient have less than a 3 day supply:  [] Yes  [x] No    What is the patient's preferred pharmacy: [email or web address] - [address] [phone] [facility] [phone]

## 2020-11-18 RX ORDER — BUDESONIDE AND FORMOTEROL FUMARATE DIHYDRATE 160; 4.5 UG/1; UG/1
2 AEROSOL RESPIRATORY (INHALATION)
Qty: 3 INHALER | Refills: 3 | Status: CANCELLED | OUTPATIENT
Start: 2020-11-18

## 2020-11-19 RX ORDER — BUDESONIDE AND FORMOTEROL FUMARATE DIHYDRATE 80; 4.5 UG/1; UG/1
2 AEROSOL RESPIRATORY (INHALATION)
Qty: 10.2 G | Refills: 11 | Status: SHIPPED | OUTPATIENT
Start: 2020-11-19 | End: 2020-11-20

## 2020-11-20 RX ORDER — BUDESONIDE AND FORMOTEROL FUMARATE DIHYDRATE 80; 4.5 UG/1; UG/1
2 AEROSOL RESPIRATORY (INHALATION)
Qty: 10.2 G | Refills: 10 | Status: SHIPPED | OUTPATIENT
Start: 2020-11-20 | End: 2022-03-23 | Stop reason: SDUPTHER

## 2020-12-05 RX ORDER — ATORVASTATIN CALCIUM 20 MG/1
TABLET, FILM COATED ORAL
Qty: 90 TABLET | Refills: 3 | Status: SHIPPED | OUTPATIENT
Start: 2020-12-05 | End: 2021-10-17

## 2020-12-05 RX ORDER — CITALOPRAM 20 MG/1
TABLET ORAL
Qty: 90 TABLET | Refills: 3 | Status: SHIPPED | OUTPATIENT
Start: 2020-12-05 | End: 2021-10-17

## 2020-12-05 RX ORDER — OMEPRAZOLE 40 MG/1
CAPSULE, DELAYED RELEASE ORAL
Qty: 90 CAPSULE | Refills: 3 | Status: SHIPPED | OUTPATIENT
Start: 2020-12-05 | End: 2022-01-10

## 2020-12-09 RX ORDER — HYDRALAZINE HYDROCHLORIDE 50 MG/1
50 TABLET, FILM COATED ORAL 3 TIMES DAILY
Qty: 90 TABLET | Refills: 11 | Status: SHIPPED | OUTPATIENT
Start: 2020-12-09 | End: 2021-08-27

## 2020-12-09 RX ORDER — VALSARTAN AND HYDROCHLOROTHIAZIDE 320; 25 MG/1; MG/1
1 TABLET, FILM COATED ORAL DAILY
Qty: 90 TABLET | Refills: 3 | Status: SHIPPED | OUTPATIENT
Start: 2020-12-09 | End: 2021-10-28

## 2020-12-09 NOTE — TELEPHONE ENCOUNTER
Caller: Lori Sutton    Relationship: Self    Best call back number: [phone]    Medication needed:   Requested Prescriptions     Pending Prescriptions Disp Refills   • valsartan-hydrochlorothiazide (DIOVAN-HCT) 320-25 MG per tablet 90 tablet 3     Sig: Take 1 tablet by mouth Daily for 90 days.   • hydrALAZINE (APRESOLINE) 50 MG tablet 90 tablet 11     Sig: Take 1 tablet by mouth 3 (Three) Times a Day for 30 days.       When do you need the refill by: ASAP (MAIL ORDER PHARMACY)     What details did the patient provide when requesting the medication: PATIENT HAS SEVERAL DAYS LEFT.     Does the patient have less than a 3 day supply:  [] Yes  [x] No    What is the patient's preferred pharmacy: [facility] - [address] [phone] [facility] [phone]

## 2021-02-03 RX ORDER — TRIAMCINOLONE ACETONIDE 5 MG/G
OINTMENT TOPICAL 2 TIMES DAILY
Qty: 60 G | Refills: 1 | Status: SHIPPED | OUTPATIENT
Start: 2021-02-03 | End: 2022-10-20

## 2021-02-03 NOTE — TELEPHONE ENCOUNTER
Caller: Lori Sutton    Relationship: Self    Best call back number: [phone]    What medication are you requesting: TRIAMCINOLONE ACETINIDE    What are your current symptoms: SKIN IRRITATION    How long have you been experiencing symptoms: LONG-TERM    Have you had these symptoms before:    [x] Yes  [] No    Have you been treated for these symptoms before:   [x] Yes  [] No    If a prescription is needed, what is your preferred pharmacy and phone number: [facility] - [address] [phone] [facility] [phone] [facility]     Additional notes: PATIENT SAID DR. QUEEN HAD GIVEN HER THIS FOR HER LEGS, SHE SAID IT IS WORKING AND SHE IS ALSO USING IT ON HER ARMS, SHE IS WANTING A PRESCRIPTION OF IT SENT INTO [facility] SO SHE CAN GET A 90 DAY SUPPLY

## 2021-06-14 ENCOUNTER — TELEPHONE (OUTPATIENT)
Dept: FAMILY MEDICINE CLINIC | Facility: CLINIC | Age: 71
End: 2021-06-14

## 2021-06-14 RX ORDER — PREDNISONE 20 MG/1
TABLET ORAL
Qty: 25 TABLET | Refills: 1 | Status: SHIPPED | OUTPATIENT
Start: 2021-06-14 | End: 2021-08-27

## 2021-06-14 RX ORDER — MONTELUKAST SODIUM 10 MG/1
10 TABLET ORAL NIGHTLY
Qty: 90 TABLET | Refills: 2 | Status: SHIPPED | OUTPATIENT
Start: 2021-06-14 | End: 2021-09-27

## 2021-06-14 NOTE — TELEPHONE ENCOUNTER
Caller: Sage Sutton    Relationship: Self    Best call back number: [phone]    What is the best time to reach you: ANYTIME    Who are you requesting to speak with (clinical staff, provider,  specific staff member): CLINICAL STAFF    Do you know the name of the person who called: SAGE SUTTON    What was the call regarding: PATIENT SAID SHE HAS BEEN NEEDING TO USE HER SYMBICORT  INHALER ABOUT TWICE A DAY AND HER ALBUTEROL INHALER TWICE A DAY AS WELL    SAID SHE HAS BEEN RATTLING AT NIGHT CAUSING HER TO USE THE EMERGENCY INHALER     SHE FEELS THIS IS NOT NORMAL FOR HER AND     WOULD LIKE FURTHER DIRECTION ON WHAT SHE SHOULD DO, HUB COULD NOT FIND AVAILABLE APPOINTMENT IN THE OFFICE THIS WEEK     Do you require a callback: YES

## 2021-06-15 ENCOUNTER — TELEPHONE (OUTPATIENT)
Dept: FAMILY MEDICINE CLINIC | Facility: CLINIC | Age: 71
End: 2021-06-15

## 2021-06-15 NOTE — TELEPHONE ENCOUNTER
Arlet tell Lori to use her Symbicort inhaler 2 puffs twice a day routinely and then her albuterol inhaler 2 to 4 puffs every 4 hours as needed in between her use of the Symbicort.  I am going to call in an oral prednisone for her a tapering dose over 2 weeks and Singulair which she takes 1 every day.  If she is running high fevers or coughing up mucopurulent looking sputum or blood she is going to need to call us back and get an antibiotic.  If she gets too short of breath or if her oxygen saturations drop below 90 she needs to go to the emergency room.  If she is not any better by the end of the week I need to see her.

## 2021-06-15 NOTE — TELEPHONE ENCOUNTER
Caller: Lori Sutton    Relationship to patient: Self    Best call back number: [phone]    Patient is needing: PATIENT HAD A PRESCRIPTION FOR PREDNISONE SENT INTO [facility] YESTERDAY IN [place], [place]    [facility] IN [place] WHO IS GOING TO FILL IT    SHE WANTED TO LET THE OFFICE KNOW THAT SHE WANTS HER PRIMARY PHARMACY TO BE THE [facility] IN [place], [place]    PHARMACY: [id number] - [place], [place] - [address] - [phone]  - [phone]   [phone]    PATIENT WAS ONLY IN [place] TEMPORARILY WHEN SHE HAD A PRESCRIPTION FILLED THERE

## 2021-08-17 ENCOUNTER — TELEPHONE (OUTPATIENT)
Dept: FAMILY MEDICINE CLINIC | Facility: CLINIC | Age: 71
End: 2021-08-17

## 2021-08-17 DIAGNOSIS — E78.2 MIXED HYPERLIPIDEMIA: Primary | ICD-10-CM

## 2021-08-17 DIAGNOSIS — E55.9 VITAMIN D DEFICIENCY, UNSPECIFIED: ICD-10-CM

## 2021-08-17 DIAGNOSIS — Z11.59 ENCOUNTER FOR HEPATITIS C SCREENING TEST FOR LOW RISK PATIENT: ICD-10-CM

## 2021-08-17 DIAGNOSIS — I10 ESSENTIAL HYPERTENSION: ICD-10-CM

## 2021-08-17 NOTE — TELEPHONE ENCOUNTER
Caller: Lori Sutton    Relationship: Self    Best call back number: [phone]    What orders are you requesting (i.e. lab or imaging): BLOOD WORK BEFORE MEDICARE WELLNESS VISIT    In what timeframe would the patient need to come in: BEFORE 08/27/21    Where will you receive your lab/imaging services: OFFICE    Additional notes: PATIENT IS WANTING TO HAVE LABS DRAWN BEFORE HER NEXT MEDICARE WELLNESS VISIT

## 2021-08-17 NOTE — TELEPHONE ENCOUNTER
Spoke with patient and scheduled [place] [facility] [place] 8/20/2021 at 11am.

## 2021-08-17 NOTE — TELEPHONE ENCOUNTER
Caller: Lori Sutton    Relationship: Self    Best call back number: [phone]    What orders are you requesting (i.e. lab or imaging): COLOGUARD    In what timeframe would the patient need to come in: N/A    Where will you receive your lab/imaging services: N/A    Additional notes: PATIENT WOULD LIKE DR. QUEEN TO ORDER A COLOGUARD TEST FOR HER    SHE SAID THAT SHE CHECKED AND IS NOT DUE FOR ANOTHER COLONOSCOPY THIS YEAR, BUT SINCE SHE HAS HAD POLYPS IN THE PAST, SHE WOULD LIKE TO DO A COLOGUARD INSTEAD

## 2021-08-20 ENCOUNTER — LAB (OUTPATIENT)
Dept: FAMILY MEDICINE CLINIC | Facility: CLINIC | Age: 71
End: 2021-08-20

## 2021-08-20 DIAGNOSIS — Z11.59 ENCOUNTER FOR HEPATITIS C SCREENING TEST FOR LOW RISK PATIENT: ICD-10-CM

## 2021-08-20 DIAGNOSIS — E55.9 VITAMIN D DEFICIENCY, UNSPECIFIED: ICD-10-CM

## 2021-08-20 DIAGNOSIS — I10 ESSENTIAL HYPERTENSION: ICD-10-CM

## 2021-08-20 DIAGNOSIS — E78.2 MIXED HYPERLIPIDEMIA: ICD-10-CM

## 2021-08-20 LAB
ALBUMIN SERPL-MCNC: 4.6 G/DL (ref 3.5–5.2)
ALBUMIN/GLOB SERPL: 1.8 G/DL
ALP SERPL-CCNC: 83 U/L (ref 39–117)
ALT SERPL W P-5'-P-CCNC: 17 U/L (ref 1–33)
ANION GAP SERPL CALCULATED.3IONS-SCNC: 12.7 MMOL/L (ref 5–15)
AST SERPL-CCNC: 22 U/L (ref 1–32)
BASOPHILS # BLD AUTO: 0.08 10*3/MM3 (ref 0–0.2)
BASOPHILS NFR BLD AUTO: 1.4 % (ref 0–1.5)
BILIRUB SERPL-MCNC: 0.7 MG/DL (ref 0–1.2)
BILIRUB UR QL STRIP: NEGATIVE
BUN SERPL-MCNC: 15 MG/DL (ref 8–23)
BUN/CREAT SERPL: 22.1 (ref 7–25)
CALCIUM SPEC-SCNC: 9.5 MG/DL (ref 8.6–10.5)
CHLORIDE SERPL-SCNC: 94 MMOL/L (ref 98–107)
CHOLEST SERPL-MCNC: 196 MG/DL (ref 0–200)
CLARITY UR: CLEAR
CO2 SERPL-SCNC: 31.3 MMOL/L (ref 22–29)
COLOR UR: YELLOW
CREAT SERPL-MCNC: 0.68 MG/DL (ref 0.57–1)
DEPRECATED RDW RBC AUTO: 46.9 FL (ref 37–54)
EOSINOPHIL # BLD AUTO: 0.14 10*3/MM3 (ref 0–0.4)
EOSINOPHIL NFR BLD AUTO: 2.4 % (ref 0.3–6.2)
ERYTHROCYTE [DISTWIDTH] IN BLOOD BY AUTOMATED COUNT: 12.9 % (ref 12.3–15.4)
GFR SERPL CREATININE-BSD FRML MDRD: 85 ML/MIN/1.73
GLOBULIN UR ELPH-MCNC: 2.5 GM/DL
GLUCOSE SERPL-MCNC: 87 MG/DL (ref 65–99)
GLUCOSE UR STRIP-MCNC: NEGATIVE MG/DL
HCT VFR BLD AUTO: 36.9 % (ref 34–46.6)
HDLC SERPL-MCNC: 92 MG/DL (ref 40–60)
HGB BLD-MCNC: 12.9 G/DL (ref 12–15.9)
HGB UR QL STRIP.AUTO: NEGATIVE
IMM GRANULOCYTES # BLD AUTO: 0.06 10*3/MM3 (ref 0–0.05)
IMM GRANULOCYTES NFR BLD AUTO: 1 % (ref 0–0.5)
KETONES UR QL STRIP: NEGATIVE
LDLC SERPL CALC-MCNC: 81 MG/DL (ref 0–100)
LDLC/HDLC SERPL: 0.83 {RATIO}
LEUKOCYTE ESTERASE UR QL STRIP.AUTO: NEGATIVE
LYMPHOCYTES # BLD AUTO: 1.24 10*3/MM3 (ref 0.7–3.1)
LYMPHOCYTES NFR BLD AUTO: 21.6 % (ref 19.6–45.3)
MCH RBC QN AUTO: 35.1 PG (ref 26.6–33)
MCHC RBC AUTO-ENTMCNC: 35 G/DL (ref 31.5–35.7)
MCV RBC AUTO: 100.5 FL (ref 79–97)
MONOCYTES # BLD AUTO: 0.92 10*3/MM3 (ref 0.1–0.9)
MONOCYTES NFR BLD AUTO: 16 % (ref 5–12)
NEUTROPHILS NFR BLD AUTO: 3.31 10*3/MM3 (ref 1.7–7)
NEUTROPHILS NFR BLD AUTO: 57.6 % (ref 42.7–76)
NITRITE UR QL STRIP: NEGATIVE
NRBC BLD AUTO-RTO: 0 /100 WBC (ref 0–0.2)
PH UR STRIP.AUTO: 7.5 [PH] (ref 5–8)
PLATELET # BLD AUTO: 247 10*3/MM3 (ref 140–450)
PMV BLD AUTO: 9.2 FL (ref 6–12)
POTASSIUM SERPL-SCNC: 4.3 MMOL/L (ref 3.5–5.2)
PROT SERPL-MCNC: 7.1 G/DL (ref 6–8.5)
PROT UR QL STRIP: NEGATIVE
RBC # BLD AUTO: 3.67 10*6/MM3 (ref 3.77–5.28)
SODIUM SERPL-SCNC: 138 MMOL/L (ref 136–145)
SP GR UR STRIP: 1.01 (ref 1–1.03)
TRIGL SERPL-MCNC: 137 MG/DL (ref 0–150)
TSH SERPL DL<=0.05 MIU/L-ACNC: 5.15 UIU/ML (ref 0.27–4.2)
UROBILINOGEN UR QL STRIP: NORMAL
VLDLC SERPL-MCNC: 23 MG/DL (ref 5–40)
WBC # BLD AUTO: 5.75 10*3/MM3 (ref 3.4–10.8)

## 2021-08-20 PROCEDURE — 82306 VITAMIN D 25 HYDROXY: CPT | Performed by: FAMILY MEDICINE

## 2021-08-20 PROCEDURE — 86803 HEPATITIS C AB TEST: CPT | Performed by: FAMILY MEDICINE

## 2021-08-20 PROCEDURE — 85025 COMPLETE CBC W/AUTO DIFF WBC: CPT | Performed by: FAMILY MEDICINE

## 2021-08-20 PROCEDURE — 36415 COLL VENOUS BLD VENIPUNCTURE: CPT

## 2021-08-20 PROCEDURE — 84443 ASSAY THYROID STIM HORMONE: CPT | Performed by: FAMILY MEDICINE

## 2021-08-20 PROCEDURE — 80053 COMPREHEN METABOLIC PANEL: CPT | Performed by: FAMILY MEDICINE

## 2021-08-20 PROCEDURE — 81003 URINALYSIS AUTO W/O SCOPE: CPT | Performed by: FAMILY MEDICINE

## 2021-08-20 PROCEDURE — 80061 LIPID PANEL: CPT | Performed by: FAMILY MEDICINE

## 2021-08-21 LAB
25(OH)D3 SERPL-MCNC: 58.6 NG/ML (ref 30–100)
HCV AB SER DONR QL: NORMAL

## 2021-08-22 RX ORDER — LEVOTHYROXINE SODIUM 0.03 MG/1
25 TABLET ORAL DAILY
Qty: 90 TABLET | Refills: 2 | Status: SHIPPED | OUTPATIENT
Start: 2021-08-22 | End: 2022-04-05

## 2021-08-22 NOTE — PROGRESS NOTES
Arlet tell Lori that her labs indicate she needs a little thyroid replacement.  I called in a low-dose thyroid replacement for her and want her to take it daily and in about 8 to 10 weeks lets recheck thyroid function.   the rest of her testing looks great

## 2021-08-27 ENCOUNTER — OFFICE VISIT (OUTPATIENT)
Dept: FAMILY MEDICINE CLINIC | Facility: CLINIC | Age: 71
End: 2021-08-27

## 2021-08-27 VITALS
HEART RATE: 86 BPM | OXYGEN SATURATION: 94 % | SYSTOLIC BLOOD PRESSURE: 130 MMHG | HEIGHT: 62 IN | DIASTOLIC BLOOD PRESSURE: 60 MMHG | BODY MASS INDEX: 32.2 KG/M2 | WEIGHT: 175 LBS | TEMPERATURE: 97.1 F | RESPIRATION RATE: 16 BRPM

## 2021-08-27 DIAGNOSIS — Z78.0 POST-MENOPAUSAL: ICD-10-CM

## 2021-08-27 DIAGNOSIS — Z00.00 MEDICARE ANNUAL WELLNESS VISIT, SUBSEQUENT: Primary | ICD-10-CM

## 2021-08-27 DIAGNOSIS — M54.50 CHRONIC BILATERAL LOW BACK PAIN WITHOUT SCIATICA: ICD-10-CM

## 2021-08-27 DIAGNOSIS — G89.29 CHRONIC BILATERAL LOW BACK PAIN WITHOUT SCIATICA: ICD-10-CM

## 2021-08-27 DIAGNOSIS — K58.2 IRRITABLE BOWEL SYNDROME WITH BOTH CONSTIPATION AND DIARRHEA: ICD-10-CM

## 2021-08-27 DIAGNOSIS — Z12.11 ENCOUNTER FOR SCREENING FOR MALIGNANT NEOPLASM OF COLON: ICD-10-CM

## 2021-08-27 DIAGNOSIS — J68.3 REACTIVE AIRWAYS DYSFUNCTION SYNDROME (HCC): ICD-10-CM

## 2021-08-27 DIAGNOSIS — Z87.891 PERSONAL HISTORY OF NICOTINE DEPENDENCE: ICD-10-CM

## 2021-08-27 DIAGNOSIS — I10 ESSENTIAL HYPERTENSION: ICD-10-CM

## 2021-08-27 DIAGNOSIS — E78.2 MIXED HYPERLIPIDEMIA: ICD-10-CM

## 2021-08-27 PROCEDURE — 99214 OFFICE O/P EST MOD 30 MIN: CPT | Performed by: FAMILY MEDICINE

## 2021-08-27 NOTE — PATIENT INSTRUCTIONS
Medicare Wellness  Personal Prevention Plan of Service     Date of Office Visit:  2021  Encounter Provider:  Ayaz Luna MD  Place of Service:  [facility]  Patient Name: Lori Sutton  :  1950    As part of the Medicare Wellness portion of your visit today, we are providing you with this personalized preventive plan of services (PPPS). This plan is based upon recommendations of the [place] Preventive Services Task Force (USPSTF) and the Advisory Committee on Immunization Practices (ACIP).    This lists the preventive care services that should be considered, and provides dates of when you are due. Items listed as completed are up-to-date and do not require any further intervention.    Health Maintenance   Topic Date Due   • DXA SCAN  Never done   • ANNUAL WELLNESS VISIT  2021   • INFLUENZA VACCINE  10/01/2021   • LIPID PANEL  2022   • MAMMOGRAM  2022   • COLORECTAL CANCER SCREENING  2027   • TDAP/TD VACCINES (2 - Td or Tdap) 10/02/2030   • HEPATITIS C SCREENING  Completed   • COVID-19 Vaccine  Completed   • Pneumococcal Vaccine 65+  Completed   • ZOSTER VACCINE  Completed       No orders of the defined types were placed in this encounter.      No follow-ups on file.

## 2021-08-27 NOTE — ASSESSMENT & PLAN NOTE
Patient still has symptoms of irritable bowel syndrome.  She is found both omeprazole and Pepcid help control her symptomatology.  She is doing very well with this.

## 2021-08-27 NOTE — PROGRESS NOTES
The ABCs of the Annual Wellness Visit  Subsequent Medicare Wellness Visit    Chief Complaint   Patient presents with   • Medicare Wellness-subsequent       Subjective   History of Present Illness:  Lori Sutton probably time big day and far as her health your overall health was concerned is a 71 y.o. female who presents for a Subsequent Medicare Wellness Visit.  Upon arrival to the room the patient underwent the Medicare health risk assessment.  Neither the questions themselves or the answers that were given prompted any major concern on the part of the patient or by the medical staff that gave the assessment.  As far as the preventative care examinations and the preventative care immunizations that this patient requires they are as listed below.   Screening tests recommended:    Colonoscopy-- she will use Cologuard  Mammogram--up-to-date  DEXA--scheduled now  PAP/ Pelvic--previous hysterectomy        Immunization:  Influenza-up-to-date but she had this kind of whether you are stockings will completed and get a physical labs so really not too much to change keep using of the medications you are taking for your blood pressure and for your your cholesterol working great keep using your Symbicort and your albuterol for your lungs and schedule you for  Prevnar-up-to-date  Pneumovax-up-to-date  Tetanus-up-to-date  Shingles vaccine-up-to-date  Covid--up-to-date                          Lori is a 71-year-old white female who is also here today for a physical exam and to review her lab test that she did recently.  Lori is postmenopausal.  She has hypertension that we treat along with hyperlipidemia.  She has a history of irritable bowel syndrome and chronic low back pain.  She is a previous smoker but quit 13 years ago.  She is left with some mild chronic bronchitis mild reactive airway disease and mild upper lobe emphysema.  She uses inhalers in the form of Symbicort and does quite well with these.  We will do an  exam today and make adjustments in her medicines if needed.        General Comments - FH:  Mother-- at 79--pancreatic CA  Father-- at 80s from recurrent CVAs.  Br--1-- prostate cancers  sis--1-alive and well.--some small strokesHysterectomy      Family History Risk Factors:  Family History of MI in females < 65 years old: no  Family History of MI in males < 55 years old: no                  HEALTH RISK ASSESSMENT    Recent Hospitalizations:  No hospitalization(s) within the last year.    Current Medical Providers:  Patient Care Team:  Ayaz Luna MD as PCP - Amanda Simon MD as Consulting Physician (Plastic Surgery)    Smoking Status:  Social History     Tobacco Use   Smoking Status Former Smoker   • Years: 40.00   • Quit date: 2006   • Years since quitting: 15.6   Smokeless Tobacco Never Used       Alcohol Consumption:  Social History     Substance and Sexual Activity   Alcohol Use Yes    Comment: daily, vodka tonic       Depression Screen:   PHQ-2/PHQ-9 Depression Screening 2021   Little interest or pleasure in doing things 0   Feeling down, depressed, or hopeless -   Total Score 0       Fall Risk Screen:  STEADI Fall Risk Assessment was completed, and patient is at LOW risk for falls.Assessment completed on:2021    Health Habits and Functional and Cognitive Screening:  Functional & Cognitive Status 2021   Do you have difficulty preparing food and eating? No   Do you have difficulty bathing yourself, getting dressed or grooming yourself? No   Do you have difficulty using the toilet? No   Do you have difficulty moving around from place to place? No   Do you have trouble with steps or getting out of a bed or a chair? No   Current Diet Well Balanced Diet   Dental Exam Up to date   Eye Exam Up to date   Exercise (times per week) 7 times per week   Current Exercises Include (No Data)        Exercise Comment leg lifts   Current Exercise Activities Include -   Do  you need help using the phone?  No   Are you deaf or do you have serious difficulty hearing?  Yes   Do you need help with transportation? No   Do you need help shopping? No   Do you need help preparing meals?  No   Do you need help with housework?  No   Do you need help with laundry? No   Do you need help taking your medications? No   Do you need help managing money? No   Do you ever drive or ride in a car without wearing a seat belt? No   Have you felt unusual stress, anger or loneliness in the last month? No   Who do you live with? Alone   If you need help, do you have trouble finding someone available to you? No   Have you been bothered in the last four weeks by sexual problems? -   Do you have difficulty concentrating, remembering or making decisions? No         Does the patient have evidence of cognitive impairment? No    Asprin use counseling:Does not need ASA (and currently is not on it)    Age-appropriate Screening Schedule:  Refer to the list below for future screening recommendations based on patient's age, sex and/or medical conditions. Orders for these recommended tests are listed in the plan section. The patient has been provided with a written plan.    Health Maintenance   Topic Date Due   • DXA SCAN  Never done   • INFLUENZA VACCINE  10/01/2021   • LIPID PANEL  08/20/2022   • MAMMOGRAM  09/21/2022   • TDAP/TD VACCINES (2 - Td or Tdap) 10/02/2030   • ZOSTER VACCINE  Completed          The following portions of the patient's history were reviewed and updated as appropriate: allergies, current medications, past family history, past medical history, past social history, past surgical history and problem list.    Outpatient Medications Prior to Visit   Medication Sig Dispense Refill   • ALBUTEROL SULFATE  (90 Base) MCG/ACT inhaler INHALE 2 PUFFS BY MOUTH EVERY 4 HOURS AS NEEDED FOR WHEEZING 42.5 g 2   • allopurinol (ZYLOPRIM) 300 MG tablet TAKE 1 TABLET BY MOUTH  DAILY 90 tablet 3   • atorvastatin  (LIPITOR) 20 MG tablet TAKE 1 TABLET BY MOUTH  DAILY 90 tablet 3   • B Complex Vitamins (VITAMIN B COMPLEX PO) VITAMIN B COMPLEX TABS     • budesonide-formoterol (Symbicort) 80-4.5 MCG/ACT inhaler Inhale 2 puffs 2 (Two) Times a Day for 30 days. 10.2 g 10   • Cholecalciferol 1000 units tablet VITAMIN D TABS     • citalopram (CeleXA) 20 MG tablet TAKE 1 TABLET BY MOUTH  DAILY 90 tablet 3   • hydrALAZINE (APRESOLINE) 50 MG tablet Take 1 tablet by mouth 3 (Three) Times a Day for 30 days. 90 tablet 11   • levothyroxine (Synthroid) 25 MCG tablet Take 1 tablet by mouth Daily for 90 days. 90 tablet 2   • metoprolol succinate XL (TOPROL-XL) 100 MG 24 hr tablet TAKE 1 TABLET BY MOUTH  DAILY 90 tablet 3   • montelukast (Singulair) 10 MG tablet Take 1 tablet by mouth Every Night for 90 days. 90 tablet 2   • omeprazole (priLOSEC) 40 MG capsule TAKE 1 CAPSULE BY MOUTH  EVERY DAY 90 capsule 3   • valsartan-hydrochlorothiazide (DIOVAN-HCT) 320-25 MG per tablet Take 1 tablet by mouth Daily for 90 days. 90 tablet 3   • vitamin E 100 UNIT capsule VITAMIN E CAPS     • Fluzone High-Dose Quadrivalent 0.7 ML suspension prefilled syringe PHARMACIST ADMINISTERED IMMUNIZATION ADMINISTERED AT TIME OF DISPENSING     • triamcinolone (KENALOG) 0.1 % ointment APPLY TOPICALLY TO LOWER LEG EVERY DAY     • triamcinolone (KENALOG) 0.5 % ointment Apply  topically to the appropriate area as directed 2 (Two) Times a Day. 60 g 1   • potassium chloride (K-DUR) 10 MEQ CR tablet Take 20 mEq by mouth 2 (Two) Times a Day.     • predniSONE (DELTASONE) 20 MG tablet Take 3 po qd for 4d  then 2 qd for 4d then take 1 qd for 4d  then 1/2 qd for 2d 25 tablet 1     No facility-administered medications prior to visit.       Patient Active Problem List   Diagnosis   • Medicare annual wellness visit, subsequent   • Hypertension   • Hyperlipidemia   • Fibrocystic breast   • Back pain   • Bilateral impacted cerumen   • Colon polyps   • Depression   • Diverticulitis   •  Edema   • Gout   • Irritable bowel syndrome   • Lymphedema of lower extremity   • Peripheral venous insufficiency   • Polyosteoarthritis   • Reactive airways dysfunction syndrome (CMS/HCC)   • Cough   • Abnormal finding of blood chemistry, unspecified    • Vitamin D deficiency, unspecified        Advanced Care Planning:  ACP discussion was held with the patient during this visit. Patient has an advance directive in EMR which is still valid.     Review of Systems   Constitutional: Negative.  Negative for fatigue.   HENT: Negative.  Negative for congestion, ear pain, postnasal drip, rhinorrhea, sore throat, trouble swallowing and voice change.    Eyes: Negative.  Negative for redness and visual disturbance.   Respiratory: Negative.  Negative for cough, chest tightness and shortness of breath.    Cardiovascular: Negative.  Negative for chest pain and palpitations.   Gastrointestinal: Negative.  Negative for abdominal distention and nausea.   Endocrine: Negative.  Negative for heat intolerance and polydipsia.   Genitourinary: Negative.  Negative for decreased urine volume, difficulty urinating, flank pain and pelvic pain.   Musculoskeletal: Negative.  Negative for arthralgias, back pain, myalgias and neck stiffness.   Skin: Negative.  Negative for rash.   Allergic/Immunologic: Negative.  Negative for environmental allergies and food allergies.   Neurological: Negative.  Negative for syncope, speech difficulty, weakness and light-headedness.   Hematological: Negative.  Negative for adenopathy.   Psychiatric/Behavioral: Negative.  Negative for behavioral problems, confusion and dysphoric mood. The patient is not nervous/anxious.    All other systems reviewed and are negative.      Compared to one year ago, the patient feels her physical health is the same.  Compared to one year ago, the patient feels her mental health is the same.    Reviewed chart for potential of high risk medication in the elderly: yes  Reviewed chart  "for potential of harmful drug interactions in the elderly:yes    Objective         Vitals:    08/27/21 1544   BP: 130/60   BP Location: Right arm   Pulse: 86   Resp: 16   Temp: 97.1 °F (36.2 °C)   TempSrc: Infrared   SpO2: 94%   Weight: 79.4 kg (175 lb)   Height: 157.5 cm (62\")       Body mass index is 32.01 kg/m².  Discussed the patient's BMI with her. The BMI is above average; BMI management plan is completed.    Physical Exam  Constitutional:       Appearance: Normal appearance. She is well-developed and normal weight.   HENT:      Head: Normocephalic and atraumatic.      Right Ear: Tympanic membrane, ear canal and external ear normal.      Left Ear: Tympanic membrane, ear canal and external ear normal.      Nose: Nose normal.      Mouth/Throat:      Mouth: Mucous membranes are moist.      Pharynx: Oropharynx is clear. No oropharyngeal exudate.   Eyes:      Extraocular Movements: Extraocular movements intact.      Conjunctiva/sclera: Conjunctivae normal.      Pupils: Pupils are equal, round, and reactive to light.   Cardiovascular:      Rate and Rhythm: Normal rate and regular rhythm.      Pulses: Normal pulses.      Heart sounds: Normal heart sounds.   Pulmonary:      Effort: Pulmonary effort is normal.      Breath sounds: Normal breath sounds.   Abdominal:      General: Bowel sounds are normal.      Palpations: Abdomen is soft.   Musculoskeletal:         General: Normal range of motion.      Cervical back: Normal range of motion and neck supple.   Skin:     General: Skin is warm and dry.   Neurological:      General: No focal deficit present.      Mental Status: She is alert and oriented to person, place, and time. Mental status is at baseline.   Psychiatric:         Mood and Affect: Mood normal.         Behavior: Behavior normal.         Thought Content: Thought content normal.         Judgment: Judgment normal.         Lab Results   Component Value Date    TRIG 137 08/20/2021    HDL 92 (H) 08/20/2021    LDL " 81 08/20/2021    VLDL 23 08/20/2021        Assessment/Plan   Medicare Risks and Personalized Health Plan  CMS Preventative Services Quick Reference  Abdominal Aortic Aneurysm Screening  Advance Directive Discussion  Breast Cancer/Mammogram Screening  Cardiovascular risk  Colon Cancer Screening  Glaucoma Risk  Hearing Problem  Immunizations Discussed/Encouraged (specific immunizations; UP TO DATE WITH VACCINES )  Inadequate Social Support, Isolation, Loneliness, Lack of Transportation, Financial Difficulties, or Caregiver Stress   Lung Cancer Risk  Osteoporosis Risk    The above risks/problems have been discussed with the patient.  Pertinent information has been shared with the patient in the After Visit Summary.  Follow up plans and orders are seen below in the Assessment/Plan Section.    Diagnoses and all orders for this visit:    1. Medicare annual wellness visit, subsequent (Primary)  Assessment & Plan:  Lori Sutton is a 71 y.o. female who presents for a Subsequent Medicare Wellness Visit.  Upon arrival to the room the patient underwent the Medicare health risk assessment.  Neither the questions themselves or the answers that were given prompted any major concern on the part of the patient or by the medical staff that gave the assessment.  As far as the preventative care examinations and the preventative care immunizations that this patient requires they are as listed below.   Screening tests recommended:    Colonoscopy-- she will use Cologuard  Mammogram--up-to-date  DEXA--scheduled now  PAP/ Pelvic--previous hysterectomy        Immunization:  Influenza-up-to-date  Prevnar-up-to-date  Pneumovax-up-to-date  Tetanus-up-to-date  Shingles vaccine-up-to-date  Covid--up-to-date                            2. Post-menopausal  -     DEXA Bone Density Axial; Future    3. Essential hypertension  Assessment & Plan:  Patient is taking valsartan.  And she is taking metoprolol.  She is also on hydralazine.  This combination  of medicines has her blood pressure 130 over 60.      4. Mixed hyperlipidemia  Assessment & Plan:  Patient's lipid panel was done recently and her cholesterol total and LDL are excellent and her HDL cholesterol is in the 90s which is excellent.  Her triglycerides were slightly elevated but not excessively.  She is taking atorvastatin 20 mg daily and this will be continued.      5. Irritable bowel syndrome with both constipation and diarrhea  Assessment & Plan:  Patient still has symptoms of irritable bowel syndrome.  She is found both omeprazole and Pepcid help control her symptomatology.  She is doing very well with this.      6. Chronic bilateral low back pain without sciatica  Assessment & Plan:  Patient continues to have back pain but she does not take any medication as such for it.  When she has a flareup she usually just keeps moving stretching and doing what ever needs to be done and the pain will work itself out.      7. Reactive airways dysfunction syndrome (CMS/HCC)  Assessment & Plan:  Patient has an element of emphysema noted on previous CT scan.  She also has a reactive airway disease that she treats with an albuterol inhaler and Symbicort.  She seems to keep the reactive airway component under good control with these medicines.  She has an element of chronic bronchitis as evidenced by daily cough with sputum production.  All in all the patient quit smoking many years ago and has made a big difference in how this problem has progressed.      Follow Up:  Return in about 6 months (around 2/27/2022) for Recheck.     An After Visit Summary and PPPS were given to the patient.

## 2021-08-27 NOTE — ASSESSMENT & PLAN NOTE
Patient's lipid panel was done recently and her cholesterol total and LDL are excellent and her HDL cholesterol is in the 90s which is excellent.  Her triglycerides were slightly elevated but not excessively.  She is taking atorvastatin 20 mg daily and this will be continued.

## 2021-08-27 NOTE — ASSESSMENT & PLAN NOTE
Patient is taking valsartan.  And she is taking metoprolol.  She is also on hydralazine.  This combination of medicines has her blood pressure 130 over 60.

## 2021-08-27 NOTE — ASSESSMENT & PLAN NOTE
Patient continues to have back pain but she does not take any medication as such for it.  When she has a flareup she usually just keeps moving stretching and doing what ever needs to be done and the pain will work itself out.

## 2021-08-27 NOTE — ASSESSMENT & PLAN NOTE
Patient has an element of emphysema noted on previous CT scan.  She also has a reactive airway disease that she treats with an albuterol inhaler and Symbicort.  She seems to keep the reactive airway component under good control with these medicines.  She has an element of chronic bronchitis as evidenced by daily cough with sputum production.  All in all the patient quit smoking many years ago and has made a big difference in how this problem has progressed.

## 2021-08-27 NOTE — ASSESSMENT & PLAN NOTE
Lori Sutton is a 71 y.o. female who presents for a Subsequent Medicare Wellness Visit.  Upon arrival to the room the patient underwent the Medicare health risk assessment.  Neither the questions themselves or the answers that were given prompted any major concern on the part of the patient or by the medical staff that gave the assessment.  As far as the preventative care examinations and the preventative care immunizations that this patient requires they are as listed below.   Screening tests recommended:    Colonoscopy-- she will use Cologuard  Mammogram--up-to-date  DEXA--scheduled now  PAP/ Pelvic--previous hysterectomy        Immunization:  Influenza-up-to-date  Prevnar-up-to-date  Pneumovax-up-to-date  Tetanus-up-to-date  Shingles vaccine-up-to-date  Covid--up-to-date

## 2021-09-17 ENCOUNTER — TELEPHONE (OUTPATIENT)
Dept: FAMILY MEDICINE CLINIC | Facility: CLINIC | Age: 71
End: 2021-09-17

## 2021-09-17 NOTE — TELEPHONE ENCOUNTER
Caller: Lori Sutton    Relationship: Self    Best call back number: [phone]     What medications are you currently taking:   Current Outpatient Medications on File Prior to Visit   Medication Sig Dispense Refill   • ALBUTEROL SULFATE  (90 Base) MCG/ACT inhaler INHALE 2 PUFFS BY MOUTH EVERY 4 HOURS AS NEEDED FOR WHEEZING 42.5 g 2   • allopurinol (ZYLOPRIM) 300 MG tablet TAKE 1 TABLET BY MOUTH  DAILY 90 tablet 3   • atorvastatin (LIPITOR) 20 MG tablet TAKE 1 TABLET BY MOUTH  DAILY 90 tablet 3   • B Complex Vitamins (VITAMIN B COMPLEX PO) VITAMIN B COMPLEX TABS     • budesonide-formoterol (Symbicort) 80-4.5 MCG/ACT inhaler Inhale 2 puffs 2 (Two) Times a Day for 30 days. 10.2 g 10   • Cholecalciferol 1000 units tablet VITAMIN D TABS     • citalopram (CeleXA) 20 MG tablet TAKE 1 TABLET BY MOUTH  DAILY 90 tablet 3   • Fluzone High-Dose Quadrivalent 0.7 ML suspension prefilled syringe PHARMACIST ADMINISTERED IMMUNIZATION ADMINISTERED AT TIME OF DISPENSING     • hydrALAZINE (APRESOLINE) 50 MG tablet Take 1 tablet by mouth 3 (Three) Times a Day for 30 days. 90 tablet 11   • levothyroxine (Synthroid) 25 MCG tablet Take 1 tablet by mouth Daily for 90 days. 90 tablet 2   • metoprolol succinate XL (TOPROL-XL) 100 MG 24 hr tablet TAKE 1 TABLET BY MOUTH  DAILY 90 tablet 3   • montelukast (Singulair) 10 MG tablet Take 1 tablet by mouth Every Night for 90 days. 90 tablet 2   • omeprazole (priLOSEC) 40 MG capsule TAKE 1 CAPSULE BY MOUTH  EVERY DAY 90 capsule 3   • triamcinolone (KENALOG) 0.1 % ointment APPLY TOPICALLY TO LOWER LEG EVERY DAY     • triamcinolone (KENALOG) 0.5 % ointment Apply  topically to the appropriate area as directed 2 (Two) Times a Day. 60 g 1   • valsartan-hydrochlorothiazide (DIOVAN-HCT) 320-25 MG per tablet Take 1 tablet by mouth Daily for 90 days. 90 tablet 3   • vitamin E 100 UNIT capsule VITAMIN E CAPS       No current facility-administered medications on file prior to visit.           When did you start taking these medications: 1 WEEK AGO    Which medication are you concerned about: THYROID MEDICATION(   SHE DOES NOT KNOW NAME OF MEDICATION)    Who prescribed you this medication: DR. QUEEN    What are your concerns: MS ROBERTSON WOULD LIKE TO TRY A DIFFERENT TYPE OF THYROID MEDICATION THE MEDICATION WAS CAUSING HER TO VOMIT. SHE HAS DISCONTINUED THE MEDICATION. SHE IS WANTING TO KNOW IF DR. QUEEN CAN PRESCRIBE A DIFFERENT MEDICATION.      How long have you had these concerns: 1 WEEK AGO

## 2021-09-17 NOTE — TELEPHONE ENCOUNTER
Is not the Synthroid that was causing her to vomit but the Rybelsus is causing it.  I am going to go ahead and have her hold the levothyroxine for now until she gets used to Rybelsus and then we will add levothyroxine  back in.

## 2021-09-27 RX ORDER — ALLOPURINOL 300 MG/1
TABLET ORAL
Qty: 90 TABLET | Refills: 3 | Status: SHIPPED | OUTPATIENT
Start: 2021-09-27 | End: 2022-08-12

## 2021-09-27 RX ORDER — MONTELUKAST SODIUM 10 MG/1
TABLET ORAL
Qty: 90 TABLET | Refills: 3 | Status: SHIPPED | OUTPATIENT
Start: 2021-09-27 | End: 2022-08-12

## 2021-09-27 RX ORDER — METOPROLOL SUCCINATE 100 MG/1
TABLET, EXTENDED RELEASE ORAL
Qty: 90 TABLET | Refills: 3 | Status: SHIPPED | OUTPATIENT
Start: 2021-09-27 | End: 2022-08-05

## 2021-10-01 ENCOUNTER — TRANSCRIBE ORDERS (OUTPATIENT)
Dept: FAMILY MEDICINE CLINIC | Facility: CLINIC | Age: 71
End: 2021-10-01

## 2021-10-01 DIAGNOSIS — Z12.31 SCREENING MAMMOGRAM FOR HIGH-RISK PATIENT: Primary | ICD-10-CM

## 2021-10-07 ENCOUNTER — HOSPITAL ENCOUNTER (OUTPATIENT)
Dept: MAMMOGRAPHY | Facility: HOSPITAL | Age: 71
Discharge: HOME | End: 2021-10-07
Admitting: FAMILY MEDICINE

## 2021-10-07 DIAGNOSIS — Z12.31 SCREENING MAMMOGRAM FOR HIGH-RISK PATIENT: ICD-10-CM

## 2021-10-07 PROCEDURE — 77063 BREAST TOMOSYNTHESIS BI: CPT

## 2021-10-07 PROCEDURE — 77067 SCR MAMMO BI INCL CAD: CPT

## 2021-10-11 ENCOUNTER — TELEPHONE (OUTPATIENT)
Dept: FAMILY MEDICINE CLINIC | Facility: CLINIC | Age: 71
End: 2021-10-11

## 2021-10-11 DIAGNOSIS — H91.91 HEARING LOSS OF RIGHT EAR, UNSPECIFIED HEARING LOSS TYPE: Primary | ICD-10-CM

## 2021-10-11 NOTE — TELEPHONE ENCOUNTER
Caller: Lori Sutton    Relationship: Self    Best call back [phone]     What is the medical concern/diagnosis: HEARING AID IS NOT WORKING ON RIGHT EAR    What specialty or service is being requested: COCHLEAR IMPLANT      What is the provider, practice or medical service name: [facility]     What is the office location: [facility]     What is the office phone number: P [phone] OR P) [phone]   (F) [phone]    Any additional details:    MS SUTTON IS NEEDING A REFERRAL SENT, BEFORE HER APPOINTMENT IS SCHEDULED

## 2021-10-17 RX ORDER — ATORVASTATIN CALCIUM 20 MG/1
TABLET, FILM COATED ORAL
Qty: 90 TABLET | Refills: 3 | Status: SHIPPED | OUTPATIENT
Start: 2021-10-17 | End: 2022-09-06

## 2021-10-17 RX ORDER — CITALOPRAM 20 MG/1
TABLET ORAL
Qty: 90 TABLET | Refills: 3 | Status: SHIPPED | OUTPATIENT
Start: 2021-10-17 | End: 2022-09-06

## 2021-10-26 DIAGNOSIS — Z78.0 POST-MENOPAUSAL: ICD-10-CM

## 2021-10-26 DIAGNOSIS — E78.2 MIXED HYPERLIPIDEMIA: ICD-10-CM

## 2021-10-26 DIAGNOSIS — M54.50 CHRONIC BILATERAL LOW BACK PAIN WITHOUT SCIATICA: ICD-10-CM

## 2021-10-26 DIAGNOSIS — J68.3 REACTIVE AIRWAYS DYSFUNCTION SYNDROME (HCC): ICD-10-CM

## 2021-10-26 DIAGNOSIS — K58.2 IRRITABLE BOWEL SYNDROME WITH BOTH CONSTIPATION AND DIARRHEA: ICD-10-CM

## 2021-10-26 DIAGNOSIS — Z87.891 PERSONAL HISTORY OF NICOTINE DEPENDENCE: ICD-10-CM

## 2021-10-26 DIAGNOSIS — Z00.00 MEDICARE ANNUAL WELLNESS VISIT, SUBSEQUENT: ICD-10-CM

## 2021-10-26 DIAGNOSIS — G89.29 CHRONIC BILATERAL LOW BACK PAIN WITHOUT SCIATICA: ICD-10-CM

## 2021-10-26 DIAGNOSIS — I10 ESSENTIAL HYPERTENSION: ICD-10-CM

## 2021-10-27 ENCOUNTER — TELEPHONE (OUTPATIENT)
Dept: FAMILY MEDICINE CLINIC | Facility: CLINIC | Age: 71
End: 2021-10-27

## 2021-10-27 RX ORDER — IBANDRONATE SODIUM 150 MG/1
150 TABLET, FILM COATED ORAL
Qty: 3 TABLET | Refills: 3 | Status: SHIPPED | OUTPATIENT
Start: 2021-10-27 | End: 2021-10-27 | Stop reason: SDUPTHER

## 2021-10-27 RX ORDER — IBANDRONATE SODIUM 150 MG/1
150 TABLET, FILM COATED ORAL
Qty: 3 TABLET | Refills: 3 | Status: SHIPPED | OUTPATIENT
Start: 2021-10-27 | End: 2022-09-07

## 2021-10-27 NOTE — TELEPHONE ENCOUNTER
Caller: LesleyLori    Relationship: Self    Best call back number:[phone]     Caller requesting test results:     What test was performed: BONE DENSITY, CT SCAN     When was the test performed: 10/22/21    Where was the test performed: [facility]     Additional notes:

## 2021-10-27 NOTE — TELEPHONE ENCOUNTER
Tell Lori she has osteopenia which is mild weakness of the bone.  She needs calcium and vitamin D in a medication called Boniva once a month.  The calcium and vitamin D she takes daily.  Have her  Caltrate 600 plus D and take 2 a day.  She can stay on her other extra vitamin D if she would like.  I will call him Boniva and she takes 1 a month.  Gets 150 mg once a month.  We will recheck the DEXA scan in 2 years.

## 2021-10-28 ENCOUNTER — PATIENT MESSAGE (OUTPATIENT)
Dept: FAMILY MEDICINE CLINIC | Facility: CLINIC | Age: 71
End: 2021-10-28

## 2021-10-28 RX ORDER — VALSARTAN AND HYDROCHLOROTHIAZIDE 320; 25 MG/1; MG/1
TABLET, FILM COATED ORAL
Qty: 90 TABLET | Refills: 3 | Status: SHIPPED | OUTPATIENT
Start: 2021-10-28 | End: 2022-09-14

## 2021-10-29 ENCOUNTER — HOSPITAL ENCOUNTER (OUTPATIENT)
Dept: MAMMOGRAPHY | Facility: HOSPITAL | Age: 71
Discharge: HOME | End: 2021-10-29

## 2021-10-29 ENCOUNTER — HOSPITAL ENCOUNTER (OUTPATIENT)
Dept: ULTRASOUND IMAGING | Facility: HOSPITAL | Age: 71
Discharge: HOME | End: 2021-10-29

## 2021-10-29 DIAGNOSIS — N64.89 BREAST ASYMMETRY: ICD-10-CM

## 2021-10-29 PROCEDURE — 77066 DX MAMMO INCL CAD BI: CPT

## 2021-10-29 PROCEDURE — G0279 TOMOSYNTHESIS, MAMMO: HCPCS

## 2021-10-29 PROCEDURE — 76642 ULTRASOUND BREAST LIMITED: CPT

## 2021-11-11 ENCOUNTER — TELEPHONE (OUTPATIENT)
Dept: FAMILY MEDICINE CLINIC | Facility: CLINIC | Age: 71
End: 2021-11-11

## 2021-11-11 NOTE — TELEPHONE ENCOUNTER
Tell Lori I have never seen Brisa do this to anyone before. Lets let things calm down for a few weeks and when next month old around the next time to take it again I would suggest waiting until she gets up in the morning around 7 or 8 AM and then take it. If it happens again this is a medication she just will have to do without.

## 2021-11-11 NOTE — TELEPHONE ENCOUNTER
----- Message from Leyla Jimenez CMA sent at 11/10/2021  5:14 PM EST -----  Regarding: FW: dexa scan results    ----- Message -----  From: Lori Sutton  Sent: 11/10/2021   3:55 PM EST  [facility]  Subject: dexa scan results                                I took the new monthly pill at 2:00 am sat up an hour  and got up around 5:00 am and too the genetic for synthroid.  At about 9:oo am I started vomiting.  I couldn't take any of my medication because any fliuid I ingested would come back up i n 20 min. I had a very bad headache all day and continued to vomit until early this morning.  So far I've eaten 3 crackers and a couple of spoonfuls of chicken soup.  i have taken some of my regular meds and so far so good.

## 2021-11-12 ENCOUNTER — TELEPHONE (OUTPATIENT)
Dept: FAMILY MEDICINE CLINIC | Facility: CLINIC | Age: 71
End: 2021-11-12

## 2021-11-12 ENCOUNTER — LAB (OUTPATIENT)
Dept: LAB | Facility: HOSPITAL | Age: 71
End: 2021-11-12

## 2021-11-12 DIAGNOSIS — R05.9 COUGH: Primary | ICD-10-CM

## 2021-11-12 DIAGNOSIS — R16.0 LIVER MASS: Primary | ICD-10-CM

## 2021-11-12 DIAGNOSIS — R05.9 COUGH: ICD-10-CM

## 2021-11-12 PROCEDURE — U0004 COV-19 TEST NON-CDC HGH THRU: HCPCS

## 2021-11-12 PROCEDURE — U0005 INFEC AGEN DETEC AMPLI PROBE: HCPCS

## 2021-11-12 RX ORDER — PROMETHAZINE HYDROCHLORIDE 25 MG/1
25 TABLET ORAL EVERY 8 HOURS PRN
Qty: 10 TABLET | Refills: 1 | Status: SHIPPED | OUTPATIENT
Start: 2021-11-12

## 2021-11-12 NOTE — TELEPHONE ENCOUNTER
Hub staff attempted to follow warm transfer process and was unsuccessful     Caller: BRITTANY      Relationship to patient: [facility]    Best call back number: [phone]    Patient is needing: BRITTANY IS WANTING TO KNOW IF LUNG CANCER SCREENING WAS RECEIVED ON 10/25/2021 . IF THE RESULTS WERE NOT RECEIVED SHE WOULD LIKE A CALL BACK, NO NEED TO CALL IF OFFICE RECEIVED.

## 2021-11-12 NOTE — TELEPHONE ENCOUNTER
Please tell pt that I have sent in phenergan- but would like her to get COVID test   (can do as drive through at the hospital) as having increase this week and her symptoms are concerning

## 2021-11-12 NOTE — TELEPHONE ENCOUNTER
Caller: Lori Sutton    Relationship: Self    Best call back number: [phone] (    What medication are you requesting: PREDNISONE AND PHENERGAN     What are your current symptoms: COUGHING AND NAUSEA    How long have you been experiencing symptoms: ABOUT A WEEK     Have you had these symptoms before:    [x] Yes  [] No    Have you been treated for these symptoms before:   [x] Yes  [] No    If a prescription is needed, what is your preferred pharmacy and phone number: [id number] - [place], [place] - [address]  - [phone] [facility] [phone] FX     Additional notes:

## 2021-11-13 LAB — SARS-COV-2 ORF1AB RESP QL NAA+PROBE: NOT DETECTED

## 2021-11-15 ENCOUNTER — TELEPHONE (OUTPATIENT)
Dept: FAMILY MEDICINE CLINIC | Facility: CLINIC | Age: 71
End: 2021-11-15

## 2021-11-15 PROBLEM — E55.9 VITAMIN D DEFICIENCY, UNSPECIFIED: Status: RESOLVED | Noted: 2020-08-24 | Resolved: 2021-11-15

## 2021-11-15 PROBLEM — R79.9 ABNORMAL FINDING OF BLOOD CHEMISTRY, UNSPECIFIED: Status: RESOLVED | Noted: 2020-08-24 | Resolved: 2021-11-15

## 2021-11-15 PROBLEM — K57.92 DIVERTICULITIS: Status: RESOLVED | Noted: 2018-05-16 | Resolved: 2021-11-15

## 2021-11-15 PROBLEM — H61.23 BILATERAL IMPACTED CERUMEN: Status: RESOLVED | Noted: 2017-04-10 | Resolved: 2021-11-15

## 2021-11-15 NOTE — TELEPHONE ENCOUNTER
.    Caller: Lori Sutton    Relationship: Self    Best call back number: [phone]        What test was performed: CT OF LUNGS    When was the test performed: 11/12/2021    Where was the test performed: [facility]     Additional notes:     MS SUTTON WOULD LIKE A CALL BACK REGARDING TEST RESULTS

## 2021-11-15 NOTE — TELEPHONE ENCOUNTER
Caller: Lori uStton    Relationship: Self    Best call back number: [phone]     What medication are you requesting: STEROID     What are your current symptoms: COUGH     How long have you been experiencing symptoms: 11/9/2021    Have you had these symptoms before:    [x] Yes  [] No    Have you been treated for these symptoms before:   [x] Yes  [] No    If a prescription is needed, what is your preferred pharmacy and phone number:        [id number] [place] - [address]  - [phone] [facility] [phone]   [phone]    Additional notes:    MS SUTTON WAS TOLD TO HAVE A COVID TEST BEFORE BEING PRESCRIBED STEROID, SHE WENT TO [facility] FOR COVID TEST ON 11/12/2021, TEST WAS NEGATIVE.

## 2021-11-16 NOTE — TELEPHONE ENCOUNTER
Arlet tell Lori that her CT scan of the chest was clear.  During the CT though they made some pictures of the upper abdomen and there is a 6 cm lesion in her liver that was not there before.  It is probably going to be a cyst of some sort but we cannot tell without further evaluation.  I have ordered an MRI with and without contrast.  She needs to get that done.  Her DEXA scan shows osteopenia so make sure she knows to take the calcium and vitamin D every day in the Boniva once a month.  After we get the MRI of the abdomen back we need to talk.

## 2021-12-08 ENCOUNTER — HOSPITAL ENCOUNTER (OUTPATIENT)
Dept: MRI IMAGING | Facility: HOSPITAL | Age: 71
Discharge: HOME | End: 2021-12-08
Admitting: FAMILY MEDICINE

## 2021-12-08 DIAGNOSIS — R16.0 LIVER MASS: ICD-10-CM

## 2021-12-08 LAB — CREAT BLDA-MCNC: 0.5 MG/DL (ref 0.6–1.3)

## 2021-12-08 PROCEDURE — 82565 ASSAY OF CREATININE: CPT

## 2021-12-08 PROCEDURE — A9579 GAD-BASE MR CONTRAST NOS,1ML: HCPCS | Performed by: FAMILY MEDICINE

## 2021-12-08 PROCEDURE — 25010000002 GADOTERIDOL PER 1 ML: Performed by: FAMILY MEDICINE

## 2021-12-08 PROCEDURE — 74183 MRI ABD W/O CNTR FLWD CNTR: CPT

## 2021-12-08 RX ADMIN — GADOTERIDOL 20 ML: 279.3 INJECTION, SOLUTION INTRAVENOUS at 12:43

## 2022-01-10 RX ORDER — OMEPRAZOLE 40 MG/1
CAPSULE, DELAYED RELEASE ORAL
Qty: 90 CAPSULE | Refills: 3 | Status: SHIPPED | OUTPATIENT
Start: 2022-01-10

## 2022-02-16 ENCOUNTER — TELEPHONE (OUTPATIENT)
Dept: FAMILY MEDICINE CLINIC | Facility: CLINIC | Age: 72
End: 2022-02-16

## 2022-02-16 NOTE — TELEPHONE ENCOUNTER
Caller: Lori Sutton    Relationship to patient: Self    Best call back number: [phone]    Patient is needing:     PATIENT CALLED AND SAID SHE HAD SOME SCREENINGS DONE WITH [facility] SCREENING AND NEEDED TO GIVE THE RESULTS TO DR. QUEEN    SHE HAD A SCREENING TEST FOR HIGH SENSITIVE C-REACTIVE PROTEIN - THE RESULT WAS 8.80     SHE SAID HER BMI WAS ALSO HIGH, BUT EVERY ELSE WAS NORMAL    SHE IS WANTING TO SEE IF DR. QUEEN WANTS TO SEE HER ABOUT THIS, AND ALSO IF SHE NEEDS TO BRING A HARD COPY OF THE RESULTS IN WITH HER

## 2022-03-02 ENCOUNTER — OFFICE VISIT (OUTPATIENT)
Dept: FAMILY MEDICINE CLINIC | Facility: CLINIC | Age: 72
End: 2022-03-02

## 2022-03-02 ENCOUNTER — LAB (OUTPATIENT)
Dept: FAMILY MEDICINE CLINIC | Facility: CLINIC | Age: 72
End: 2022-03-02

## 2022-03-02 VITALS
HEART RATE: 89 BPM | SYSTOLIC BLOOD PRESSURE: 122 MMHG | OXYGEN SATURATION: 94 % | DIASTOLIC BLOOD PRESSURE: 70 MMHG | TEMPERATURE: 96.8 F | WEIGHT: 170 LBS | BODY MASS INDEX: 31.28 KG/M2 | HEIGHT: 62 IN | RESPIRATION RATE: 16 BRPM

## 2022-03-02 DIAGNOSIS — E55.9 VITAMIN D DEFICIENCY, UNSPECIFIED: ICD-10-CM

## 2022-03-02 DIAGNOSIS — Z79.4 TYPE 2 DIABETES MELLITUS WITH DIABETIC AUTONOMIC NEUROPATHY, WITH LONG-TERM CURRENT USE OF INSULIN: ICD-10-CM

## 2022-03-02 DIAGNOSIS — M1A.00X0 IDIOPATHIC CHRONIC GOUT WITHOUT TOPHUS, UNSPECIFIED SITE: Primary | ICD-10-CM

## 2022-03-02 DIAGNOSIS — E03.9 ACQUIRED HYPOTHYROIDISM: ICD-10-CM

## 2022-03-02 DIAGNOSIS — I10 PRIMARY HYPERTENSION: ICD-10-CM

## 2022-03-02 DIAGNOSIS — J68.3 REACTIVE AIRWAYS DYSFUNCTION SYNDROME: ICD-10-CM

## 2022-03-02 DIAGNOSIS — E11.43 TYPE 2 DIABETES MELLITUS WITH DIABETIC AUTONOMIC NEUROPATHY, WITH LONG-TERM CURRENT USE OF INSULIN: ICD-10-CM

## 2022-03-02 DIAGNOSIS — G60.3 IDIOPATHIC PROGRESSIVE NEUROPATHY: ICD-10-CM

## 2022-03-02 DIAGNOSIS — M1A.0720 CHRONIC IDIOPATHIC GOUT INVOLVING TOE OF LEFT FOOT WITHOUT TOPHUS: ICD-10-CM

## 2022-03-02 DIAGNOSIS — E78.2 MIXED HYPERLIPIDEMIA: ICD-10-CM

## 2022-03-02 LAB
25(OH)D3 SERPL-MCNC: 94.7 NG/ML (ref 30–100)
ALBUMIN SERPL-MCNC: 4.6 G/DL (ref 3.5–5.2)
ALBUMIN/GLOB SERPL: 1.9 G/DL
ALP SERPL-CCNC: 74 U/L (ref 39–117)
ALT SERPL W P-5'-P-CCNC: 20 U/L (ref 1–33)
ANION GAP SERPL CALCULATED.3IONS-SCNC: 12.4 MMOL/L (ref 5–15)
AST SERPL-CCNC: 23 U/L (ref 1–32)
BACTERIA UR QL AUTO: NORMAL /HPF
BASOPHILS # BLD AUTO: 0.05 10*3/MM3 (ref 0–0.2)
BASOPHILS NFR BLD AUTO: 0.9 % (ref 0–1.5)
BILIRUB SERPL-MCNC: 0.5 MG/DL (ref 0–1.2)
BILIRUB UR QL STRIP: NEGATIVE
BUN SERPL-MCNC: 9 MG/DL (ref 8–23)
BUN/CREAT SERPL: 16.1 (ref 7–25)
CALCIUM SPEC-SCNC: 9.6 MG/DL (ref 8.6–10.5)
CHLORIDE SERPL-SCNC: 92 MMOL/L (ref 98–107)
CHOLEST SERPL-MCNC: 203 MG/DL (ref 0–200)
CLARITY UR: CLEAR
CO2 SERPL-SCNC: 32.6 MMOL/L (ref 22–29)
COLOR UR: YELLOW
CREAT SERPL-MCNC: 0.56 MG/DL (ref 0.57–1)
DEPRECATED RDW RBC AUTO: 43 FL (ref 37–54)
EGFRCR SERPLBLD CKD-EPI 2021: 95.9 ML/MIN/1.73
EOSINOPHIL # BLD AUTO: 0.17 10*3/MM3 (ref 0–0.4)
EOSINOPHIL NFR BLD AUTO: 3.2 % (ref 0.3–6.2)
ERYTHROCYTE [DISTWIDTH] IN BLOOD BY AUTOMATED COUNT: 11.5 % (ref 12.3–15.4)
GLOBULIN UR ELPH-MCNC: 2.4 GM/DL
GLUCOSE SERPL-MCNC: 124 MG/DL (ref 65–99)
GLUCOSE UR STRIP-MCNC: NEGATIVE MG/DL
HBA1C MFR BLD: 5.8 % (ref 3.5–5.6)
HCT VFR BLD AUTO: 41.5 % (ref 34–46.6)
HDLC SERPL-MCNC: 98 MG/DL (ref 40–60)
HGB BLD-MCNC: 14 G/DL (ref 12–15.9)
HGB UR QL STRIP.AUTO: NEGATIVE
HYALINE CASTS UR QL AUTO: NORMAL /LPF
KETONES UR QL STRIP: NEGATIVE
LDLC SERPL CALC-MCNC: 92 MG/DL (ref 0–100)
LDLC/HDLC SERPL: 0.92 {RATIO}
LEUKOCYTE ESTERASE UR QL STRIP.AUTO: ABNORMAL
LYMPHOCYTES # BLD AUTO: 0.83 10*3/MM3 (ref 0.7–3.1)
LYMPHOCYTES NFR BLD AUTO: 15.4 % (ref 19.6–45.3)
MCH RBC QN AUTO: 35.2 PG (ref 26.6–33)
MCHC RBC AUTO-ENTMCNC: 33.7 G/DL (ref 31.5–35.7)
MCV RBC AUTO: 104.3 FL (ref 79–97)
MONOCYTES # BLD AUTO: 0.76 10*3/MM3 (ref 0.1–0.9)
MONOCYTES NFR BLD AUTO: 14.1 % (ref 5–12)
NEUTROPHILS NFR BLD AUTO: 3.54 10*3/MM3 (ref 1.7–7)
NEUTROPHILS NFR BLD AUTO: 65.8 % (ref 42.7–76)
NITRITE UR QL STRIP: NEGATIVE
PH UR STRIP.AUTO: 8.5 [PH] (ref 5–8)
PLATELET # BLD AUTO: 242 10*3/MM3 (ref 140–450)
PMV BLD AUTO: 8.9 FL (ref 6–12)
POTASSIUM SERPL-SCNC: 4 MMOL/L (ref 3.5–5.2)
PROT SERPL-MCNC: 7 G/DL (ref 6–8.5)
PROT UR QL STRIP: NEGATIVE
RBC # BLD AUTO: 3.98 10*6/MM3 (ref 3.77–5.28)
RBC # UR STRIP: NORMAL /HPF
REF LAB TEST METHOD: NORMAL
SODIUM SERPL-SCNC: 137 MMOL/L (ref 136–145)
SP GR UR STRIP: 1.02 (ref 1–1.03)
SQUAMOUS #/AREA URNS HPF: NORMAL /HPF
T4 FREE SERPL-MCNC: 1.33 NG/DL (ref 0.93–1.7)
TRIGL SERPL-MCNC: 76 MG/DL (ref 0–150)
TSH SERPL DL<=0.05 MIU/L-ACNC: 3.16 UIU/ML (ref 0.27–4.2)
UROBILINOGEN UR QL STRIP: ABNORMAL
VIT B12 BLD-MCNC: >2000 PG/ML (ref 211–946)
VLDLC SERPL-MCNC: 13 MG/DL (ref 5–40)
WBC # UR STRIP: NORMAL /HPF
WBC NRBC COR # BLD: 5.38 10*3/MM3 (ref 3.4–10.8)

## 2022-03-02 PROCEDURE — 82607 VITAMIN B-12: CPT | Performed by: FAMILY MEDICINE

## 2022-03-02 PROCEDURE — 85025 COMPLETE CBC W/AUTO DIFF WBC: CPT | Performed by: FAMILY MEDICINE

## 2022-03-02 PROCEDURE — 82306 VITAMIN D 25 HYDROXY: CPT | Performed by: FAMILY MEDICINE

## 2022-03-02 PROCEDURE — 84443 ASSAY THYROID STIM HORMONE: CPT | Performed by: FAMILY MEDICINE

## 2022-03-02 PROCEDURE — 99213 OFFICE O/P EST LOW 20 MIN: CPT | Performed by: FAMILY MEDICINE

## 2022-03-02 PROCEDURE — 36415 COLL VENOUS BLD VENIPUNCTURE: CPT | Performed by: FAMILY MEDICINE

## 2022-03-02 PROCEDURE — 83036 HEMOGLOBIN GLYCOSYLATED A1C: CPT | Performed by: FAMILY MEDICINE

## 2022-03-02 PROCEDURE — 81001 URINALYSIS AUTO W/SCOPE: CPT | Performed by: FAMILY MEDICINE

## 2022-03-02 PROCEDURE — 84439 ASSAY OF FREE THYROXINE: CPT | Performed by: FAMILY MEDICINE

## 2022-03-02 PROCEDURE — 80061 LIPID PANEL: CPT | Performed by: FAMILY MEDICINE

## 2022-03-02 PROCEDURE — 80053 COMPREHEN METABOLIC PANEL: CPT | Performed by: FAMILY MEDICINE

## 2022-03-02 NOTE — PROGRESS NOTES
"Chief Complaint  Hypothyroidism and Numbness (hands)    Subjective          Lori Sutton presents to [facility]  For a 6 month follow up thyroid. She has numbness in both hands. Also to review a biometric screening.      The patient has consented to being recorded using [employer].    The patient presents today for a 6-month follow-up.    The patient states that her right ring finger became numb and now she complains her entire right hand is numb. She adds she is unable to \" a dime, I have to scoot everything\" and she states she is unable to put her earrings on due to not being able to feel the earring. The patient complains that her left hand is \"just starting\" and this is how her symptoms began in her right hand. She denies known frostbite as a child, or working with toxic chemicals during her lifetime. The patient denies any numbness in her bilateral feet.     She reports that she has started a calcium supplement. The patient adds she previously tried a monthly calcium supplement; however, this caused her to vomit constantly and develop a headache.     She complains of a spot on her chest that keeps forming a scab and despite removing the scab, it continues to \"form back\". The patient denies any gout attacks.     She endorses she has received all available doses of her COVID-19 vaccine at present and states \"I will continue to get them as long as they make them\".     Objective   Vital Signs:   /70 (BP Location: Right arm)   Pulse 89   Temp 96.8 °F (36 °C) (Infrared)   Resp 16   Ht 157.5 cm (62\")   Wt 77.1 kg (170 lb)   SpO2 94%   BMI 31.09 kg/m²     Physical Exam  Constitutional:       Appearance: Normal appearance. She is well-developed and normal weight.   HENT:      Head: Normocephalic and atraumatic.      Right Ear: Tympanic membrane, ear canal and external ear normal.      Left Ear: Tympanic membrane, ear canal and external ear normal.      Nose: Nose normal. "      Mouth/Throat:      Mouth: Mucous membranes are moist.      Pharynx: Oropharynx is clear. No oropharyngeal exudate.   Eyes:      Extraocular Movements: Extraocular movements intact.      Conjunctiva/sclera: Conjunctivae normal.      Pupils: Pupils are equal, round, and reactive to light.   Cardiovascular:      Rate and Rhythm: Normal rate and regular rhythm.      Pulses: Normal pulses.      Heart sounds: Normal heart sounds.   Pulmonary:      Effort: Pulmonary effort is normal.      Breath sounds: Normal breath sounds.   Abdominal:      General: Bowel sounds are normal.      Palpations: Abdomen is soft.   Musculoskeletal:         General: Normal range of motion.      Cervical back: Normal range of motion and neck supple.   Skin:     General: Skin is warm and dry.   Neurological:      General: No focal deficit present.      Mental Status: She is alert and oriented to person, place, and time. Mental status is at baseline.   Psychiatric:         Mood and Affect: Mood normal.         Behavior: Behavior normal.         Thought Content: Thought content normal.         Judgment: Judgment normal.        Result Review :                 Assessment and Plan    Diagnoses and all orders for this visit:    1. Idiopathic chronic gout without tophus, unspecified site (Primary)  -     Uric acid; Future    2. Type 2 diabetes mellitus with diabetic autonomic neuropathy, with long-term current use of insulin ([facility])  -     EMG & Nerve Conduction Test; Future  -     Comprehensive Metabolic Panel  -     CBC & Differential  -     Hemoglobin A1c  -     Lipid Panel  -     T4, Free  -     TSH  -     Urinalysis With Culture If Indicated - Urine, Clean Catch  -     Vitamin D 25 Hydroxy  -     Vitamin B12  -     Cancel: Manual Differential  -     Urinalysis, Microscopic Only - Urine, Clean Catch  - The patient has had diabetes mellitus for some time now. We will obtain an updated HbA1c. She monitors her blood glucose at home and states her  readings have been fairly good as far as she is concerned. The patient is trying to follow her diet; however, she is not extremely strict with it.    3. Primary hypertension  -     EMG & Nerve Conduction Test; Future  -     Comprehensive Metabolic Panel  -     CBC & Differential  -     Hemoglobin A1c  -     Lipid Panel  -     T4, Free  -     TSH  -     Urinalysis With Culture If Indicated - Urine, Clean Catch  -     Vitamin D 25 Hydroxy  -     Vitamin B12  -     Cancel: Manual Differential  -     Urinalysis, Microscopic Only - Urine, Clean Catch  - The patient's blood pressure on 03/02/2022 was 122/70 mmHg which is excellent; therefore, she will continue her current medication regimen.    4. Mixed hyperlipidemia  -     EMG & Nerve Conduction Test; Future  -     Comprehensive Metabolic Panel  -     CBC & Differential  -     Hemoglobin A1c  -     Lipid Panel  -     T4, Free  -     TSH  -     Urinalysis With Culture If Indicated - Urine, Clean Catch  -     Vitamin D 25 Hydroxy  -     Vitamin B12  -     Cancel: Manual Differential  -     Urinalysis, Microscopic Only - Urine, Clean Catch  - The patient is trying to follow a low carbohydrate diet and takes atorvastatin 20 mg once daily. A lipid panel will be obtained today and adjustments will be made accordingly.    5. Chronic idiopathic gout involving toe of left foot without tophus  -     EMG & Nerve Conduction Test; Future  -     Comprehensive Metabolic Panel  -     CBC & Differential  -     Hemoglobin A1c  -     Lipid Panel  -     T4, Free  -     TSH  -     Urinalysis With Culture If Indicated - Urine, Clean Catch  -     Vitamin D 25 Hydroxy  -     Vitamin B12  -     Cancel: Manual Differential  -     Urinalysis, Microscopic Only - Urine, Clean Catch  - The patient has a history of gout and hyperuricemia. I will recheck her levels today. She has not had any gout attacks recently; this is is encouraging.    6. Reactive airways dysfunction syndrome (HCC)  -     EMG &  Nerve Conduction Test; Future  -     Comprehensive Metabolic Panel  -     CBC & Differential  -     Hemoglobin A1c  -     Lipid Panel  -     T4, Free  -     TSH  -     Urinalysis With Culture If Indicated - Urine, Clean Catch  -     Vitamin D 25 Hydroxy  -     Vitamin B12  -     Cancel: Manual Differential  -     Urinalysis, Microscopic Only - Urine, Clean Catch  - The patient has a history of reactive airway disease; however, she is currently stable. Her lungs are clear today and she has inhalers she can use on an as needed basis in addition to Symbicort inhaler that she uses routinely every day. She is instructed to continue this indefinitely.    7. Acquired hypothyroidism  -     EMG & Nerve Conduction Test; Future  -     Comprehensive Metabolic Panel  -     CBC & Differential  -     Hemoglobin A1c  -     Lipid Panel  -     T4, Free  -     TSH  -     Urinalysis With Culture If Indicated - Urine, Clean Catch  -     Vitamin D 25 Hydroxy  -     Vitamin B12  -     Cancel: Manual Differential  -     Urinalysis, Microscopic Only - Urine, Clean Catch    8. Vitamin D deficiency, unspecified   -     Vitamin D 25 Hydroxy    9. Idiopathic progressive neuropathy        - The patient has had progressive symptoms as initially starting her right hand; however, she is beginning to complain of numbness in her left hand and her fingers are basically numb on the right hand. She is able to move the hand well, her  is good, and has good capillary refill; therefore, I do not believe there is a circulation problem. Despite this she has numbness. She will have laboratory studies obtained today and I would like for her to complete an EMG to see if we can isolate a cause for her numbness systemically.      Follow Up   Return in about 6 months (around 9/2/2022) for Medicare Wellness with labs the week before.  Patient was given instructions and counseling regarding her condition or for health maintenance advice. Please see specific  information pulled into the AVS if appropriate.     Transcribed from ambient dictation for Ayaz Luna MD by Shanell Vargas.  03/03/22   09:34 EST    Patient verbalized consent to the visit recording.  I have personally performed the services described in this document as transcribed by the above individual, and it is both accurate and complete.  Ayaz Luna MD  3/6/2022  13:30 EST

## 2022-03-03 NOTE — PROGRESS NOTES
Arlet tell Lori to cut her vitamin D dose in half.  What ever she is taking just take half of that a day.  She is almost to a toxic level of that so I do not want her to go over the top.  Everything else looks great on her labs no other changes needed.  Tell her she is doing a good job

## 2022-03-08 ENCOUNTER — TELEPHONE (OUTPATIENT)
Dept: FAMILY MEDICINE CLINIC | Facility: CLINIC | Age: 72
End: 2022-03-08

## 2022-03-08 DIAGNOSIS — E11.43 TYPE 2 DIABETES MELLITUS WITH DIABETIC AUTONOMIC NEUROPATHY, WITH LONG-TERM CURRENT USE OF INSULIN: Primary | ICD-10-CM

## 2022-03-08 DIAGNOSIS — Z79.4 TYPE 2 DIABETES MELLITUS WITH DIABETIC AUTONOMIC NEUROPATHY, WITH LONG-TERM CURRENT USE OF INSULIN: Primary | ICD-10-CM

## 2022-03-08 RX ORDER — ALCOHOL 2.38 KG/3.79L
1 GEL TOPICAL 2 TIMES DAILY
Qty: 180 CAPSULE | Refills: 3 | Status: SHIPPED | OUTPATIENT
Start: 2022-03-08 | End: 2023-01-18 | Stop reason: SDUPTHER

## 2022-03-08 NOTE — TELEPHONE ENCOUNTER
Caller: Lori Sutton    Relationship: Self    Best call back number: [phone]    What medication are you requesting:METANX    PATIENT WAS SEEN ON 3/2/22 AND WAS ADVISED TO GET MEDICATION BUT IT IS VERY EXPENSIVE WITHOUT A PRESCRIPTION. REQUESTING A SCRIPT SEN TO [facility]    If a prescription is needed, what is your preferred pharmacy and phone number: [facility] - [facility] [address] [place] [address], [address] - [phone] ph - [phone] FX     Additional notes:

## 2022-03-14 NOTE — PROGRESS NOTES
EMG and Nerve Conduction Studies    The complete report includes the data sheets.     Date of Study: 3/16/22    Referring Provider: DR. QUEEN    History:    This patient is a 72-year-old female who complains of numbness in the hands more in the right than the left.        Results:    1.  Right median sensory study was attempted with no sensory nerve action potential recorded..  Using the palmar technique the median Palmar distal latency is mildly prolonged with a low amplitude response.  The right median motor distal latency is within normal limits with normal forearm conduction velocity.    2.  The left median sensory and Palmar distal latencies were normal.  The left median motor distal latency and forearm conduction velocities were normal.    3.  The ulnar sensory on the left and the bilateral ulnar palmar latencies were normal.  The ulnar motor nerve conduction studies were essentially normal.  The left ulnar motor conduction velocities were normal below and across the elbow.  On the right the conduction velocity was mildly slow in the forearm but normal across the elbow with no significant difference comparing above to below elbow segment.    4.  EMG of the muscles examined in the bilateral C5-T1 myotomes were essentially normal.    Impression:    This is a mildly abnormal study.  There is some evidence of mild right median neuropathy at the wrist.  There is no evidence of significant left median neuropathy and no evidence of focal ulnar neuropathy at the elbow.  On EMG study there is no evidence of significant neurogenic change in the muscles examined the bilateral C5-T1 myotomes.      Electronically signed by :    Joseph Seipel, M.D.  March 16, 2022

## 2022-03-16 ENCOUNTER — PROCEDURE VISIT (OUTPATIENT)
Dept: NEUROLOGY | Facility: CLINIC | Age: 72
End: 2022-03-16

## 2022-03-16 VITALS
HEART RATE: 78 BPM | SYSTOLIC BLOOD PRESSURE: 140 MMHG | TEMPERATURE: 97.1 F | HEIGHT: 62 IN | BODY MASS INDEX: 31.28 KG/M2 | DIASTOLIC BLOOD PRESSURE: 88 MMHG | WEIGHT: 170 LBS

## 2022-03-16 DIAGNOSIS — R20.2 PARESTHESIA OF BOTH HANDS: Primary | ICD-10-CM

## 2022-03-16 PROCEDURE — 95886 MUSC TEST DONE W/N TEST COMP: CPT | Performed by: PSYCHIATRY & NEUROLOGY

## 2022-03-16 PROCEDURE — 95910 NRV CNDJ TEST 7-8 STUDIES: CPT | Performed by: PSYCHIATRY & NEUROLOGY

## 2022-03-21 ENCOUNTER — TELEPHONE (OUTPATIENT)
Dept: FAMILY MEDICINE CLINIC | Facility: CLINIC | Age: 72
End: 2022-03-21

## 2022-03-21 DIAGNOSIS — T14.8XXA PULLED MUSCLE: Primary | ICD-10-CM

## 2022-03-21 DIAGNOSIS — M79.606 PAIN OF LOWER EXTREMITY, UNSPECIFIED LATERALITY: ICD-10-CM

## 2022-03-21 RX ORDER — ORAL SEMAGLUTIDE 3 MG/1
3 TABLET ORAL DAILY
Qty: 30 TABLET | Refills: 6 | Status: SHIPPED | OUTPATIENT
Start: 2022-03-21 | End: 2022-04-20

## 2022-03-21 NOTE — TELEPHONE ENCOUNTER
Caller: Lori Sutton SAMANTHA    Relationship: Self    Best call back number [phone]    What medication are you requesting: JYOTI      PATIENT WAS GIVEN SAMPLES ON 3/2/22 AND WOULD LIKE A PRESCRIPTION OF LOWEST DOSE SENT TO PHARMACY.    If a prescription is needed, what is your preferred pharmacy and phone number: [facility] - [facility] [address], [address] - [phone] ph - [phone] FX     Additional notes:

## 2022-03-23 RX ORDER — BUDESONIDE AND FORMOTEROL FUMARATE DIHYDRATE 80; 4.5 UG/1; UG/1
2 AEROSOL RESPIRATORY (INHALATION)
Qty: 10.2 G | Refills: 3 | Status: SHIPPED | OUTPATIENT
Start: 2022-03-23 | End: 2022-11-09

## 2022-04-05 RX ORDER — LEVOTHYROXINE SODIUM 0.03 MG/1
TABLET ORAL
Qty: 90 TABLET | Refills: 3 | Status: SHIPPED | OUTPATIENT
Start: 2022-04-05 | End: 2023-03-03

## 2022-04-18 ENCOUNTER — TREATMENT (OUTPATIENT)
Dept: PHYSICAL THERAPY | Facility: CLINIC | Age: 72
End: 2022-04-18

## 2022-04-18 DIAGNOSIS — T14.8XXA PULLED MUSCLE: Primary | ICD-10-CM

## 2022-04-18 DIAGNOSIS — M79.604 PAIN OF RIGHT LOWER EXTREMITY: ICD-10-CM

## 2022-04-18 PROCEDURE — 97530 THERAPEUTIC ACTIVITIES: CPT | Performed by: PHYSICAL THERAPIST

## 2022-04-18 PROCEDURE — 97162 PT EVAL MOD COMPLEX 30 MIN: CPT | Performed by: PHYSICAL THERAPIST

## 2022-04-18 NOTE — PROGRESS NOTES
Physical Therapy Initial Evaluation and Plan of Care    Patient: Lori Sutton   : 1950  Diagnosis/ICD-10 Code:  Pulled muscle [T14.8XXA]  Referring practitioner: Ayaz Luna MD  Date of Initial Visit: 2022  Today's Date: 2022  Patient seen for 1 sessions         Visit Diagnoses:    ICD-10-CM ICD-9-CM   1. Pulled muscle  T14.8XXA 848.9   2. Pain of right lower extremity  M79.604 729.5       Subjective Questionnaire: LEFS: 26      Subjective Evaluation    History of Present Illness  Mechanism of injury: Patient reports that she feels like she has less mobility that has gotten worse over time. Patient reports she has not been as active since her  passed away. She has history of lower back pain. A couple of months ago, she was getting out of her car and started having pain in the right leg from the hip to the knee. She reports it has gotten better but still continues to have pain. She has had a lot of weakness in the right leg.    Patient has history of 2-3 falls within the past year. The latest fall she was picking something up from the floor and bent over. She continued to fall forward. She hit her head on the cabinet but no injuries.    Walking for long periods of time, driving for long periods of time, getting into/out of the car, ascending/descending stairs, stepping down/up from a curb, standing for long periods of time, and shopping without a shopping cart tends to aggravate symptoms into the lower back and leg. Patient has not tried a lot of treatment for the back or leg. Has taken acetaminophen which helps.     Patient reports that she has complete numbness in the right hand and numbness starting in the left hand. Patient had an EMG which was normal.    Patient reports history of COPD.       Patient Occupation: Retired Quality of life: fair    Pain  Current pain ratin  At best pain ratin  At worst pain ratin  Quality: sharp and dull ache  Relieving factors:  medications  Aggravating factors: ambulation, squatting, lifting, stairs, prolonged positioning, movement and standing  Progression: improved    Social Support  Lives in: one-story house  Lives with: alone    Patient Goals  Patient goals for therapy: decreased pain, improved balance, increased motion, increased strength, independence with ADLs/IADLs and return to sport/leisure activities             Objective          Palpation   Left   Hypertonic in the erector spinae.     Right   Hypertonic in the erector spinae, gluteus shakir, gluteus medius, lumbar paraspinals, piriformis, rectus femoris and TFL. Tenderness of the gluteus medius, lumbar paraspinals and piriformis.   Trigger point to piriformis.     Tenderness     Right Hip   Tenderness in the PSIS.     Passive Range of Motion     Right Hip   Flexion: WFL  Extension: 0 degrees   Internal rotation (90/90): WFL and with pain    Joint Play     Right Hip     Hypomobile in the posterior hip capsule and long axis distraction    Strength/Myotome Testing     Left Hip   Planes of Motion   Flexion: 4+  Abduction: 4-  Adduction: 4-    Right Hip   Planes of Motion   Flexion: 4  Abduction: 4-  Adduction: 4-    Left Knee   Flexion: 5  Extension: 5    Right Knee   Flexion: 4+  Extension: 4+    Left Ankle/Foot   Dorsiflexion: 5    Right Ankle/Foot   Dorsiflexion: 4+    Ambulation     Quality of Movement During Gait   Trunk    Trunk (Right): Positive lateral lean over stance limb.     Pelvis    Pelvis (Left): Positive Trendelenburg.   Pelvis (Right): Positive Trendelenburg.           Assessment & Plan     Assessment  Impairments: abnormal coordination, abnormal gait, abnormal muscle firing, abnormal muscle tone, abnormal or restricted ROM, activity intolerance, impaired balance, impaired physical strength, lacks appropriate home exercise program, pain with function and safety issue  Functional Limitations: carrying objects, lifting, walking, pushing, uncomfortable because of  pain, sitting, standing and stooping  Assessment details: The patient is a 72 y.o. female who presents to physical therapy today with orders to address right LE pain and weakness. Upon initial evaluation, the patient demonstrates the following impairments: increased pain, increased muscle tone, abnormal gait, decreased strength, decreased joint mobility, and decreased ROM. Due to these impairments, the patient is unable to stand or walk for long periods of time, ascend/descend curbs, and perform household duties without an increase in symptoms. The patient would benefit from skilled PT services to address functional limitations and impairments and to improve patient quality of life.      Prognosis: good    Goals  Plan Goals: STG's: 2 weeks   Patient will report pain level at worse </= 6/10 for improved tolerance to ADLs and functional mobility  Patient will require <3 tactile or verbal cues for proper mechanics during completion of her HEP      LTG's: By Discharge  Patient will report pain levels at worst </= 4/10 for improved tolerance to ADLs and functional mobility  Patient will be able to ambulate unlimited distances without an assistive device and no increased pain  Patient will be able to complete single leg stance on stable surface with no UE support, with supervision for durations > 30 seconds on RLE to decrease risk of falls  Patient will report improved levels of overall function as demonstrated by an increase in her reported level of function score on the LEFS to >/= 40/80    Patient will report improvement in their tolerance to sleeping and report waking up </= 1x/night per positional discomfort  Patient will require no tactile or verbal cues for proper mechanics during completion of her HEP for final independence       Plan  Therapy options: will be seen for skilled therapy services  Planned modality interventions: cryotherapy, electrical stimulation/Russian stimulation, TENS and thermotherapy  (hydrocollator packs)  Planned therapy interventions: ADL retraining, balance/weight-bearing training, flexibility, functional ROM exercises, gait training, home exercise program, IADL retraining, joint mobilization, manual therapy, neuromuscular re-education, soft tissue mobilization, spinal/joint mobilization, strengthening, stretching and therapeutic activities  Frequency: 2x week  Duration in visits: 16  Duration in weeks: 8  Treatment plan discussed with: patient        History # of Personal Factors and/or Comorbidities: MODERATE (1-2)  Examination of Body System(s): # of elements: MODERATE (3)  Clinical Presentation: EVOLVING  Clinical Decision Making: MODERATE      Timed:         Manual Therapy:         mins  50294;     Therapeutic Exercise:    8     mins  21527;     Neuromuscular Chetan:        mins  82240;    Therapeutic Activity:     8     mins  45372;     Gait Training:           mins  15408;     Ultrasound:          mins  39503;    Ionto                                   mins   62227  Self Care                            mins   91699  Canalith Repos         mins 18911      Un-Timed:  Electrical Stimulation:         mins  42872 (MC );  Dry Needling          mins self-pay  Traction          mins 33183  Low Eval          Mins  58027  Mod Eval     24     Mins  77111  High Eval                            Mins  26885        Timed Treatment:   16   mins   Total Treatment:     40   mins    PT SIGNATURE: Carmen Moses PT   [place] License # [id number]  DATE TREATMENT INITIATED: 7/27/2022    Medicare Initial Certification  Certification Period: 7/27/2022 thru 10/24/2022  I certify that the therapy services are furnished while this patient is under my care.  The services outlined above are required by this patient, and will be reviewed every 90 days.  Physician Signature: ________________________________________________________________________   PHYSICIAN: Ayaz Luna MD      DATE:  _________________    Please sign and return via fax to [phone].. Thank you, [facility].

## 2022-05-02 ENCOUNTER — TREATMENT (OUTPATIENT)
Dept: PHYSICAL THERAPY | Facility: CLINIC | Age: 72
End: 2022-05-02

## 2022-05-02 DIAGNOSIS — T14.8XXA PULLED MUSCLE: Primary | ICD-10-CM

## 2022-05-02 DIAGNOSIS — M79.604 PAIN OF RIGHT LOWER EXTREMITY: ICD-10-CM

## 2022-05-02 PROCEDURE — 97140 MANUAL THERAPY 1/> REGIONS: CPT | Performed by: PHYSICAL THERAPIST

## 2022-05-02 PROCEDURE — 97110 THERAPEUTIC EXERCISES: CPT | Performed by: PHYSICAL THERAPIST

## 2022-05-02 NOTE — PROGRESS NOTES
Physical Therapy Daily Progress Note    Patient: Lori Sutton  : 1950  Referring Practitioner: Ayaz Luna MD  Date of Initial Visit: No linked episodes  Today's Date: 2022  Patient seen for 2 sessions    Visit Diagnoses:     ICD-10-CM ICD-9-CM   1. Pulled muscle  T14.8XXA 848.9   2. Pain of right lower extremity  M79.604 729.5       Subjective   Lori Sutton reports that her right hip/leg area. Patient reports 5/10 pain, she is aware of the pain but it isn't stabbing.   Pain Ratin    Objective     See Exercise, Manual, and Modality Logs for complete treatment.     Patient Education: exercise and treatment rationale    Assessment & Plan     Assessment    Assessment details: The patient tolerated manual therapy to her R glutes, piriformis, and lumbar paraspinals well today with a slight reduction in tone following. Patient tolerated additional exercises for hip strength and stability well today with no increase in symptoms.        Progress per Plan of Care and Progress strengthening /stabilization /functional activity          Timed:         Manual Therapy:    23     mins  82574;     Therapeutic Exercise:    17     mins  29163;     Neuromuscular Chetan:        mins  58470;    Therapeutic Activity:          mins  54711;     Gait Training:           mins  83004;     Ultrasound:          mins  91263;    Ionto                                   mins   35114  Self Care                            mins   89293  Canalith Repos                   mins  08853    Un-Timed:  Electrical Stimulation:         mins  51824 ( );  Dry Needling          mins   Traction          mins 05749    Timed Treatment:   40   mins   Total Treatment:     40   mins      Carmen Moses PT  Physical Therapist  [place] License # [id number]

## 2022-05-05 ENCOUNTER — TREATMENT (OUTPATIENT)
Dept: PHYSICAL THERAPY | Facility: CLINIC | Age: 72
End: 2022-05-05

## 2022-05-05 DIAGNOSIS — M79.604 PAIN OF RIGHT LOWER EXTREMITY: ICD-10-CM

## 2022-05-05 DIAGNOSIS — T14.8XXA PULLED MUSCLE: Primary | ICD-10-CM

## 2022-05-05 PROCEDURE — 97140 MANUAL THERAPY 1/> REGIONS: CPT | Performed by: PHYSICAL THERAPIST

## 2022-05-05 PROCEDURE — 97110 THERAPEUTIC EXERCISES: CPT | Performed by: PHYSICAL THERAPIST

## 2022-05-05 PROCEDURE — 97530 THERAPEUTIC ACTIVITIES: CPT | Performed by: PHYSICAL THERAPIST

## 2022-05-05 NOTE — PROGRESS NOTES
Physical Therapy Daily Progress Note    Patient: Lori Sutton  : 1950  Referring Practitioner: Ayaz Luna MD  Date of Initial Visit: No linked episodes  Today's Date: 2022  Patient seen for 3 sessions    Visit Diagnoses:     ICD-10-CM ICD-9-CM   1. Pulled muscle  T14.8XXA 848.9   2. Pain of right lower extremity  M79.604 729.5       Subjective   Lori Sutton reports that the front of her thighs are sore from going up and down the stairs a lot. She is having some residual pain in the right hip, she had a sharper pain getting into the car.  Pain Ratin    Objective     See Exercise, Manual, and Modality Logs for complete treatment.     Patient Education: exercise cueing    Assessment & Plan     Assessment    Assessment details: The patient demonstrated even leg length, did demonstrate hypomobility with LAT in the R hip which responded well to treatment. Patient ended session with heat, reports a good response with treatment.        Progress per Plan of Care and Progress strengthening /stabilization /functional activity    Timed:         Manual Therapy:    18     mins  06632;     Therapeutic Exercise:    10     mins  08273;     Neuromuscular Chetan:        mins  34252;    Therapeutic Activity:     10     mins  60142;     Gait Training:           mins  93443;     Ultrasound:          mins  93932;    Ionto                                   mins   58245  Self Care                            mins   43852  Canalith Repos                   mins  87096    Un-Timed:  Electrical Stimulation:         mins  39520 ( );  Dry Needling          mins   Traction          mins 35701    Timed Treatment:   38   mins   Total Treatment:     48   mins      Carmen Moses PT  Physical Therapist  [place] License # [id number]

## 2022-05-09 ENCOUNTER — TREATMENT (OUTPATIENT)
Dept: PHYSICAL THERAPY | Facility: CLINIC | Age: 72
End: 2022-05-09

## 2022-05-09 DIAGNOSIS — T14.8XXA PULLED MUSCLE: Primary | ICD-10-CM

## 2022-05-09 DIAGNOSIS — M79.604 PAIN OF RIGHT LOWER EXTREMITY: ICD-10-CM

## 2022-05-09 PROCEDURE — 97110 THERAPEUTIC EXERCISES: CPT | Performed by: PHYSICAL THERAPIST

## 2022-05-09 PROCEDURE — 97140 MANUAL THERAPY 1/> REGIONS: CPT | Performed by: PHYSICAL THERAPIST

## 2022-05-09 PROCEDURE — 97530 THERAPEUTIC ACTIVITIES: CPT | Performed by: PHYSICAL THERAPIST

## 2022-05-09 NOTE — PROGRESS NOTES
Physical Therapy Daily Progress Note    Patient: Lori Sutton  : 1950  Referring Practitioner: Ayaz Luna MD  Date of Initial Visit: No linked episodes  Today's Date: 2022  Patient seen for 4 sessions    Visit Diagnoses:     ICD-10-CM ICD-9-CM   1. Pulled muscle  T14.8XXA 848.9   2. Pain of right lower extremity  M79.604 729.5       Subjective   Lori Sutton reports that she had some achiness in the leg this morning but has since gone away. When she is resting she doesn't seem to have much pain.  Pain Rating: reports not a lot of pain at rest    Objective     See Exercise, Manual, and Modality Logs for complete treatment.     Patient Education: treatment and exercise rationale    Assessment & Plan     Assessment    Assessment details: The patient was able to perform level 2 step up with LLE, however, had difficulty on RLE. Was able to complete level 1 step up with RLE without any increase in pain. Patient tolerated standing exercises well today with no increase in symptoms, did note muscle fatigue following.        Progress per Plan of Care and Progress strengthening /stabilization /functional activity          Timed:         Manual Therapy:    18     mins  27759;     Therapeutic Exercise:    12     mins  96863;     Neuromuscular Chetan:        mins  02722;    Therapeutic Activity:     8     mins  28272;     Gait Training:           mins  79408;     Ultrasound:          mins  25022;    Ionto                                   mins   32466  Self Care                            mins   70565  Canalith Repos                   mins  16810    Un-Timed:  Electrical Stimulation:         mins  27039 (MC );  Dry Needling          mins   Traction          mins 95206    Timed Treatment:   38   mins   Total Treatment:     48   mins      Carmen Moses PT  Physical Therapist  [place] License # [id number]

## 2022-05-12 ENCOUNTER — TREATMENT (OUTPATIENT)
Dept: PHYSICAL THERAPY | Facility: CLINIC | Age: 72
End: 2022-05-12

## 2022-05-12 DIAGNOSIS — M79.604 PAIN OF RIGHT LOWER EXTREMITY: ICD-10-CM

## 2022-05-12 DIAGNOSIS — T14.8XXA PULLED MUSCLE: Primary | ICD-10-CM

## 2022-05-12 PROCEDURE — 97140 MANUAL THERAPY 1/> REGIONS: CPT | Performed by: PHYSICAL THERAPIST

## 2022-05-12 PROCEDURE — 97110 THERAPEUTIC EXERCISES: CPT | Performed by: PHYSICAL THERAPIST

## 2022-05-12 PROCEDURE — 97530 THERAPEUTIC ACTIVITIES: CPT | Performed by: PHYSICAL THERAPIST

## 2022-05-12 NOTE — PROGRESS NOTES
Physical Therapy Daily Progress Note    Patient: Lori Sutton  : 1950  Referring Practitioner: Ayaz Luna MD  Date of Initial Visit: No linked episodes  Today's Date: 2022  Patient seen for 5 sessions    Visit Diagnoses:     ICD-10-CM ICD-9-CM   1. Pulled muscle  T14.8XXA 848.9   2. Pain of right lower extremity  M79.604 729.5       Subjective   Lori Sutton reports that her leg is better, she can almost lift it by herself. Pain isn't terrible, it is just there.  Pain Ratin    Objective     See Exercise, Manual, and Modality Logs for complete treatment.     Patient Education: exercise cueing and rationale    Assessment & Plan     Assessment    Assessment details: Patient tolerated exercise progression well today, was able to complete step ups with right leg on level 2 compared to level 1 at previous visit. Patient did note some discomfort in the lumbar spine with standing exercises, it decreased with increasing reps.        Progress per Plan of Care and Progress strengthening /stabilization /functional activity          Timed:         Manual Therapy:    15     mins  36046;     Therapeutic Exercise:    15     mins  21942;     Neuromuscular Chetan:        mins  69280;    Therapeutic Activity:     10     mins  84501;     Gait Training:           mins  91665;     Ultrasound:          mins  72069;    Ionto                                   mins   68807  Self Care                            mins   14871  Canalith Repos                   mins  88365    Un-Timed:  Electrical Stimulation:         mins  10497 ( );  Dry Needling          mins   Traction          mins 65815    Timed Treatment:   40   mins   Total Treatment:     50   mins      Carmen Moses PT  Physical Therapist  [place] License # [id number]

## 2022-05-16 ENCOUNTER — TREATMENT (OUTPATIENT)
Dept: PHYSICAL THERAPY | Facility: CLINIC | Age: 72
End: 2022-05-16

## 2022-05-16 DIAGNOSIS — M79.606 PAIN OF LOWER EXTREMITY, UNSPECIFIED LATERALITY: ICD-10-CM

## 2022-05-16 DIAGNOSIS — T14.8XXA PULLED MUSCLE: Primary | ICD-10-CM

## 2022-05-16 PROCEDURE — 97140 MANUAL THERAPY 1/> REGIONS: CPT | Performed by: PHYSICAL THERAPIST

## 2022-05-16 PROCEDURE — 97110 THERAPEUTIC EXERCISES: CPT | Performed by: PHYSICAL THERAPIST

## 2022-05-16 NOTE — PROGRESS NOTES
Physical Therapy Daily Progress Note    Patient: Lori Sutton  : 1950  Referring Practitioner: Ayaz Luna MD  Date of Initial Visit: Type: THERAPY  Noted: 2022  Today's Date: 2022  Patient seen for 6 sessions    Visit Diagnoses:     ICD-10-CM ICD-9-CM   1. Pulled muscle  T14.8XXA 848.9   2. Pain of lower extremity, unspecified laterality  M79.606 729.5       Subjective   Lori Sutton reports that she doesn't have the severe pain in the help anymore but doesn't do anything to cause the pain. She is able to step up on the curb a little easier.  Pain Ratin  LEFS:   Objective     See Exercise, Manual, and Modality Logs for complete treatment.     Patient Education: exercise cueing    Assessment & Plan     Assessment    Assessment details: The patient has been seen for 6 visits of PT at this time, is doing well with reduction of severe pain in the right LE. Patient notices she is able to step up onto a curb more easily. However, she continues to have pain in the RLE with walking and standing for long periods of time. Would continue to benefit from skilled PT to address remaining functional limitations and impairments.     Goals  Plan Goals: STG's: 2 weeks   Patient will report pain level at worse </= 6/10 for improved tolerance to ADLs and functional mobility-MET  Patient will require <3 tactile or verbal cues for proper mechanics during completion of her HEP-MET     LTG's: By Discharge  Patient will report pain levels at worst </= 4/10 for improved tolerance to ADLs and functional mobility-NOT MET  Patient will be able to ambulate unlimited distances without an assistive device and no increased pain-NOT MET  Patient will be able to complete single leg stance on stable surface with no UE support, with supervision for durations > 30 seconds on RLE to decrease risk of falls-NOT MET  Patient will report improved levels of overall function as demonstrated by an increase in her reported  level of function score on the LEFS to >/= 40/80-NOT MET Patient will report improvement in their tolerance to sleeping and report waking up </= 1x/night per positional discomfort-NOT MET  Patient will require no tactile or verbal cues for proper mechanics during completion of her HEP for final independence-NOT MET        Progress per Plan of Care and Progress strengthening /stabilization /functional activity          Timed:         Manual Therapy:    15     mins  44643;     Therapeutic Exercise:    23     mins  05393;     Neuromuscular Chetan:        mins  50066;    Therapeutic Activity:     2     mins  42599;     Gait Training:           mins  88261;     Ultrasound:          mins  41609;    Ionto                                   mins   22865  Self Care                            mins   27493  Canalith Repos                   mins  79879    Un-Timed:  Electrical Stimulation:         mins  23338 ( );  Dry Needling          mins   Traction          mins 86430    Timed Treatment:   40   mins   Total Treatment:     40   mins      Carmen Moses PT  Physical Therapist  [place] License # [id number]

## 2022-05-19 ENCOUNTER — TREATMENT (OUTPATIENT)
Dept: PHYSICAL THERAPY | Facility: CLINIC | Age: 72
End: 2022-05-19

## 2022-05-19 DIAGNOSIS — M79.604 PAIN OF RIGHT LOWER EXTREMITY: ICD-10-CM

## 2022-05-19 DIAGNOSIS — T14.8XXA PULLED MUSCLE: Primary | ICD-10-CM

## 2022-05-19 PROCEDURE — 97110 THERAPEUTIC EXERCISES: CPT | Performed by: PHYSICAL THERAPIST

## 2022-05-19 PROCEDURE — 97140 MANUAL THERAPY 1/> REGIONS: CPT | Performed by: PHYSICAL THERAPIST

## 2022-05-19 PROCEDURE — 97530 THERAPEUTIC ACTIVITIES: CPT | Performed by: PHYSICAL THERAPIST

## 2022-05-19 NOTE — PROGRESS NOTES
Physical Therapy Daily Progress Note    Patient: Lori Sutton  : 1950  Referring Practitioner: Ayaz Luna MD  Date of Initial Visit: No linked episodes  Today's Date: 2022  Patient seen for 7 sessions    Visit Diagnoses:     ICD-10-CM ICD-9-CM   1. Pulled muscle  T14.8XXA 848.9   2. Pain of right lower extremity  M79.604 729.5       Subjective   Lori Sutton reports that her hip/leg was a little sore after the last therapy session, she believes it is due to the side leg kicks.  Pain Rating: 3    Objective     See Exercise, Manual, and Modality Logs for complete treatment.     Patient Education: exercise cueing    Assessment & Plan     Assessment    Assessment details: The patient demonstrated active TrPs in the R glute med and piriformis. Patient demonstrated muscle fatigue with hip stability exercises at today's visit. PT to continue progressing per patient tolerance. Patient advised to practice step ups at home in safe environment for hip strength.        Progress per Plan of Care and Progress strengthening /stabilization /functional activity          Timed:         Manual Therapy:    15     mins  61060;     Therapeutic Exercise:    17     mins  79813;     Neuromuscular Chetan:        mins  80436;    Therapeutic Activity:     8     mins  76865;     Gait Training:           mins  29278;     Ultrasound:          mins  99189;    Ionto                                   mins   91395  Self Care                            mins   00944  Canalith Repos                   mins  69918    Un-Timed:  Electrical Stimulation:         mins  82549 ( );  Dry Needling          mins   Traction          mins 03407    Timed Treatment:   40   mins   Total Treatment:     40   mins      Carmen Moses PT  Physical Therapist  [place] License # [id number]

## 2022-05-23 ENCOUNTER — TREATMENT (OUTPATIENT)
Dept: PHYSICAL THERAPY | Facility: CLINIC | Age: 72
End: 2022-05-23

## 2022-05-23 DIAGNOSIS — M79.604 PAIN OF RIGHT LOWER EXTREMITY: ICD-10-CM

## 2022-05-23 DIAGNOSIS — T14.8XXA PULLED MUSCLE: Primary | ICD-10-CM

## 2022-05-23 PROCEDURE — 97140 MANUAL THERAPY 1/> REGIONS: CPT | Performed by: PHYSICAL THERAPIST

## 2022-05-23 PROCEDURE — 97530 THERAPEUTIC ACTIVITIES: CPT | Performed by: PHYSICAL THERAPIST

## 2022-05-23 PROCEDURE — 97110 THERAPEUTIC EXERCISES: CPT | Performed by: PHYSICAL THERAPIST

## 2022-05-23 NOTE — PROGRESS NOTES
Physical Therapy Daily Progress Note    Patient: Lori Sutton  : 1950  Referring Practitioner: Ayaz Luna MD  Date of Initial Visit: No linked episodes  Today's Date: 2022  Patient seen for 8 sessions    Visit Diagnoses:     ICD-10-CM ICD-9-CM   1. Pulled muscle  T14.8XXA 848.9   2. Pain of right lower extremity  M79.604 729.5       Subjective   Lori Sutton reports that she feels she is getting stronger, she was able to walk up 4-5 steps with more ease.  Pain Rating: 3    Objective     See Exercise, Manual, and Modality Logs for complete treatment.     Patient Education: exercise cueing and rationale, treatment for muscle soreness    Assessment & Plan     Assessment    Assessment details: The patient presents to therapy today with tenderness along the R piriformis which responded well to MFR. Patient tolerated exercise progression and added exercises well today, did note muscle and generalized fatigue following session. Patient educated on use of stretching, massage, and heat for muscle soreness.        Progress per Plan of Care and Progress strengthening /stabilization /functional activity          Timed:         Manual Therapy:    15     mins  04988;     Therapeutic Exercise:    13     mins  82460;     Neuromuscular hCetan:        mins  90313;    Therapeutic Activity:     17     mins  55699;     Gait Training:           mins  30473;     Ultrasound:          mins  46754;    Ionto                                   mins   90807  Self Care                            mins   00528  Canalith Repos                   mins  00238    Un-Timed:  Electrical Stimulation:         mins  80271 ( );  Dry Needling          mins   Traction          mins 33564    Timed Treatment:   45   mins   Total Treatment:     45   mins      Carmen Moses PT  Physical Therapist  [place] License # [id number]

## 2022-05-26 ENCOUNTER — TREATMENT (OUTPATIENT)
Dept: PHYSICAL THERAPY | Facility: CLINIC | Age: 72
End: 2022-05-26

## 2022-05-26 DIAGNOSIS — T14.8XXA PULLED MUSCLE: Primary | ICD-10-CM

## 2022-05-26 DIAGNOSIS — M79.604 PAIN OF RIGHT LOWER EXTREMITY: ICD-10-CM

## 2022-05-26 PROCEDURE — 97140 MANUAL THERAPY 1/> REGIONS: CPT | Performed by: PHYSICAL THERAPIST

## 2022-05-26 PROCEDURE — 97530 THERAPEUTIC ACTIVITIES: CPT | Performed by: PHYSICAL THERAPIST

## 2022-05-26 PROCEDURE — 97110 THERAPEUTIC EXERCISES: CPT | Performed by: PHYSICAL THERAPIST

## 2022-05-26 NOTE — PROGRESS NOTES
Physical Therapy Daily Progress Note    Patient: Lori Sutton  : 1950  Referring Practitioner: Ayaz Luna MD  Date of Initial Visit: No linked episodes  Today's Date: 2022  Patient seen for 9 sessions    Visit Diagnoses:     ICD-10-CM ICD-9-CM   1. Pulled muscle  T14.8XXA 848.9   2. Pain of right lower extremity  M79.604 729.5       Subjective   Lori Sutton reports that she had some soreness after the last therapy session. She's not as sore as yesterday.  Pain Ratin    Objective     See Exercise, Manual, and Modality Logs for complete treatment.     Patient Education: exercise cueing    Assessment & Plan     Assessment    Assessment details: The patient presents to therapy today with tenderness along the R glute med, responded well to MFR. Patient demonstrated improvement in step ups, demonstrated a reduction in Trendelenburg with R step ups compared to previous visits.         Progress per Plan of Care and Progress strengthening /stabilization /functional activity    Timed:         Manual Therapy:    15     mins  40389;     Therapeutic Exercise:    12     mins  11690;     Neuromuscular Chetan:        mins  72787;    Therapeutic Activity:     15     mins  63580;     Gait Training:           mins  59336;     Ultrasound:          mins  93547;    Ionto                                   mins   47094  Self Care                            mins   86176  Canalith Repos                   mins  45567    Un-Timed:  Electrical Stimulation:         mins  78672 ( );  Dry Needling          mins   Traction          mins 73735    Timed Treatment:   42   mins   Total Treatment:     42   mins      Carmen Moses PT  Physical Therapist  [place] License # [id number]

## 2022-06-07 ENCOUNTER — TREATMENT (OUTPATIENT)
Dept: PHYSICAL THERAPY | Facility: CLINIC | Age: 72
End: 2022-06-07

## 2022-06-07 DIAGNOSIS — T14.8XXA PULLED MUSCLE: Primary | ICD-10-CM

## 2022-06-07 DIAGNOSIS — M79.604 PAIN OF RIGHT LOWER EXTREMITY: ICD-10-CM

## 2022-06-07 PROCEDURE — 97140 MANUAL THERAPY 1/> REGIONS: CPT | Performed by: PHYSICAL THERAPIST

## 2022-06-07 PROCEDURE — 97110 THERAPEUTIC EXERCISES: CPT | Performed by: PHYSICAL THERAPIST

## 2022-06-07 PROCEDURE — 97530 THERAPEUTIC ACTIVITIES: CPT | Performed by: PHYSICAL THERAPIST

## 2022-06-14 ENCOUNTER — TREATMENT (OUTPATIENT)
Dept: PHYSICAL THERAPY | Facility: CLINIC | Age: 72
End: 2022-06-14

## 2022-06-14 DIAGNOSIS — T14.8XXA PULLED MUSCLE: Primary | ICD-10-CM

## 2022-06-14 DIAGNOSIS — M79.604 PAIN OF RIGHT LOWER EXTREMITY: ICD-10-CM

## 2022-06-14 PROCEDURE — 97530 THERAPEUTIC ACTIVITIES: CPT | Performed by: PHYSICAL THERAPIST

## 2022-06-14 PROCEDURE — 97140 MANUAL THERAPY 1/> REGIONS: CPT | Performed by: PHYSICAL THERAPIST

## 2022-06-14 PROCEDURE — 97110 THERAPEUTIC EXERCISES: CPT | Performed by: PHYSICAL THERAPIST

## 2022-06-14 NOTE — PROGRESS NOTES
Physical Therapy Daily Progress Note    Patient: Lori Sutton  : 1950  Referring Practitioner: Ayaz Luna MD  Date of Initial Visit: No linked episodes  Today's Date: 2022  Patient seen for 11 sessions    Visit Diagnoses:     ICD-10-CM ICD-9-CM   1. Pulled muscle  T14.8XXA 848.9   2. Pain of right lower extremity  M79.604 729.5       Subjective   Lori Sutton reports that her hip and back are about the same, feels stronger. She is able to ascend/descend stairs easier.  Pain Ratin  LEFS: 33 (previously 31 and 26)  Objective     See Exercise, Manual, and Modality Logs for complete treatment.     Patient Education: exercise cueing    Assessment & Plan     Assessment    Assessment details: The patient has been seen for 11 visits of PT at this time, is doing well with reduction of severe pain in the right LE. Patient notices she is able to step up onto a curb more easily and is able to ascend stairs. However, she continues to have pain in the RLE with walking and standing for long periods of time. Would continue to benefit from skilled PT to address remaining functional limitations and impairments.     Goals  Plan Goals: STG's: 2 weeks   Patient will report pain level at worse </= 6/10 for improved tolerance to ADLs and functional mobility-MET  Patient will require <3 tactile or verbal cues for proper mechanics during completion of her HEP-MET     LTG's: By Discharge  Patient will report pain levels at worst </= 4/10 for improved tolerance to ADLs and functional mobility-NOT MET  Patient will be able to ambulate unlimited distances without an assistive device and no increased pain-NOT MET  Patient will be able to complete single leg stance on stable surface with no UE support, with supervision for durations > 30 seconds on RLE to decrease risk of falls-NOT MET  Patient will report improved levels of overall function as demonstrated by an increase in her reported level of function score on the  LEFS to >/= 40/80-NOT MET Patient will report improvement in their tolerance to sleeping and report waking up </= 1x/night per positional discomfort-MET  Patient will require no tactile or verbal cues for proper mechanics during completion of her HEP for final independence-NOT MET        Progress per Plan of Care and Progress strengthening /stabilization /functional activity          Timed:         Manual Therapy:    15     mins  90130;     Therapeutic Exercise:    15     mins  32604;     Neuromuscular Chetan:        mins  25005;    Therapeutic Activity:     12     mins  77840;     Gait Training:           mins  02153;     Ultrasound:          mins  13223;    Ionto                                   mins   24177  Self Care                            mins   52474  Canalith Repos                   mins  08843    Un-Timed:  Electrical Stimulation:         mins  14713 ( );  Dry Needling          mins   Traction          mins 84591    Timed Treatment:   42   mins   Total Treatment:     42   mins      Carmen Moses PT  Physical Therapist  [place] License # [id number]

## 2022-06-22 ENCOUNTER — TREATMENT (OUTPATIENT)
Dept: PHYSICAL THERAPY | Facility: CLINIC | Age: 72
End: 2022-06-22

## 2022-06-22 DIAGNOSIS — T14.8XXA PULLED MUSCLE: Primary | ICD-10-CM

## 2022-06-22 DIAGNOSIS — M79.604 PAIN OF RIGHT LOWER EXTREMITY: ICD-10-CM

## 2022-06-22 PROCEDURE — 97110 THERAPEUTIC EXERCISES: CPT | Performed by: PHYSICAL THERAPIST

## 2022-06-22 PROCEDURE — 97530 THERAPEUTIC ACTIVITIES: CPT | Performed by: PHYSICAL THERAPIST

## 2022-06-22 PROCEDURE — 97140 MANUAL THERAPY 1/> REGIONS: CPT | Performed by: PHYSICAL THERAPIST

## 2022-06-22 NOTE — PROGRESS NOTES
Physical Therapy Daily Treatment Note    Patient: Lori Sutton  : 1950  Referring Practitioner: Ayaz Luna MD  Date of Initial Visit: No linked episodes  Today's Date: 2022  Patient seen for 12 sessions    Visit Diagnoses:     ICD-10-CM ICD-9-CM   1. Pulled muscle  T14.8XXA 848.9   2. Pain of right lower extremity  M79.604 729.5       Subjective   Lori Sutton reports that her hip and back are doing okay, she has a little soreness in the right hip. She rode in the car for 5 hours back from [place].  Pain Ratin    Objective     See Exercise, Manual, and Modality Logs for complete treatment.     Patient Education: POC, objective progress    Assessment & Plan     Assessment    Assessment details: The patient presents to therapy today with slight pain in the right hip which responded well to MFR. Patient tolerated exercises well, did require use of one UE on step ups on level 1.        Progress per Plan of Care and Progress strengthening /stabilization /functional activity          Timed:         Manual Therapy:    15     mins  07891;     Therapeutic Exercise:    18     mins  86364;     Neuromuscular Chetan:        mins  49012;    Therapeutic Activity:     11     mins  22230;     Gait Training:           mins  73921;     Ultrasound:          mins  10041;    Ionto                                   mins   73126  Self Care                            mins   08043  Canalith Repos                   mins  62217    Un-Timed:  Electrical Stimulation:         mins  10329 ( );  Dry Needling          mins   Traction          mins 86666    Timed Treatment:  44    mins   Total Treatment:     44   mins      Carmen Moses PT  Physical Therapist  [place] License # [id number]

## 2022-06-24 ENCOUNTER — TREATMENT (OUTPATIENT)
Dept: PHYSICAL THERAPY | Facility: CLINIC | Age: 72
End: 2022-06-24

## 2022-06-24 DIAGNOSIS — M79.604 PAIN OF RIGHT LOWER EXTREMITY: ICD-10-CM

## 2022-06-24 DIAGNOSIS — T14.8XXA PULLED MUSCLE: Primary | ICD-10-CM

## 2022-06-24 PROCEDURE — 97530 THERAPEUTIC ACTIVITIES: CPT | Performed by: PHYSICAL THERAPIST

## 2022-06-24 PROCEDURE — 97110 THERAPEUTIC EXERCISES: CPT | Performed by: PHYSICAL THERAPIST

## 2022-06-24 NOTE — PROGRESS NOTES
Physical Therapy Daily Treatment Note    Patient: Lori Sutton  : 1950  Referring Practitioner: Ayaz Luna MD  Date of Initial Visit: No linked episodes  Today's Date: 2022  Patient seen for 13 sessions    Visit Diagnoses:     ICD-10-CM ICD-9-CM   1. Pulled muscle  T14.8XXA 848.9   2. Pain of right lower extremity  M79.604 729.5       Subjective   Lori Sutton reports that she was a little more sore yesterday from increasing the weight with exercises. She states she doesn't need manual therapy today, she feels good.   Pain Ratin    Objective     See Exercise, Manual, and Modality Logs for complete treatment.     Patient Education: exercise cueing and progression    Assessment & Plan     Assessment    Assessment details: The patient presents to therapy today with improved gait pattern and decreased pain in lumbar spine and hip. She tolerated treatment and exercises well today, deferred manual due to feeling well.         Progress per Plan of Care and Progress strengthening /stabilization /functional activity          Timed:         Manual Therapy:         mins  42915;     Therapeutic Exercise:    12     mins  40976;     Neuromuscular Chetan:        mins  97091;    Therapeutic Activity:     15     mins  21508;     Gait Training:           mins  86633;     Ultrasound:          mins  91488;    Ionto                                   mins   69646  Self Care                            mins   45053  Canalith Repos                   mins  23667    Un-Timed:  Electrical Stimulation:         mins  80331 ( );  Dry Needling          mins   Traction          mins 81876    Timed Treatment:   27   mins   Total Treatment:     27   mins      Carmen Moses PT  Physical Therapist  [place] License # [id number]

## 2022-06-27 ENCOUNTER — TELEPHONE (OUTPATIENT)
Dept: FAMILY MEDICINE CLINIC | Facility: CLINIC | Age: 72
End: 2022-06-27

## 2022-06-27 RX ORDER — DOXYCYCLINE HYCLATE 100 MG/1
100 CAPSULE ORAL 2 TIMES DAILY
Qty: 30 CAPSULE | Refills: 0 | Status: SHIPPED | OUTPATIENT
Start: 2022-06-27 | End: 2022-07-12

## 2022-06-27 NOTE — TELEPHONE ENCOUNTER
Caller: Lori Sutton    Relationship to patient: Self    Best call back number: [phone]    Patient is needing: PATIENT CALLED AND SAID SHE STARTED FEELING SICK Saturday MORNING, SHE WOKE UP VERY CONGESTED     SHE TOOK AN AT HOME COVID-19 TEST AND IT CAME BACK NEGATIVE    SHE SAID SHE IS ALSO HAVING A COUGH, RUNNY NOSE, AND JUST COLD SYMPTOMS IN GENERAL    SHE SAID SHE TOOK OVER THE COUNTER MUCINEX D BUT SHE SAID IT FEELS LIKE IT IS SETTLING IN HER LUNGS AND SHE IS CONCERNED IT WILL GO INTO BRONCHITIS     SHE IS WANTING TO SEE IF DR. QUEEN WANTS HER TO STAY ON MUCINEX D OR IF HE WANTS HER TO START AN ANTIBIOTIC     PHARMACY: [id number] - [place], [place] - [address] AT [address] - [phone] [facility] [phone]   [phone]

## 2022-07-01 ENCOUNTER — APPOINTMENT (OUTPATIENT)
Dept: NEUROLOGY | Facility: HOSPITAL | Age: 72
End: 2022-07-01

## 2022-07-11 ENCOUNTER — TREATMENT (OUTPATIENT)
Dept: PHYSICAL THERAPY | Facility: CLINIC | Age: 72
End: 2022-07-11

## 2022-07-11 DIAGNOSIS — T14.8XXA PULLED MUSCLE: Primary | ICD-10-CM

## 2022-07-11 DIAGNOSIS — M79.604 PAIN OF RIGHT LOWER EXTREMITY: ICD-10-CM

## 2022-07-11 PROCEDURE — 97110 THERAPEUTIC EXERCISES: CPT | Performed by: PHYSICAL THERAPIST

## 2022-07-11 PROCEDURE — 97530 THERAPEUTIC ACTIVITIES: CPT | Performed by: PHYSICAL THERAPIST

## 2022-07-11 NOTE — PROGRESS NOTES
Physical Therapy Daily Treatment Note/Discharge Summary    Patient: Lori Sutton  : 1950  Referring Practitioner: Ayaz Luna MD  Date of Initial Visit: No linked episodes  Today's Date: 2022  Patient seen for 14 sessions    Visit Diagnoses:     ICD-10-CM ICD-9-CM   1. Pulled muscle  T14.8XXA 848.9   2. Pain of right lower extremity  M79.604 729.5       Subjective   Lori Sutton reports that she has been having a cold for a while now and hasn't been as active as she has before.   Pain Ratin    Objective     See Exercise, Manual, and Modality Logs for complete treatment.     Patient Education: discharge status, discharge summary, HEP  [employer] exercise code: [id number]  Assessment & Plan     Assessment    Assessment details: The patient has made excellent progress in PT, making improvements in strength, ROM, balance, and pain reduction. She is now able to perform daily activities, ascend stairs, and walk for longer periods of time with little to no difficulty from the hip/low back. The patient is being discharged today due to meeting all therapy and personal goals. Patient was given an HEP and education for continued self care.    Goals  Plan Goals: STG's: 2 weeks   Patient will report pain level at worse </= 6/10 for improved tolerance to ADLs and functional mobility-MET  Patient will require <3 tactile or verbal cues for proper mechanics during completion of her HEP-MET     LTG's: By Discharge  Patient will report pain levels at worst </= 4/10 for improved tolerance to ADLs and functional mobility-MET  Patient will be able to ambulate unlimited distances without an assistive device and no increased pain-MET FOR HIP AND BACK  Patient will be able to complete single leg stance on stable surface with no UE support, with supervision for durations > 30 seconds on RLE to decrease risk of falls-MET  Patient will report improved levels of overall function as demonstrated by an increase in her  reported level of function score on the LEFS to >/= 40/80-MET Patient will report improvement in their tolerance to sleeping and report waking up </= 1x/night per positional discomfort-MET  Patient will require no tactile or verbal cues for proper mechanics during completion of her HEP for final independence-MET    Plan  Treatment plan discussed with: patient  Plan details: D/C with HEP        Other: D/C with HEP          Timed:         Manual Therapy:         mins  27683;     Therapeutic Exercise:    23     mins  54265;     Neuromuscular Chetan:        mins  27560;    Therapeutic Activity:     15     mins  96005;     Gait Training:           mins  68962;     Ultrasound:          mins  64018;    Ionto                                   mins   47588  Self Care                            mins   80367  Canalith Repos                   mins  76910    Un-Timed:  Electrical Stimulation:         mins  59244 ( );  Dry Needling          mins   Traction          mins 26904    Timed Treatment:   38   mins   Total Treatment:     38   mins      Carmen Moses PT  Physical Therapist  [place] License # [id number]

## 2022-08-05 RX ORDER — METOPROLOL SUCCINATE 100 MG/1
TABLET, EXTENDED RELEASE ORAL
Qty: 90 TABLET | Refills: 1 | Status: SHIPPED | OUTPATIENT
Start: 2022-08-05 | End: 2023-01-19

## 2022-08-12 RX ORDER — MONTELUKAST SODIUM 10 MG/1
TABLET ORAL
Qty: 90 TABLET | Refills: 3 | Status: SHIPPED | OUTPATIENT
Start: 2022-08-12

## 2022-08-12 RX ORDER — ALLOPURINOL 300 MG/1
TABLET ORAL
Qty: 90 TABLET | Refills: 3 | Status: SHIPPED | OUTPATIENT
Start: 2022-08-12

## 2022-09-06 RX ORDER — ATORVASTATIN CALCIUM 20 MG/1
TABLET, FILM COATED ORAL
Qty: 90 TABLET | Refills: 3 | Status: SHIPPED | OUTPATIENT
Start: 2022-09-06

## 2022-09-06 RX ORDER — CITALOPRAM 20 MG/1
TABLET ORAL
Qty: 90 TABLET | Refills: 3 | Status: SHIPPED | OUTPATIENT
Start: 2022-09-06

## 2022-09-07 ENCOUNTER — LAB (OUTPATIENT)
Dept: FAMILY MEDICINE CLINIC | Facility: CLINIC | Age: 72
End: 2022-09-07

## 2022-09-07 ENCOUNTER — OFFICE VISIT (OUTPATIENT)
Dept: FAMILY MEDICINE CLINIC | Facility: CLINIC | Age: 72
End: 2022-09-07

## 2022-09-07 VITALS
HEIGHT: 62 IN | RESPIRATION RATE: 16 BRPM | SYSTOLIC BLOOD PRESSURE: 126 MMHG | DIASTOLIC BLOOD PRESSURE: 80 MMHG | HEART RATE: 80 BPM | WEIGHT: 174 LBS | BODY MASS INDEX: 32.02 KG/M2 | OXYGEN SATURATION: 94 %

## 2022-09-07 DIAGNOSIS — Z00.00 MEDICARE ANNUAL WELLNESS VISIT, SUBSEQUENT: Primary | ICD-10-CM

## 2022-09-07 DIAGNOSIS — R60.0 EDEMA, LOWER EXTREMITY: ICD-10-CM

## 2022-09-07 DIAGNOSIS — E11.43 TYPE 2 DIABETES MELLITUS WITH DIABETIC AUTONOMIC NEUROPATHY, WITH LONG-TERM CURRENT USE OF INSULIN: ICD-10-CM

## 2022-09-07 DIAGNOSIS — R79.89 LOW SERUM SODIUM: ICD-10-CM

## 2022-09-07 DIAGNOSIS — Z12.31 ENCOUNTER FOR SCREENING MAMMOGRAM FOR MALIGNANT NEOPLASM OF BREAST: ICD-10-CM

## 2022-09-07 DIAGNOSIS — Z00.00 ANNUAL PHYSICAL EXAM: ICD-10-CM

## 2022-09-07 DIAGNOSIS — E55.9 VITAMIN D DEFICIENCY, UNSPECIFIED: ICD-10-CM

## 2022-09-07 DIAGNOSIS — I10 PRIMARY HYPERTENSION: ICD-10-CM

## 2022-09-07 DIAGNOSIS — E03.9 ACQUIRED HYPOTHYROIDISM: ICD-10-CM

## 2022-09-07 DIAGNOSIS — R32 URINARY INCONTINENCE, UNSPECIFIED TYPE: ICD-10-CM

## 2022-09-07 DIAGNOSIS — E78.2 MIXED HYPERLIPIDEMIA: ICD-10-CM

## 2022-09-07 DIAGNOSIS — R09.89 OTHER SPECIFIED SYMPTOMS AND SIGNS INVOLVING THE CIRCULATORY AND RESPIRATORY SYSTEMS: ICD-10-CM

## 2022-09-07 DIAGNOSIS — Z79.4 TYPE 2 DIABETES MELLITUS WITH DIABETIC AUTONOMIC NEUROPATHY, WITH LONG-TERM CURRENT USE OF INSULIN: ICD-10-CM

## 2022-09-07 LAB
25(OH)D3 SERPL-MCNC: 81.3 NG/ML (ref 30–100)
ALBUMIN SERPL-MCNC: 4.5 G/DL (ref 3.5–5.2)
ALBUMIN/GLOB SERPL: 2.1 G/DL
ALP SERPL-CCNC: 80 U/L (ref 39–117)
ALT SERPL W P-5'-P-CCNC: 16 U/L (ref 1–33)
ANION GAP SERPL CALCULATED.3IONS-SCNC: 13 MMOL/L (ref 5–15)
AST SERPL-CCNC: 24 U/L (ref 1–32)
BACTERIA UR QL AUTO: NORMAL /HPF
BILIRUB SERPL-MCNC: 0.8 MG/DL (ref 0–1.2)
BILIRUB UR QL STRIP: NEGATIVE
BUN SERPL-MCNC: 14 MG/DL (ref 8–23)
BUN/CREAT SERPL: 23 (ref 7–25)
CALCIUM SPEC-SCNC: 10.1 MG/DL (ref 8.6–10.5)
CHLORIDE SERPL-SCNC: 84 MMOL/L (ref 98–107)
CHOLEST SERPL-MCNC: 187 MG/DL (ref 0–200)
CLARITY UR: CLEAR
CO2 SERPL-SCNC: 32 MMOL/L (ref 22–29)
COLOR UR: YELLOW
CREAT SERPL-MCNC: 0.61 MG/DL (ref 0.57–1)
DEPRECATED RDW RBC AUTO: 45.1 FL (ref 37–54)
EGFRCR SERPLBLD CKD-EPI 2021: 93.9 ML/MIN/1.73
ERYTHROCYTE [DISTWIDTH] IN BLOOD BY AUTOMATED COUNT: 12 % (ref 12.3–15.4)
GLOBULIN UR ELPH-MCNC: 2.1 GM/DL
GLUCOSE SERPL-MCNC: 104 MG/DL (ref 65–99)
GLUCOSE UR STRIP-MCNC: NEGATIVE MG/DL
HBA1C MFR BLD: 5.6 % (ref 3.5–5.6)
HCT VFR BLD AUTO: 37.2 % (ref 34–46.6)
HDLC SERPL-MCNC: 80 MG/DL (ref 40–60)
HGB BLD-MCNC: 12.4 G/DL (ref 12–15.9)
HGB UR QL STRIP.AUTO: NEGATIVE
HYALINE CASTS UR QL AUTO: NORMAL /LPF
KETONES UR QL STRIP: NEGATIVE
LDLC SERPL CALC-MCNC: 86 MG/DL (ref 0–100)
LDLC/HDLC SERPL: 1.03 {RATIO}
LEUKOCYTE ESTERASE UR QL STRIP.AUTO: ABNORMAL
MCH RBC QN AUTO: 34.1 PG (ref 26.6–33)
MCHC RBC AUTO-ENTMCNC: 33.3 G/DL (ref 31.5–35.7)
MCV RBC AUTO: 102.2 FL (ref 79–97)
NITRITE UR QL STRIP: NEGATIVE
PH UR STRIP.AUTO: 7.5 [PH] (ref 5–8)
PLATELET # BLD AUTO: 238 10*3/MM3 (ref 140–450)
PMV BLD AUTO: 9.1 FL (ref 6–12)
POTASSIUM SERPL-SCNC: 3.5 MMOL/L (ref 3.5–5.2)
PROT SERPL-MCNC: 6.6 G/DL (ref 6–8.5)
PROT UR QL STRIP: NEGATIVE
RBC # BLD AUTO: 3.64 10*6/MM3 (ref 3.77–5.28)
RBC # UR STRIP: NORMAL /HPF
REF LAB TEST METHOD: NORMAL
SODIUM SERPL-SCNC: 129 MMOL/L (ref 136–145)
SP GR UR STRIP: 1.01 (ref 1–1.03)
SQUAMOUS #/AREA URNS HPF: NORMAL /HPF
TRIGL SERPL-MCNC: 124 MG/DL (ref 0–150)
TSH SERPL DL<=0.05 MIU/L-ACNC: 3.77 UIU/ML (ref 0.27–4.2)
UROBILINOGEN UR QL STRIP: ABNORMAL
VLDLC SERPL-MCNC: 21 MG/DL (ref 5–40)
WBC # UR STRIP: NORMAL /HPF
WBC NRBC COR # BLD: 6.25 10*3/MM3 (ref 3.4–10.8)

## 2022-09-07 PROCEDURE — 36415 COLL VENOUS BLD VENIPUNCTURE: CPT

## 2022-09-07 PROCEDURE — 83036 HEMOGLOBIN GLYCOSYLATED A1C: CPT | Performed by: NURSE PRACTITIONER

## 2022-09-07 PROCEDURE — G0439 PPPS, SUBSEQ VISIT: HCPCS | Performed by: NURSE PRACTITIONER

## 2022-09-07 PROCEDURE — 81001 URINALYSIS AUTO W/SCOPE: CPT | Performed by: NURSE PRACTITIONER

## 2022-09-07 PROCEDURE — 80053 COMPREHEN METABOLIC PANEL: CPT | Performed by: NURSE PRACTITIONER

## 2022-09-07 PROCEDURE — 82306 VITAMIN D 25 HYDROXY: CPT | Performed by: NURSE PRACTITIONER

## 2022-09-07 PROCEDURE — 1160F RVW MEDS BY RX/DR IN RCRD: CPT | Performed by: NURSE PRACTITIONER

## 2022-09-07 PROCEDURE — 82043 UR ALBUMIN QUANTITATIVE: CPT | Performed by: NURSE PRACTITIONER

## 2022-09-07 PROCEDURE — 80061 LIPID PANEL: CPT | Performed by: NURSE PRACTITIONER

## 2022-09-07 PROCEDURE — 85027 COMPLETE CBC AUTOMATED: CPT | Performed by: NURSE PRACTITIONER

## 2022-09-07 PROCEDURE — 1170F FXNL STATUS ASSESSED: CPT | Performed by: NURSE PRACTITIONER

## 2022-09-07 PROCEDURE — 84443 ASSAY THYROID STIM HORMONE: CPT | Performed by: NURSE PRACTITIONER

## 2022-09-07 NOTE — PROGRESS NOTES
The ABCs of the Annual Wellness Visit  Subsequent Medicare Wellness Visit    Chief Complaint   Patient presents with   • Medicare Wellness-subsequent      Subjective    History of Present Illness:  Lori Sutton is a 72 y.o. female who presents for a Subsequent Medicare Wellness Visit.    The following portions of the patient's history were reviewed and   updated as appropriate: allergies, current medications, past family history, past medical history, past social history, past surgical history and problem list.    Compared to one year ago, the patient feels her physical   health is better.    Compared to one year ago, the patient feels her mental   health is the same.    Recent Hospitalizations:  She was not admitted to the hospital during the last year.     Ms. Sutton is a patient of Dr. Luna and is previously unknown to me  She has  H/o HTN, T2DM, hyperlipidemia, IBS, depression, gout, arthritis, idiopathic neuropathy, reactive airway  Her current medicines are albuterol hfa, allopurinol, atorvastatin, symbicort, vit d, celexa, hydralazine, levothyroxine, metoprolol, singulair, omeprazole, valsartan-hctz, vitamin e    Last DM eye exam - 11/2021, has an appointment scheduled for 11/30/22  Cologuard 9/2021 was negative  Mammogram 10/2021 followed by b/l US which was negative, recommendation to continue annual screening  DEXA scan in 8/2021    BP today is well controlled    BP Readings from Last 3 Encounters:   09/07/22 126/80   03/16/22 140/88   03/02/22 122/70     Does not check Blood Sugars at home   She stopped taking the rybelsus about 1 month ago - caused her to feel nauseated and occasionally she would vomit    Endorses that she follows a keto diet to help manage her blood sugars    Does not exercise regularly    Current Medical Providers:  Patient Care Team:  Ayaz Luna MD as PCP - Amanda Simon MD as Consulting Physician (Plastic Surgery)    Outpatient Medications Prior to  Visit   Medication Sig Dispense Refill   • ALBUTEROL SULFATE  (90 Base) MCG/ACT inhaler INHALE 2 PUFFS BY MOUTH EVERY 4 HOURS AS NEEDED FOR WHEEZING 42.5 g 2   • allopurinol (ZYLOPRIM) 300 MG tablet TAKE 1 TABLET BY MOUTH  DAILY 90 tablet 3   • atorvastatin (LIPITOR) 20 MG tablet TAKE 1 TABLET BY MOUTH  DAILY 90 tablet 3   • Cholecalciferol 1000 units tablet VITAMIN D TABS     • citalopram (CeleXA) 20 MG tablet TAKE 1 TABLET BY MOUTH  DAILY 90 tablet 3   • Fluzone High-Dose Quadrivalent 0.7 ML suspension prefilled syringe PHARMACIST ADMINISTERED IMMUNIZATION ADMINISTERED AT TIME OF DISPENSING     • l-methylfolate-algae-B12-B6 3-90.314-2-35 MG capsule capsule Take 1 capsule by mouth 2 (Two) Times a Day. 180 capsule 3   • levothyroxine (SYNTHROID, LEVOTHROID) 25 MCG tablet TAKE 1 TABLET BY MOUTH  DAILY 90 tablet 3   • metoprolol succinate XL (TOPROL-XL) 100 MG 24 hr tablet TAKE 1 TABLET BY MOUTH  DAILY 90 tablet 1   • montelukast (SINGULAIR) 10 MG tablet TAKE 1 TABLET BY MOUTH  DAILY 90 tablet 3   • omeprazole (priLOSEC) 40 MG capsule TAKE 1 CAPSULE BY MOUTH  DAILY 90 capsule 3   • promethazine (PHENERGAN) 25 MG tablet Take 1 tablet by mouth Every 8 (Eight) Hours As Needed for Nausea or Vomiting. 10 tablet 1   • triamcinolone (KENALOG) 0.1 % ointment APPLY TOPICALLY TO LOWER LEG EVERY DAY     • triamcinolone (KENALOG) 0.5 % ointment Apply  topically to the appropriate area as directed 2 (Two) Times a Day. 60 g 1   • valsartan-hydrochlorothiazide (DIOVAN-HCT) 320-25 MG per tablet TAKE 1 TABLET BY MOUTH  DAILY 90 tablet 3   • vitamin E 100 UNIT capsule VITAMIN E CAPS     • budesonide-formoterol (Symbicort) 80-4.5 MCG/ACT inhaler Inhale 2 puffs 2 (Two) Times a Day for 30 days. 10.2 g 3   • hydrALAZINE (APRESOLINE) 50 MG tablet Take 1 tablet by mouth 3 (Three) Times a Day for 30 days. 90 tablet 11   • ibandronate (Boniva) 150 MG tablet Take 1 tablet by mouth Every 30 (Thirty) Days for 3 doses. 3 tablet 3     No  facility-administered medications prior to visit.       No opioid medication identified on active medication list. I have reviewed chart for other potential  high risk medication/s and harmful drug interactions in the elderly.          Aspirin is not on active medication list.  Aspirin use is not indicated based on review of current medical condition/s. Risk of harm outweighs potential benefits.  .    Patient Active Problem List   Diagnosis   • Medicare annual wellness visit, subsequent   • Hypertension   • Hyperlipidemia   • Fibrocystic breast   • Back pain   • Depression   • Edema   • Gout   • Irritable bowel syndrome   • Lymphedema of lower extremity   • Peripheral venous insufficiency   • Polyosteoarthritis   • Reactive airways dysfunction syndrome (HCC)   • Cough   • Type 2 diabetes mellitus with autonomic neuropathy (HCC)   • Idiopathic progressive neuropathy     Advance Care Planning  Advance Directive is on file.  ACP discussion was declined by the patient. Patient has an advance directive in EMR which is still valid.  is not on file - she has at home    Review of Systems   Constitutional: Negative.  Negative for activity change, appetite change, fatigue and fever.   Respiratory: Positive for cough and shortness of breath.         Has h/o tobacco abuse -quit smoking in 2006    Uses symbicort routinely, and uses her rescue inhaler once daily - uses it when she comes across it in her purse or at home about once daily   Cardiovascular: Positive for chest pain. Negative for palpitations.        Had an episode of chest pain the other day, describes at a tightness, lasted about 1 hour, took a baby aspirin and it went away  Was not exerting herself when it happened  She denies any associated nausea, or shortness of breath, she denies that there was any radiation    Gastrointestinal: Negative.  Negative for abdominal pain, blood in stool, constipation and diarrhea.        Occasional diarrhea or constipation, not  "with any frequency   Genitourinary: Positive for urgency. Negative for dysuria and frequency.        Is having some urinary incontinence for the past 1 year  Does have the urge/sensation of needing to go but frequently becomes incontinent   Musculoskeletal: Positive for back pain.        Endorses chronic low back pain with h/o scoliosis, spinal stenosis and arthritis - rests to get relief   Skin:        Has chronic skin rash on her b/l le  Has seen dermatology and uses a steroid cream with some relief   Neurological: Negative for dizziness, weakness and light-headedness.   Psychiatric/Behavioral: Negative for dysphoric mood and sleep disturbance. The patient is not nervous/anxious.         Uses citalopram with good results for her mood        Objective    Vitals:    22 1103   BP: 126/80   BP Location: Left arm   Patient Position: Sitting   Pulse: 80   Resp: 16   SpO2: 94%   Weight: 78.9 kg (174 lb)   Height: 157.5 cm (62.01\")     Estimated body mass index is 31.82 kg/m² as calculated from the following:    Height as of this encounter: 157.5 cm (62.01\").    Weight as of this encounter: 78.9 kg (174 lb).    BMI is >= 30 and <35. (Class 1 Obesity). The following options were offered after discussion;: exercise counseling/recommendations and nutrition counseling/recommendations      Does the patient have evidence of cognitive impairment? No    Physical Exam            HEALTH RISK ASSESSMENT    Smoking Status:  Social History     Tobacco Use   Smoking Status Former Smoker   • Packs/day: 1.50   • Years: 40.00   • Pack years: 60.00   • Types: Cigarettes   • Start date: 1965   • Quit date: 2006   • Years since quittin.6   Smokeless Tobacco Never Used     Alcohol Consumption:  Social History     Substance and Sexual Activity   Alcohol Use Yes   • Alcohol/week: 11.0 standard drinks   • Types: 4 Glasses of wine, 7 Shots of liquor per week    Comment: daily, vodka tonic     Fall Risk Screen:    STEADI Fall " Risk Assessment was completed, and patient is at LOW risk for falls.Assessment completed on:9/7/2022    Depression Screening:  PHQ-2/PHQ-9 Depression Screening 9/7/2022   Retired PHQ-9 Total Score -   Retired Total Score -   Little Interest or Pleasure in Doing Things 0-->not at all   Feeling Down, Depressed or Hopeless 0-->not at all   PHQ-9: Brief Depression Severity Measure Score 0       Health Habits and Functional and Cognitive Screening:  Functional & Cognitive Status 9/7/2022   Do you have difficulty preparing food and eating? No   Do you have difficulty bathing yourself, getting dressed or grooming yourself? No   Do you have difficulty using the toilet? No   Do you have difficulty moving around from place to place? No   Do you have trouble with steps or getting out of a bed or a chair? No   Current Diet Well Balanced Diet   Dental Exam Up to date   Eye Exam Up to date   Exercise (times per week) 0 times per week   Current Exercises Include -        Exercise Comment -   Current Exercise Activities Include -   Do you need help using the phone?  No   Are you deaf or do you have serious difficulty hearing?  No   Do you need help with transportation? No   Do you need help shopping? No   Do you need help preparing meals?  No   Do you need help with housework?  No   Do you need help with laundry? No   Do you need help taking your medications? No   Do you need help managing money? No   Do you ever drive or ride in a car without wearing a seat belt? No   Have you felt unusual stress, anger or loneliness in the last month? No   Who do you live with? Alone   If you need help, do you have trouble finding someone available to you? No   Have you been bothered in the last four weeks by sexual problems? No   Do you have difficulty concentrating, remembering or making decisions? No       Age-appropriate Screening Schedule:  Refer to the list below for future screening recommendations based on patient's age, sex and/or  medical conditions. Orders for these recommended tests are listed in the plan section. The patient has been provided with a written plan.    Health Maintenance   Topic Date Due   • URINE MICROALBUMIN  Never done   • DIABETIC FOOT EXAM  Never done   • DIABETIC EYE EXAM  11/05/2019   • HEMOGLOBIN A1C  09/02/2022   • INFLUENZA VACCINE  10/01/2022   • LIPID PANEL  03/02/2023   • DXA SCAN  10/25/2023   • MAMMOGRAM  10/29/2023   • TDAP/TD VACCINES (2 - Td or Tdap) 10/02/2030   • ZOSTER VACCINE  Completed              Assessment & Plan   CMS Preventative Services Quick Reference  Risk Factors Identified During Encounter  Fall Risk-High or Moderate  Hearing Problem  Obesity/Overweight   Urinary Incontinence  The above risks/problems have been discussed with the patient.  Follow up actions/plans if indicated are seen below in the Assessment/Plan Section.  Pertinent information has been shared with the patient in the After Visit Summary.    Diagnoses and all orders for this visit:    1. Medicare annual wellness visit, subsequent (Primary)    2. Primary hypertension  -     Comprehensive Metabolic Panel; Future    3. Mixed hyperlipidemia  -     Comprehensive Metabolic Panel; Future  -     Lipid Panel; Future    4. Acquired hypothyroidism  -     TSH; Future    5. Vitamin D deficiency, unspecified   -     Vitamin D 25 Hydroxy; Future    6. Type 2 diabetes mellitus with diabetic autonomic neuropathy, with long-term current use of insulin (HCC)  -     Comprehensive Metabolic Panel; Future  -     Hemoglobin A1c; Future  -     MicroAlbumin, Urine, Random - Urine, Clean Catch; Future    7. Encounter for screening mammogram for malignant neoplasm of breast  -     Mammo Screening Digital Tomosynthesis Bilateral With CAD; Future    8. Annual physical exam  -     Comprehensive Metabolic Panel; Future  -     CBC (No Diff); Future  -     Urinalysis With Culture If Indicated - Urine, Clean Catch; Future    9. Urinary incontinence,  unspecified type  -     Ambulatory Referral to Urology    10. Edema, lower extremity  -     US Ankle / Brachial Indices Extremity Complete; Future    11. Other specified symptoms and signs involving the circulatory and respiratory systems   -     US Ankle / Brachial Indices Extremity Complete; Future        Follow Up:   Return in about 1 year (around 9/7/2023) for Annual physical, Medicare Wellness.     An After Visit Summary and PPPS were made available to the patient.    {Optional Chart Navigation Links Wrapup  Review (Popup)  Advance Care Planning  Labs  CC  Problem List  Visit Diagnosis  Medications  Result Review  Imaging  [facility] Maintenance  Quality  BestPractice  SmartSets  SnapShot  Encounters  Notes  Media  Procedures :23}      {Time Spent-Use for E/M Coding (Optional):94255}

## 2022-09-07 NOTE — PROGRESS NOTES
The ABCs of the Annual Wellness Visit  Subsequent Medicare Wellness Visit    Chief Complaint   Patient presents with   • Medicare Wellness-subsequent      Subjective    History of Present Illness:  Lori Sutton is a 72 y.o. female who presents for a Subsequent Medicare Wellness Visit and annual physical exam.  She has a few concerns as outlined in ROS.  She is fasting for labs.    The following portions of the patient's history were reviewed and   updated as appropriate: allergies, current medications, past family history, past medical history, past social history, past surgical history and problem list.    Compared to one year ago, the patient feels her physical   health is better.    Compared to one year ago, the patient feels her mental   health is the same.    Recent Hospitalizations:  She was not admitted to the hospital during the last year.     Ms. Sutton is a patient of Dr. Luna and is previously unknown to me  She has  H/o HTN, T2DM, hyperlipidemia, IBS, depression, gout, arthritis, idiopathic neuropathy, reactive airway  Her current medicines are albuterol hfa, allopurinol, atorvastatin, symbicort, vit d, celexa, hydralazine, levothyroxine, metoprolol, singulair, omeprazole, valsartan-hctz, vitamin e    Last DM eye exam - 11/2021, has an appointment scheduled for 11/30/22  Cologuard 9/2021 was negative  Mammogram 10/2021 followed by b/l US which was negative, recommendation to continue annual screening  DEXA scan in 8/2021    BP today is well controlled    BP Readings from Last 3 Encounters:   09/07/22 126/80   03/16/22 140/88   03/02/22 122/70     Does not check Blood Sugars at home   She stopped taking the rybelsus about 1 month ago - caused her to feel nauseated and occasionally she would vomit    Endorses that she follows a keto diet to help manage her blood sugars    Does not exercise regularly    Current Medical Providers:  Patient Care Team:  Ayaz Luna MD as PCP - General [facility]  Amanda Almodovar MD as Consulting Physician (Plastic Surgery)    Outpatient Medications Prior to Visit   Medication Sig Dispense Refill   • ALBUTEROL SULFATE  (90 Base) MCG/ACT inhaler INHALE 2 PUFFS BY MOUTH EVERY 4 HOURS AS NEEDED FOR WHEEZING 42.5 g 2   • allopurinol (ZYLOPRIM) 300 MG tablet TAKE 1 TABLET BY MOUTH  DAILY 90 tablet 3   • atorvastatin (LIPITOR) 20 MG tablet TAKE 1 TABLET BY MOUTH  DAILY 90 tablet 3   • Cholecalciferol 1000 units tablet VITAMIN D TABS     • citalopram (CeleXA) 20 MG tablet TAKE 1 TABLET BY MOUTH  DAILY 90 tablet 3   • Fluzone High-Dose Quadrivalent 0.7 ML suspension prefilled syringe PHARMACIST ADMINISTERED IMMUNIZATION ADMINISTERED AT TIME OF DISPENSING     • l-methylfolate-algae-B12-B6 3-90.314-2-35 MG capsule capsule Take 1 capsule by mouth 2 (Two) Times a Day. 180 capsule 3   • levothyroxine (SYNTHROID, LEVOTHROID) 25 MCG tablet TAKE 1 TABLET BY MOUTH  DAILY 90 tablet 3   • metoprolol succinate XL (TOPROL-XL) 100 MG 24 hr tablet TAKE 1 TABLET BY MOUTH  DAILY 90 tablet 1   • montelukast (SINGULAIR) 10 MG tablet TAKE 1 TABLET BY MOUTH  DAILY 90 tablet 3   • omeprazole (priLOSEC) 40 MG capsule TAKE 1 CAPSULE BY MOUTH  DAILY 90 capsule 3   • promethazine (PHENERGAN) 25 MG tablet Take 1 tablet by mouth Every 8 (Eight) Hours As Needed for Nausea or Vomiting. 10 tablet 1   • triamcinolone (KENALOG) 0.1 % ointment APPLY TOPICALLY TO LOWER LEG EVERY DAY     • triamcinolone (KENALOG) 0.5 % ointment Apply  topically to the appropriate area as directed 2 (Two) Times a Day. 60 g 1   • valsartan-hydrochlorothiazide (DIOVAN-HCT) 320-25 MG per tablet TAKE 1 TABLET BY MOUTH  DAILY 90 tablet 3   • vitamin E 100 UNIT capsule VITAMIN E CAPS     • budesonide-formoterol (Symbicort) 80-4.5 MCG/ACT inhaler Inhale 2 puffs 2 (Two) Times a Day for 30 days. 10.2 g 3   • hydrALAZINE (APRESOLINE) 50 MG tablet Take 1 tablet by mouth 3 (Three) Times a Day for 30 days. 90 tablet 11   • ibandronate  (Boniva) 150 MG tablet Take 1 tablet by mouth Every 30 (Thirty) Days for 3 doses. 3 tablet 3     No facility-administered medications prior to visit.       No opioid medication identified on active medication list. I have reviewed chart for other potential  high risk medication/s and harmful drug interactions in the elderly.          Aspirin is not on active medication list.  Aspirin use is not indicated based on review of current medical condition/s. Risk of harm outweighs potential benefits.  .    Patient Active Problem List   Diagnosis   • Medicare annual wellness visit, subsequent   • Hypertension   • Hyperlipidemia   • Fibrocystic breast   • Back pain   • Depression   • Edema   • Gout   • Irritable bowel syndrome   • Lymphedema of lower extremity   • Peripheral venous insufficiency   • Polyosteoarthritis   • Reactive airways dysfunction syndrome (HCC)   • Cough   • Type 2 diabetes mellitus with autonomic neuropathy (HCC)   • Idiopathic progressive neuropathy     Advance Care Planning  Advance Directive is on file.  ACP discussion was declined by the patient. Patient has an advance directive in EMR which is still valid.  is not on file - she has at home    Review of Systems   Constitutional: Negative.  Negative for activity change, appetite change, fatigue and fever.   Respiratory: Positive for cough and shortness of breath.         Has h/o tobacco abuse -quit smoking in 2006    Uses symbicort routinely, and uses her rescue inhaler once daily - uses it when she comes across it in her purse or at home about once daily   Cardiovascular: Positive for leg swelling. Negative for palpitations.        She has intermittent le edema, worse at end of day, improved in morning, has been advised to use support hose in the past but she has been unable to manage getting them on and off   Gastrointestinal: Negative.  Negative for abdominal pain, blood in stool, constipation and diarrhea.        Occasional diarrhea or  "constipation, not with any frequency   Genitourinary: Positive for urgency. Negative for dysuria and frequency.        Is having some urinary incontinence for the past 1 year  Does have the urge/sensation of needing to go but frequently becomes incontinent   Musculoskeletal: Positive for back pain.        Endorses chronic low back pain with h/o scoliosis, spinal stenosis and arthritis - rests to get relief   Skin:        Has chronic skin rash on her b/l le  Has seen dermatology and uses a steroid cream with some relief   Neurological: Negative for dizziness, weakness and light-headedness.   Psychiatric/Behavioral: Negative for dysphoric mood and sleep disturbance. The patient is not nervous/anxious.         Uses citalopram with good results for her mood        Objective    Vitals:    22 1103   BP: 126/80   BP Location: Left arm   Patient Position: Sitting   Pulse: 80   Resp: 16   SpO2: 94%   Weight: 78.9 kg (174 lb)   Height: 157.5 cm (62.01\")     Estimated body mass index is 31.82 kg/m² as calculated from the following:    Height as of this encounter: 157.5 cm (62.01\").    Weight as of this encounter: 78.9 kg (174 lb).    BMI is >= 30 and <35. (Class 1 Obesity). The following options were offered after discussion;: exercise counseling/recommendations and nutrition counseling/recommendations      Does the patient have evidence of cognitive impairment? No    Physical Exam            HEALTH RISK ASSESSMENT    Smoking Status:  Social History     Tobacco Use   Smoking Status Former Smoker   • Packs/day: 1.50   • Years: 40.00   • Pack years: 60.00   • Types: Cigarettes   • Start date: 1965   • Quit date: 2006   • Years since quittin.6   Smokeless Tobacco Never Used     Alcohol Consumption:  Social History     Substance and Sexual Activity   Alcohol Use Yes   • Alcohol/week: 11.0 standard drinks   • Types: 4 Glasses of wine, 7 Shots of liquor per week    Comment: daily, vodka tonic     Fall Risk " Screen:    JHOANA Fall Risk Assessment was completed, and patient is at LOW risk for falls.Assessment completed on:9/7/2022    Depression Screening:  PHQ-2/PHQ-9 Depression Screening 9/7/2022   Retired PHQ-9 Total Score -   Retired Total Score -   Little Interest or Pleasure in Doing Things 0-->not at all   Feeling Down, Depressed or Hopeless 0-->not at all   PHQ-9: Brief Depression Severity Measure Score 0       Health Habits and Functional and Cognitive Screening:  Functional & Cognitive Status 9/7/2022   Do you have difficulty preparing food and eating? No   Do you have difficulty bathing yourself, getting dressed or grooming yourself? No   Do you have difficulty using the toilet? No   Do you have difficulty moving around from place to place? No   Do you have trouble with steps or getting out of a bed or a chair? No   Current Diet Well Balanced Diet   Dental Exam Up to date   Eye Exam Up to date   Exercise (times per week) 0 times per week   Current Exercises Include -        Exercise Comment -   Current Exercise Activities Include -   Do you need help using the phone?  No   Are you deaf or do you have serious difficulty hearing?  No   Do you need help with transportation? No   Do you need help shopping? No   Do you need help preparing meals?  No   Do you need help with housework?  No   Do you need help with laundry? No   Do you need help taking your medications? No   Do you need help managing money? No   Do you ever drive or ride in a car without wearing a seat belt? No   Have you felt unusual stress, anger or loneliness in the last month? No   Who do you live with? Alone   If you need help, do you have trouble finding someone available to you? No   Have you been bothered in the last four weeks by sexual problems? No   Do you have difficulty concentrating, remembering or making decisions? No       Age-appropriate Screening Schedule:  Refer to the list below for future screening recommendations based on patient's  age, sex and/or medical conditions. Orders for these recommended tests are listed in the plan section. The patient has been provided with a written plan.    Health Maintenance   Topic Date Due   • URINE MICROALBUMIN  Never done   • DIABETIC FOOT EXAM  Never done   • DIABETIC EYE EXAM  11/05/2019   • HEMOGLOBIN A1C  09/02/2022   • INFLUENZA VACCINE  10/01/2022   • LIPID PANEL  03/02/2023   • DXA SCAN  10/25/2023   • MAMMOGRAM  10/29/2023   • TDAP/TD VACCINES (2 - Td or Tdap) 10/02/2030   • ZOSTER VACCINE  Completed              Assessment & Plan   CMS Preventative Services Quick Reference  Risk Factors Identified During Encounter  Fall Risk-High or Moderate  Hearing Problem  Obesity/Overweight   Urinary Incontinence  The above risks/problems have been discussed with the patient.  Follow up actions/plans if indicated are seen below in the Assessment/Plan Section.  Pertinent information has been shared with the patient in the After Visit Summary.    Diagnoses and all orders for this visit:    1. Medicare annual wellness visit, subsequent (Primary)    2. Primary hypertension  -     Comprehensive Metabolic Panel; Future    3. Mixed hyperlipidemia  -     Comprehensive Metabolic Panel; Future  -     Lipid Panel; Future    4. Acquired hypothyroidism  -     TSH; Future    5. Vitamin D deficiency, unspecified   -     Vitamin D 25 Hydroxy; Future    6. Type 2 diabetes mellitus with diabetic autonomic neuropathy, with long-term current use of insulin (HCC)  -     Comprehensive Metabolic Panel; Future  -     Hemoglobin A1c; Future  -     MicroAlbumin, Urine, Random - Urine, Clean Catch; Future    7. Encounter for screening mammogram for malignant neoplasm of breast  -     Mammo Screening Digital Tomosynthesis Bilateral With CAD; Future    8. Annual physical exam  -     Comprehensive Metabolic Panel; Future  -     CBC (No Diff); Future  -     Urinalysis With Culture If Indicated - Urine, Clean Catch; Future    9. Urinary  incontinence, unspecified type  -     Ambulatory Referral to Urology    10. Edema, lower extremity  -     US Ankle / Brachial Indices Extremity Complete; Future    11. Other specified symptoms and signs involving the circulatory and respiratory systems   -     US Ankle / Brachial Indices Extremity Complete; Future      Follow Up:   Return in about 1 year (around 9/7/2023) for Annual physical, Medicare Wellness.     An After Visit Summary and PPPS were made available to the patient.

## 2022-09-08 DIAGNOSIS — M79.606 PAIN OF LOWER EXTREMITY, UNSPECIFIED LATERALITY: ICD-10-CM

## 2022-09-08 DIAGNOSIS — R09.89 OTHER SPECIFIED SYMPTOMS AND SIGNS INVOLVING THE CIRCULATORY AND RESPIRATORY SYSTEMS: ICD-10-CM

## 2022-09-08 DIAGNOSIS — I89.0 LYMPHEDEMA OF LOWER EXTREMITY, UNSPECIFIED LATERALITY: ICD-10-CM

## 2022-09-08 DIAGNOSIS — R60.0 EDEMA, LOWER EXTREMITY: Primary | ICD-10-CM

## 2022-09-08 LAB — ALBUMIN UR-MCNC: <1.2 MG/DL

## 2022-09-10 ENCOUNTER — PATIENT ROUNDING (BHMG ONLY) (OUTPATIENT)
Dept: FAMILY MEDICINE CLINIC | Facility: CLINIC | Age: 72
End: 2022-09-10

## 2022-09-10 NOTE — PROGRESS NOTES
September 10, 2022    [employer] message sent to Lori [place]  [facility]  [address]  [address]  [place] [address].

## 2022-09-14 DIAGNOSIS — R60.0 EDEMA, LOWER EXTREMITY: Primary | ICD-10-CM

## 2022-09-14 DIAGNOSIS — M79.606 PAIN OF LOWER EXTREMITY, UNSPECIFIED LATERALITY: ICD-10-CM

## 2022-09-14 RX ORDER — VALSARTAN AND HYDROCHLOROTHIAZIDE 320; 25 MG/1; MG/1
TABLET, FILM COATED ORAL
Qty: 90 TABLET | Refills: 3 | Status: SHIPPED | OUTPATIENT
Start: 2022-09-14

## 2022-09-19 ENCOUNTER — HOSPITAL ENCOUNTER (OUTPATIENT)
Dept: CARDIOLOGY | Facility: HOSPITAL | Age: 72
Discharge: HOME | End: 2022-09-19
Admitting: NURSE PRACTITIONER

## 2022-09-19 DIAGNOSIS — R09.89 OTHER SPECIFIED SYMPTOMS AND SIGNS INVOLVING THE CIRCULATORY AND RESPIRATORY SYSTEMS: ICD-10-CM

## 2022-09-19 DIAGNOSIS — M79.606 PAIN OF LOWER EXTREMITY, UNSPECIFIED LATERALITY: ICD-10-CM

## 2022-09-19 DIAGNOSIS — I89.0 LYMPHEDEMA OF LOWER EXTREMITY, UNSPECIFIED LATERALITY: ICD-10-CM

## 2022-09-19 DIAGNOSIS — R60.0 EDEMA, LOWER EXTREMITY: ICD-10-CM

## 2022-09-19 LAB
BH CV LOWER ARTERIAL LEFT ABI RATIO: 1.2
BH CV LOWER ARTERIAL LEFT DORSALIS PEDIS SYS MAX: 161
BH CV LOWER ARTERIAL LEFT GREAT TOE SYS MAX: 143
BH CV LOWER ARTERIAL LEFT POST TIBIAL SYS MAX: 179
BH CV LOWER ARTERIAL LEFT TBI RATIO: 0.96
BH CV LOWER ARTERIAL RIGHT ABI RATIO: 1.21
BH CV LOWER ARTERIAL RIGHT DORSALIS PEDIS SYS MAX: 171
BH CV LOWER ARTERIAL RIGHT GREAT TOE SYS MAX: 141
BH CV LOWER ARTERIAL RIGHT POST TIBIAL SYS MAX: 180
BH CV LOWER ARTERIAL RIGHT TBI RATIO: 0.95
MAXIMAL PREDICTED HEART RATE: 146 BPM
STRESS TARGET HR: 124 BPM
UPPER ARTERIAL LEFT ARM BRACHIAL SYS MAX: 140 MMHG
UPPER ARTERIAL RIGHT ARM BRACHIAL SYS MAX: 149 MMHG

## 2022-09-19 PROCEDURE — 93922 UPR/L XTREMITY ART 2 LEVELS: CPT

## 2022-09-27 ENCOUNTER — LAB (OUTPATIENT)
Dept: FAMILY MEDICINE CLINIC | Facility: CLINIC | Age: 72
End: 2022-09-27

## 2022-09-27 DIAGNOSIS — R79.89 LOW SERUM SODIUM: ICD-10-CM

## 2022-09-27 LAB
ALBUMIN SERPL-MCNC: 4 G/DL (ref 3.5–5.2)
ALBUMIN/GLOB SERPL: 1.5 G/DL
ALP SERPL-CCNC: 84 U/L (ref 39–117)
ALT SERPL W P-5'-P-CCNC: 19 U/L (ref 1–33)
ANION GAP SERPL CALCULATED.3IONS-SCNC: 12 MMOL/L (ref 5–15)
AST SERPL-CCNC: 29 U/L (ref 1–32)
BILIRUB SERPL-MCNC: 0.4 MG/DL (ref 0–1.2)
BUN SERPL-MCNC: 11 MG/DL (ref 8–23)
BUN/CREAT SERPL: 18.6 (ref 7–25)
CALCIUM SPEC-SCNC: 9.6 MG/DL (ref 8.6–10.5)
CHLORIDE SERPL-SCNC: 91 MMOL/L (ref 98–107)
CO2 SERPL-SCNC: 30 MMOL/L (ref 22–29)
CREAT SERPL-MCNC: 0.59 MG/DL (ref 0.57–1)
EGFRCR SERPLBLD CKD-EPI 2021: 94.7 ML/MIN/1.73
GLOBULIN UR ELPH-MCNC: 2.6 GM/DL
GLUCOSE SERPL-MCNC: 100 MG/DL (ref 65–99)
POTASSIUM SERPL-SCNC: 4 MMOL/L (ref 3.5–5.2)
PROT SERPL-MCNC: 6.6 G/DL (ref 6–8.5)
SODIUM SERPL-SCNC: 133 MMOL/L (ref 136–145)

## 2022-09-27 PROCEDURE — 36415 COLL VENOUS BLD VENIPUNCTURE: CPT

## 2022-09-27 PROCEDURE — 80053 COMPREHEN METABOLIC PANEL: CPT | Performed by: NURSE PRACTITIONER

## 2022-09-28 ENCOUNTER — PATIENT MESSAGE (OUTPATIENT)
Dept: FAMILY MEDICINE CLINIC | Facility: CLINIC | Age: 72
End: 2022-09-28

## 2022-10-02 DIAGNOSIS — N39.46 MIXED STRESS AND URGE URINARY INCONTINENCE: Primary | ICD-10-CM

## 2022-10-10 ENCOUNTER — HOSPITAL ENCOUNTER (OUTPATIENT)
Dept: MAMMOGRAPHY | Facility: HOSPITAL | Age: 72
Discharge: HOME | End: 2022-10-10
Admitting: NURSE PRACTITIONER

## 2022-10-10 DIAGNOSIS — Z12.31 ENCOUNTER FOR SCREENING MAMMOGRAM FOR MALIGNANT NEOPLASM OF BREAST: ICD-10-CM

## 2022-10-10 DIAGNOSIS — R92.8 ABNORMALITY OF LEFT BREAST ON SCREENING MAMMOGRAM: Primary | ICD-10-CM

## 2022-10-10 PROCEDURE — 77067 SCR MAMMO BI INCL CAD: CPT

## 2022-10-10 PROCEDURE — 77063 BREAST TOMOSYNTHESIS BI: CPT

## 2022-10-20 ENCOUNTER — HOSPITAL ENCOUNTER (OUTPATIENT)
Dept: MAMMOGRAPHY | Facility: HOSPITAL | Age: 72
Discharge: HOME | End: 2022-10-20
Admitting: NURSE PRACTITIONER

## 2022-10-20 DIAGNOSIS — R92.8 ABNORMALITY OF LEFT BREAST ON SCREENING MAMMOGRAM: ICD-10-CM

## 2022-10-20 PROCEDURE — G0279 TOMOSYNTHESIS, MAMMO: HCPCS

## 2022-10-20 PROCEDURE — 77065 DX MAMMO INCL CAD UNI: CPT

## 2022-10-20 RX ORDER — TRIAMCINOLONE ACETONIDE 5 MG/G
OINTMENT TOPICAL
Qty: 90 G | Refills: 1 | Status: SHIPPED | OUTPATIENT
Start: 2022-10-20

## 2022-11-09 RX ORDER — DILTIAZEM HYDROCHLORIDE 60 MG/1
TABLET, FILM COATED ORAL
Qty: 10.2 G | Refills: 11 | Status: SHIPPED | OUTPATIENT
Start: 2022-11-09

## 2023-01-09 ENCOUNTER — TELEPHONE (OUTPATIENT)
Dept: FAMILY MEDICINE CLINIC | Facility: CLINIC | Age: 73
End: 2023-01-09
Payer: MEDICARE

## 2023-01-09 NOTE — TELEPHONE ENCOUNTER
Patient is wanting to see PMJ for two reasons - bp concerns and hands & legs numbness.    She was having swelling in her legs and her dermatologist told her to take a water pill.  She had some Lasix at home so she started taking that.  Then her bp dropped down to 80/40 so she stopped taking her Valsartan HCTZ and her Hydralazine and her bp has not been that low again.  She is still taking Metoprolol and Lasix.      When can PMJ see patient?

## 2023-01-11 ENCOUNTER — OFFICE VISIT (OUTPATIENT)
Dept: FAMILY MEDICINE CLINIC | Facility: CLINIC | Age: 73
End: 2023-01-11
Payer: MEDICARE

## 2023-01-11 VITALS
OXYGEN SATURATION: 96 % | BODY MASS INDEX: 32.57 KG/M2 | WEIGHT: 177 LBS | HEIGHT: 62 IN | SYSTOLIC BLOOD PRESSURE: 130 MMHG | HEART RATE: 88 BPM | DIASTOLIC BLOOD PRESSURE: 80 MMHG | RESPIRATION RATE: 16 BRPM

## 2023-01-11 DIAGNOSIS — R60.0 LOCALIZED EDEMA: ICD-10-CM

## 2023-01-11 DIAGNOSIS — G60.3 IDIOPATHIC PROGRESSIVE NEUROPATHY: ICD-10-CM

## 2023-01-11 DIAGNOSIS — I10 PRIMARY HYPERTENSION: Primary | ICD-10-CM

## 2023-01-11 PROCEDURE — 99214 OFFICE O/P EST MOD 30 MIN: CPT | Performed by: FAMILY MEDICINE

## 2023-01-11 RX ORDER — POTASSIUM CHLORIDE 750 MG/1
20 TABLET, FILM COATED, EXTENDED RELEASE ORAL DAILY
Qty: 180 TABLET | Refills: 2 | Status: SHIPPED | OUTPATIENT
Start: 2023-01-11 | End: 2023-04-11

## 2023-01-11 RX ORDER — FUROSEMIDE 40 MG/1
40 TABLET ORAL DAILY
Qty: 90 TABLET | Refills: 2 | Status: SHIPPED | OUTPATIENT
Start: 2023-01-11 | End: 2023-04-11

## 2023-01-11 NOTE — PROGRESS NOTES
Chief Complaint  Peripheral Neuropathy and Hypertension    Subjective      Lori Sutton presents to [facility]  History of Present Illness  For having neuropathy in hand and feet. Also to discuss BP. She has brought in her machine for comparison  160/86    The patient presents today for a followup.    The patient states that she discontinued valsartan and hydralazine approximately 1 week ago. She notes that she was seen by her dermatologist for bilateral lower extremity edema and was prescribed furosemide 40 mg daily. She states that she has not been taking the furosemide as she was taking other medications. The patient notes that the furosemide seems to help; however, she continues to experience swelling in her bilateral lower extremities if she sits for a prolonged period of time. She states that she is unable to wear compression stockings.    The patient states that she experienced lightheadedness after having a letty. She notes that she checked her blood pressure at that time and it was 80/40 mmHg. She states that she has not taken her medications this morning. The patient states that she is taking metoprolol  mg daily.    The patient states that she is taking albuterol, allopurinol, atorvastatin, citalopram, levothyroxine, vitamin D, Singulair, omeprazole, Symbicort, and vitamin E.    The patient states that she is experiencing numbness in her toes. She notes that it is constant. She states that she is unable to  objects.    The patient states that she was prescribed Rybelsus to help her lose weight; however, she was unable to take it due to it making her sick. She notes that she has a family history of thyroid cancer.    The patient states that she has received her influenza vaccine and COVID-19 vaccine.    The patient states that she was going to have neck surgery at one point, but she got a second opinion and went to physical therapy. She states that she  "may have spurs.    Objective   Vital Signs:  /80 (BP Location: Left arm, Cuff Size: Large Adult)   Pulse 88   Resp 16   Ht 157.5 cm (62\")   Wt 80.3 kg (177 lb)   SpO2 96%   BMI 32.37 kg/m²   Estimated body mass index is 32.37 kg/m² as calculated from the following:    Height as of this encounter: 157.5 cm (62\").    Weight as of this encounter: 80.3 kg (177 lb).       BMI is >= 30 and <35. (Class 1 Obesity). The following options were offered after discussion;: weight loss educational material (shared in after visit summary), exercise counseling/recommendations and nutrition counseling/recommendations      Physical Exam  Constitutional:       Appearance: Normal appearance. She is well-developed. She is obese.   HENT:      Head: Normocephalic and atraumatic.      Right Ear: Tympanic membrane, ear canal and external ear normal.      Left Ear: Tympanic membrane, ear canal and external ear normal.      Nose: Nose normal.      Mouth/Throat:      Mouth: Mucous membranes are moist.      Pharynx: Oropharynx is clear. No oropharyngeal exudate.   Eyes:      Extraocular Movements: Extraocular movements intact.      Conjunctiva/sclera: Conjunctivae normal.      Pupils: Pupils are equal, round, and reactive to light.   Cardiovascular:      Rate and Rhythm: Normal rate and regular rhythm.      Pulses: Normal pulses.      Heart sounds: Normal heart sounds.   Pulmonary:      Breath sounds: Wheezing, rhonchi and rales present.      Comments: Basilar rales at the end of inspiration.  Expiratory wheeze which is scattered about both lung fields.  Distant breath sounds in upper lung fields.  Some rhonchi that clears with cough.  Abdominal:      General: Bowel sounds are normal.      Palpations: Abdomen is soft.      Comments: Central obesity   Musculoskeletal:      Cervical back: Normal range of motion and neck supple.      Right lower leg: Edema present.      Left lower leg: Edema present.      Comments: Degenerative disc " and degenerative joint disease of the lumbosacral spine.  Limited range of motion.    Osteoarthritic changes of the hands elbows shoulders hips and knees.  Painful stiff range of motion.   Skin:     General: Skin is warm and dry.   Neurological:      General: No focal deficit present.      Mental Status: She is alert and oriented to person, place, and time. Mental status is at baseline.   Psychiatric:         Mood and Affect: Mood normal.         Behavior: Behavior normal.         Thought Content: Thought content normal.         Judgment: Judgment normal.          Assessment and Plan     1. Hypertension  - The patient will continue taking metoprolol  mg daily and hydralazine 50 mg as needed. If her blood pressure is over 150 mmHg, she will take a hydralazine, wait 6 hours and check her blood pressure again. If it is still over 150 mmHg, she will take another hydralazine. If her pressure is not staying down, that will be the next one we will add in where she will take it every day. If her pressure is not staying down with what she is taking, then we do not need the valsartan.    2. Bilateral lower extremity edema  - The patient will continue taking furosemide 40 mg daily and potassium 20 mEq daily. She will use on days that furosemide is taken.    3. Hyperlipidemia  - The patient will continue taking atorvastatin.    4. Anxiety  - The patient will continue taking citalopram.    5. Hypothyroidism  - The patient will continue taking levothyroxine.    6. Vitamin D deficiency  - The patient will continue taking vitamin D supplements.    Patient has been erroneously marked as diabetic. Based on the available clinical information, she does not have diabetes and should therefore be excluded from diabetic health maintenance and quality measures for the remainder of the reporting period.     I spent 39 minutes caring for Lori on this date of service. This time includes time spent by me in the following  activities:preparing for the visit, reviewing tests, obtaining and/or reviewing a separately obtained history, performing a medically appropriate examination and/or evaluation , counseling and educating the patient/family/caregiver, ordering medications, tests, or procedures, referring and communicating with other health care professionals , documenting information in the medical record, independently interpreting results and communicating that information with the patient/family/caregiver and care coordination  Follow Up No follow-ups on file.  Patient was given instructions and counseling regarding her condition or for health maintenance advice. Please see specific information pulled into the AVS if appropriate.     Transcribed from ambient dictation for Ayaz Luna MD by Janina Briggs.  01/11/23   11:08 EST    Patient or patient representative verbalized consent to the visit recording.  I have personally performed the services described in this document as transcribed by the above individual, and it is both accurate and complete.  Ayaz Luna MD  1/16/2023  20:33 EST

## 2023-01-18 ENCOUNTER — TELEPHONE (OUTPATIENT)
Dept: FAMILY MEDICINE CLINIC | Facility: CLINIC | Age: 73
End: 2023-01-18
Payer: MEDICARE

## 2023-01-18 DIAGNOSIS — Z79.4 TYPE 2 DIABETES MELLITUS WITH DIABETIC AUTONOMIC NEUROPATHY, WITH LONG-TERM CURRENT USE OF INSULIN: ICD-10-CM

## 2023-01-18 DIAGNOSIS — E11.43 TYPE 2 DIABETES MELLITUS WITH DIABETIC AUTONOMIC NEUROPATHY, WITH LONG-TERM CURRENT USE OF INSULIN: ICD-10-CM

## 2023-01-18 RX ORDER — ALCOHOL 2.38 KG/3.79L
1 GEL TOPICAL 2 TIMES DAILY
Qty: 180 CAPSULE | Refills: 3 | Status: SHIPPED | OUTPATIENT
Start: 2023-01-18

## 2023-01-18 NOTE — TELEPHONE ENCOUNTER
Caller: Loren Suttonsergio SINGH    Relationship: Self    Best call back number: [phone]    What medication are you requesting: METANX 2 X DAILY     If a prescription is needed, what is your preferred pharmacy and phone number: [facility] - [place], [place] - [address] [facility] - [phone]  - [phone]     Additional notes: PT STATED THAT PCP RECOMMENDED THIS MEDICATION AT HER LAST APPT, BUT THAT THE PHARMACY TOLD HER SHE MUST HAVE A PRESCRIPTION FOR IT.

## 2023-01-19 RX ORDER — METOPROLOL SUCCINATE 100 MG/1
TABLET, EXTENDED RELEASE ORAL
Qty: 90 TABLET | Refills: 3 | Status: SHIPPED | OUTPATIENT
Start: 2023-01-19

## 2023-02-02 ENCOUNTER — TELEPHONE (OUTPATIENT)
Dept: FAMILY MEDICINE CLINIC | Facility: CLINIC | Age: 73
End: 2023-02-02
Payer: MEDICARE

## 2023-02-02 DIAGNOSIS — G89.29 CHRONIC BILATERAL LOW BACK PAIN WITH BILATERAL SCIATICA: Primary | ICD-10-CM

## 2023-02-02 DIAGNOSIS — M54.42 CHRONIC BILATERAL LOW BACK PAIN WITH BILATERAL SCIATICA: Primary | ICD-10-CM

## 2023-02-02 DIAGNOSIS — M54.41 CHRONIC BILATERAL LOW BACK PAIN WITH BILATERAL SCIATICA: Primary | ICD-10-CM

## 2023-02-02 NOTE — TELEPHONE ENCOUNTER
Caller: Lori Sutton SAMANTHA    Relationship: Self    Best call back number:   [phone] (Mobile)         What orders are you requesting (i.e. lab or imaging):  HAVING AN MRI DONE IN FEB AND WANTED TO KNOW IF YOU COULD ORDER AN ADDITIONAL TEST FOR LOWER BACK      In what timeframe would the patient need to come in: FEB 15TH AT [place]     Where will you receive your lab/imaging services:  [facility]     Additional notes: LOOKING FOR SUGGESTIONS TO HELP WITH PAIN IN LOWER BACK ALSO

## 2023-02-03 NOTE — TELEPHONE ENCOUNTER
Tell Lori we ordered the requested MRI.  Hopefully they will get it done the same day but she may want to call and request this.

## 2023-02-15 ENCOUNTER — HOSPITAL ENCOUNTER (OUTPATIENT)
Dept: MRI IMAGING | Facility: HOSPITAL | Age: 73
Discharge: HOME | End: 2023-02-15
Admitting: FAMILY MEDICINE
Payer: MEDICARE

## 2023-02-15 DIAGNOSIS — R60.0 LOCALIZED EDEMA: ICD-10-CM

## 2023-02-15 DIAGNOSIS — I10 PRIMARY HYPERTENSION: ICD-10-CM

## 2023-02-15 DIAGNOSIS — G60.3 IDIOPATHIC PROGRESSIVE NEUROPATHY: ICD-10-CM

## 2023-02-15 PROCEDURE — 72141 MRI NECK SPINE W/O DYE: CPT

## 2023-02-16 DIAGNOSIS — M48.02 CERVICAL SPINAL STENOSIS: Primary | ICD-10-CM

## 2023-02-16 NOTE — PROGRESS NOTES
Arlet tell Lori that her MRI shows a tight lesion at the C4-C5 level of her spine.  She needs to sit down with the neurosurgeon and talk about options to follow this.  Someday this may need an intervention to make room for the spinal cord and the nerve roots coming out at this level

## 2023-02-21 ENCOUNTER — TELEPHONE (OUTPATIENT)
Dept: FAMILY MEDICINE CLINIC | Facility: CLINIC | Age: 73
End: 2023-02-21
Payer: MEDICARE

## 2023-02-22 NOTE — PROGRESS NOTES
"Neurosurgical Consultation      Lori SAMANTHA Lesley is a 73 y.o. female is being seen for consultation today at the request of Ayaz Luna MD for neck pain. In the office today patient reports right sided neck pain traveling down her  arm with numbness in her hand.    Chief Complaint   Patient presents with   • Neck Pain     New evaluation        Previous treatment:     HPI: This is a 73-year-old woman with a BMI of 34 as well as significant lung disease who has had a prior thyroidectomy and was a chronic every day smoker at 2 packs/day for greater than 40 years.  Interestingly she does note that she was offered neck surgery when she was 18 years of age.  She elected to proceed with physical therapy and felt as though it provided enough pain relief that she never underwent any surgical intervention.  For greater than 5 years she has had progressive numbness in her hands as well as feet.  She has had progressive worsening dexterity including difficulty signing her name, buttoning a button and picking up a small item off of a table.  She has dropped items on a fairly frequent basis.  She has balance issues especially proprioceptive issues that are worsened in the dark.  She has not had any recent physical therapy and she has not ever undergone any spinal canal injections.  She comments that beginning in December she began noticing right shoulder and deltoid region pain.  This is worsened with activity.  The pain can be exacerbated by \"sleeping on it wrong\".    Past Medical History:   Diagnosis Date   • Fibrocystic breast    • Hyperlipidemia    • Hypertension         Past Surgical History:   Procedure Laterality Date   • APPENDECTOMY  1958   • AUGMENTATION MAMMAPLASTY     • BREAST CYST ASPIRATION     • BREAST CYST EXCISION     • HYSTERECTOMY     • TONSILLECTOMY  1958        Current Outpatient Medications on File Prior to Visit   Medication Sig Dispense Refill   • ALBUTEROL SULFATE  (90 Base) MCG/ACT inhaler " INHALE 2 PUFFS BY MOUTH EVERY 4 HOURS AS NEEDED FOR WHEEZING 42.5 g 2   • allopurinol (ZYLOPRIM) 300 MG tablet TAKE 1 TABLET BY MOUTH  DAILY 90 tablet 3   • atorvastatin (LIPITOR) 20 MG tablet TAKE 1 TABLET BY MOUTH  DAILY 90 tablet 3   • Cholecalciferol 1000 units tablet VITAMIN D TABS     • citalopram (CeleXA) 20 MG tablet TAKE 1 TABLET BY MOUTH  DAILY 90 tablet 3   • furosemide (LASIX) 40 MG tablet Take 1 tablet by mouth Daily for 90 days. 90 tablet 2   • l-methylfolate-algae-B12-B6 3-90.314-2-35 MG capsule capsule Take 1 capsule by mouth 2 (Two) Times a Day. 180 capsule 3   • levothyroxine (SYNTHROID, LEVOTHROID) 25 MCG tablet TAKE 1 TABLET BY MOUTH  DAILY 90 tablet 3   • metoprolol succinate XL (TOPROL-XL) 100 MG 24 hr tablet TAKE 1 TABLET BY MOUTH  DAILY 90 tablet 3   • montelukast (SINGULAIR) 10 MG tablet TAKE 1 TABLET BY MOUTH  DAILY 90 tablet 3   • omeprazole (priLOSEC) 40 MG capsule TAKE 1 CAPSULE BY MOUTH  DAILY 90 capsule 3   • potassium chloride 10 MEQ CR tablet Take 2 tablets by mouth Daily for 90 days. Use on days that furosemide is taken. 180 tablet 2   • promethazine (PHENERGAN) 25 MG tablet Take 1 tablet by mouth Every 8 (Eight) Hours As Needed for Nausea or Vomiting. 10 tablet 1   • Symbicort 80-4.5 MCG/ACT inhaler INHALE 2 INHALATIONS BY  MOUTH TWICE DAILY 10.2 g 11   • triamcinolone (KENALOG) 0.5 % ointment APPLY TOPICALLY TO THE  APPROPRIATE AREA AS  DIRECTED TWICE DAILY 90 g 1   • valsartan-hydrochlorothiazide (DIOVAN-HCT) 320-25 MG per tablet TAKE 1 TABLET BY MOUTH  DAILY 90 tablet 3   • vitamin E 100 UNIT capsule VITAMIN E CAPS     • hydrALAZINE (APRESOLINE) 50 MG tablet Take 1 tablet by mouth 3 (Three) Times a Day for 30 days. 90 tablet 11   • potassium chloride (K-DUR,KLOR-CON) 10 MEQ CR tablet      • solifenacin (VESICARE) 5 MG tablet Take 1 tablet by mouth Every 12 (Twelve) Hours.       No current facility-administered medications on file prior to visit.        Allergies   Allergen  "Reactions   • Nsaids GI Bleeding        Social History     Socioeconomic History   • Marital status:    Tobacco Use   • Smoking status: Former     Packs/day: 1.50     Years: 40.00     Pack years: 60.00     Types: Cigarettes     Start date: 1965     Quit date: 2006     Years since quittin.1   • Smokeless tobacco: Never   Vaping Use   • Vaping Use: Never used   Substance and Sexual Activity   • Alcohol use: Yes     Alcohol/week: 11.0 standard drinks     Types: 4 Glasses of wine, 7 Shots of liquor per week     Comment: daily, vodka tonic   • Drug use: No   • Sexual activity: Not Currently     Partners: Male     Birth control/protection: Post-menopausal          Review of Systems   Constitutional: Positive for activity change.   HENT: Negative.    Eyes: Negative.    Respiratory: Negative.    Cardiovascular: Negative.    Gastrointestinal: Negative.    Endocrine: Negative.    Genitourinary: Negative.    Musculoskeletal: Positive for arthralgias, myalgias, neck pain and neck stiffness.   Allergic/Immunologic: Negative.    Neurological: Positive for weakness and numbness (bilateral hands).   Hematological: Negative.    Psychiatric/Behavioral: Negative.         Physical Examination:     Vitals:    23 1512   BP: 145/83   Pulse: 86   SpO2: 93%   Weight: 83.3 kg (183 lb 9.6 oz)   Height: 157.5 cm (62\")   PainSc:   6        Physical Exam  Eyes:      General: Lids are normal.      Extraocular Movements: Extraocular movements intact.      Pupils: Pupils are equal, round, and reactive to light.   Neurological:      Motor: Motor strength is normal.      Deep Tendon Reflexes:      Reflex Scores:       Tricep reflexes are 2+ on the right side and 2+ on the left side.       Bicep reflexes are 0 on the right side and 0 on the left side.       Brachioradialis reflexes are 0 on the right side and 0 on the left side.       Patellar reflexes are 0 on the right side and 0 on the left side.  Psychiatric:         " Speech: Speech normal.          Neurological Exam  Mental Status  Awake, alert and oriented to person, place and time. Speech is normal. Language is fluent with no aphasia.    Cranial Nerves  CN II: Visual fields full to confrontation.  CN III, IV, VI: Extraocular movements intact bilaterally. Normal lids and orbits bilaterally. Pupils equal round and reactive to light bilaterally.  CN V: Facial sensation is normal.  CN VII: Full and symmetric facial movement.  CN IX, X: Palate elevates symmetrically. Normal gag reflex.  CN XI: Shoulder shrug strength is normal.  CN XII: Tongue midline without atrophy or fasciculations.    Motor  Normal muscle bulk throughout. Strength is 5/5 throughout all four extremities.    Sensory  Right: Loss of sensation in the C5 dermatome.    Reflexes                                            Right                      Left  Brachioradialis                    0                         0  Biceps                                 0                         0  Triceps                                2+                         2+  Patellar                                0                         0    Right pathological reflexes: Lorenzo's absent.  Left pathological reflexes: Lorenzo's absent.    Coordination  Right: Finger-to-nose normal.Left: Finger-to-nose normal.     Visibly evident spinal deformity of her neck.  Patient was unable to climb up onto the exam table secondary to lower extremity weakness.    Result Review  The following data was reviewed by: Wilber Mcgowan MD on 02/23/2023:    Data reviewed: Radiologic studies MRI of the cervical spine shows significant reversal of lordosis.  There is C4-C5 spondylolisthesis with significant central canal stenosis and associated posterior ligamentum flavum buckling.  There is significant anterior osteophytic change in the lower cervical spine.     Assessment/plan:  This is a 73-year-old woman with chronic progressive subjective cervical  myelopathy.  She also appears to have right sided subacute radiculopathy.  Her imaging shows significant cervical deformity with spondylolisthesis and severe spinal cord compression.  She has significant lung disease as well as a prior thyroidectomy.  Surgical repair of her current pathology would be a large surgical intervention.  I recommend additional imaging to better characterize a possible surgical plan.  She will need flexion-extension x-rays of the cervical spine as well as scoliosis x-rays to better evaluate her spinal alignment.  I also recommend a course of physical therapy to improve her overall mobility and minimize the risk of falls.  She should follow-up with me in approximately 6 weeks for clinical reevaluation.  She would need extensive pulmonary and medical clearance prior to any surgical intervention.  I have encouraged her to call with any questions or concerns.    Diagnoses and all orders for this visit:    1. Cervical spondylosis with myelopathy (Primary)  -     XR spine cervical ap and lat w flex and ext; Future  -     XR Spine Scoliosis 2 or 3 Views; Future  -     Ambulatory Referral to Physical Therapy Evaluate and treat         Return in about 6 weeks (around 4/6/2023).            Wilber Mcgowan MD

## 2023-02-23 ENCOUNTER — OFFICE VISIT (OUTPATIENT)
Dept: NEUROSURGERY | Facility: CLINIC | Age: 73
End: 2023-02-23
Payer: MEDICARE

## 2023-02-23 VITALS
HEART RATE: 86 BPM | OXYGEN SATURATION: 93 % | WEIGHT: 183.6 LBS | DIASTOLIC BLOOD PRESSURE: 83 MMHG | SYSTOLIC BLOOD PRESSURE: 145 MMHG | BODY MASS INDEX: 33.79 KG/M2 | HEIGHT: 62 IN

## 2023-02-23 DIAGNOSIS — M47.12 CERVICAL SPONDYLOSIS WITH MYELOPATHY: Primary | ICD-10-CM

## 2023-02-23 PROCEDURE — 99204 OFFICE O/P NEW MOD 45 MIN: CPT | Performed by: NEUROLOGICAL SURGERY

## 2023-02-23 RX ORDER — SOLIFENACIN SUCCINATE 5 MG/1
1 TABLET, FILM COATED ORAL EVERY 12 HOURS SCHEDULED
COMMUNITY
Start: 2023-01-19

## 2023-02-23 RX ORDER — POTASSIUM CHLORIDE 750 MG/1
TABLET, EXTENDED RELEASE ORAL
COMMUNITY
Start: 2023-01-11

## 2023-02-24 ENCOUNTER — TELEPHONE (OUTPATIENT)
Dept: PHYSICAL THERAPY | Facility: CLINIC | Age: 73
End: 2023-02-24

## 2023-02-24 NOTE — TELEPHONE ENCOUNTER
PATIENT CALLED TO BE SCHEDULED AND ASKED FOR A RETURN CALL AT YOUR EARTHLIEST CONVENIENCE-THANK YOU !    
No

## 2023-02-27 DIAGNOSIS — M54.42 CHRONIC BILATERAL LOW BACK PAIN WITH BILATERAL SCIATICA: ICD-10-CM

## 2023-02-27 DIAGNOSIS — M54.41 CHRONIC BILATERAL LOW BACK PAIN WITH BILATERAL SCIATICA: ICD-10-CM

## 2023-02-27 DIAGNOSIS — G89.29 CHRONIC BILATERAL LOW BACK PAIN WITH BILATERAL SCIATICA: ICD-10-CM

## 2023-02-27 NOTE — PROGRESS NOTES
Tell Lori that her MRI shows several problems in her lower back 1 of which likely accounts for the right leg pain.  She has compression of the right L4 nerve root but also compression of L5 nerve root right and left.  She has several surgical targets that a neurosurgeon might be able to help her with.  It depends on how much she is hurting and how aggressive she wants to be.  If she is not getting any better and the pains becoming intractable I would recommend she see one of the neurosurgeons very soon.  If its not too bad and she can live with it we can continue treating conservatively with physical therapy exercise and weight loss.  Let me know what she says and we will go from there

## 2023-03-01 ENCOUNTER — TELEPHONE (OUTPATIENT)
Dept: FAMILY MEDICINE CLINIC | Facility: CLINIC | Age: 73
End: 2023-03-01
Payer: MEDICARE

## 2023-03-01 ENCOUNTER — HOSPITAL ENCOUNTER (OUTPATIENT)
Dept: GENERAL RADIOLOGY | Facility: HOSPITAL | Age: 73
Discharge: HOME | End: 2023-03-01
Payer: MEDICARE

## 2023-03-01 DIAGNOSIS — M47.12 CERVICAL SPONDYLOSIS WITH MYELOPATHY: ICD-10-CM

## 2023-03-01 PROCEDURE — 72082 X-RAY EXAM ENTIRE SPI 2/3 VW: CPT

## 2023-03-01 PROCEDURE — 72050 X-RAY EXAM NECK SPINE 4/5VWS: CPT

## 2023-03-01 NOTE — TELEPHONE ENCOUNTER
Spoke to pt she is going to stick with physical therapy for now        ----- Message from Ayaz Luna MD sent at 2/27/2023  5:23 PM EST -----  Tell Lori that her MRI shows several problems in her lower back 1 of which likely accounts for the right leg pain.  She has compression of the right L4 nerve root but also compression of L5 nerve root right and left.  She has several surgical targets that a neurosurgeon might be able to help her with.  It depends on how much she is hurting and how aggressive she wants to be.  If she is not getting any better and the pains becoming intractable I would recommend she see one of the neurosurgeons very soon.  If its not too bad and she can live with it we can continue treating conservatively with physical therapy exercise and weight loss.  Let me know what she says and we will go from there

## 2023-03-02 ENCOUNTER — TREATMENT (OUTPATIENT)
Dept: PHYSICAL THERAPY | Facility: CLINIC | Age: 73
End: 2023-03-02
Payer: MEDICARE

## 2023-03-02 DIAGNOSIS — R20.0 NUMBNESS AND TINGLING IN BOTH HANDS: ICD-10-CM

## 2023-03-02 DIAGNOSIS — R20.2 NUMBNESS AND TINGLING IN BOTH HANDS: ICD-10-CM

## 2023-03-02 DIAGNOSIS — M54.2 PAIN, NECK: Primary | ICD-10-CM

## 2023-03-02 DIAGNOSIS — R26.89 BALANCE PROBLEM: ICD-10-CM

## 2023-03-02 DIAGNOSIS — M48.02 CERVICAL STENOSIS OF SPINAL CANAL: ICD-10-CM

## 2023-03-02 PROCEDURE — 97163 PT EVAL HIGH COMPLEX 45 MIN: CPT | Performed by: PHYSICAL THERAPIST

## 2023-03-02 PROCEDURE — 97112 NEUROMUSCULAR REEDUCATION: CPT | Performed by: PHYSICAL THERAPIST

## 2023-03-02 NOTE — PROGRESS NOTES
Physical Therapy Initial Evaluation and Plan of Care  [address] [place] [address]    Patient: Lori Sutton   : 1950  Diagnosis/ICD-10 Code:  Pain, neck [M54.2]  Referring practitioner: Wilber Mcgowan MD  Date of Initial Visit: 3/2/2023  Today's Date: 3/2/2023  Patient seen for 1 sessions           Visit Diagnoses:     ICD-10-CM ICD-9-CM   1. Pain, neck  M54.2 723.1   2. Cervical stenosis of spinal canal  M48.02 723.0   3. Numbness and tingling in both hands  R20.0 782.0    R20.2        Subjective Questionnaire: NDI:    ROSS balance      Subjective Evaluation    History of Present Illness  Mechanism of injury: CHIEF C/O   Numbness and the shooting pain into the right shoulder/arm.   CURRENT EPISODE   Reports that she has had some n/t in the R forefinger for years and has been progressing in the amount of numbness in both hands.  She has mentioned this to her PCP at her yearly check for some time.  Then in late Dec the R neck and shldr area also started to hurt.  She is not sure if lifting the Rahul tree is what increased the pain.  PCP ordered MRI; completed at [facility].  She was seen by Dr. Mcgowan on 23 with considerations for surgery post pulmonary work up, referral to PT for strengthening and balance.   Denies any falls; but unsteady and unsure of herself when ambulating.  Unable to put on earrings due to the degree of numbness.      ONSET   Ongoing for years.   LOCATION   Neck, right shoulder, jose band numbness   DESCRIPTION:  Constant numbness in the hands.   PAIN LEVELS:   3- 9/10   1ST AM:   No change of pain levels  TIME OF DAY:   No change with time of the day.   BEST:   Unable to determine what causes or reliefs the pain in the neck shoulder.  WORSE:    SLEEP:   LSL is normal.    Pain does not wake her or prevent sleep.   EX PROGRAM/ACTIVITES   No specific ex.   PAST MEDICAL HISTORY:     (+)  Asthma, High blood pressure,  Gout R great toe   Cellulitis jose      (-)  pacemaker, DM, CA, latex allergy, metal implants  SURGICAL HISTORY:  Thyroidectomy, hysterectomy, tonsilectomy, appendectomy        Patient Occupation: retired  Pain  Current pain ratin  Progression: no change    Social Support  Patient lives at: no steps required inside or outside the home.    Hand dominance: right    Diagnostic Tests  MRI studies: abnormal (Impression:   Advanced multilevel cervical spondylosis is present as above, focally severe atrophy C4-5 level where there is listhesis, disc osteophyte complex and facet arthropathy present, with severe spinal canal narrowing and bilateral neuroforaminal s)             Objective          Postural Observations    Additional Postural Observation Details  BP  140/76  HR 72  PO2 95%      Active Range of Motion   Cervical/Thoracic Spine   Cervical    Flexion: 50 degrees   Extension: 25 degrees with pain  Left lateral flexion: 15 degrees with pain  Right lateral flexion: 15 degrees   Left rotation: 45 degrees   Right rotation: 45 degrees     Additional Active Range of Motion Details  Flex crepitus  Ext pain into both shldrs  BSB an dR rotation  with pain on the R shldr area    Strength/Myotome Testing     Left Wrist/Hand      (2nd hand position)   Left  strength (2nd hand position) 12 lbs    Thumb Strength  Key/Lateral Pinch     Trial 1: 11 lbs  Tip/Two-Point Pinch     Trial 1: 9 lbs  Palmar/Three-Point Pinch     Trial 1: 8 lbs    Right Wrist/Hand      (2nd hand position)   Right  strength (2nd hand position) 20 lbs    Thumb Strength   Key/Lateral Pinch     Trial 1: 9 lbs  Tip/Two-Point Pinch     Trial 1: 7 lbs  Palmar/Three-Point Pinch     Trial 1: 6 lbs    Ambulation     Observational Gait   Decreased walking speed, stride length, left step length and right step length.   Left arm swing: decreased  Right arm swing: decreased    Additional Observational Gait Details  Gait without assistive device    Quality of Movement  During Gait     Pelvis    Pelvis (Left): Positive Trendelenburg.   Pelvis (Right): Positive Trendelenburg.     Functional Assessment     Comments  SLS:  Unable to maintain unless use of UEs  Sit <> stand I with use of hands to ascend and descend.    Several rest breaks required with the testing due to SOB.   )2 sats remained 94% or above.            Assessment & Plan     Assessment  Impairments: abnormal coordination, abnormal gait, abnormal muscle firing, abnormal or restricted ROM, activity intolerance, impaired balance, impaired physical strength, lacks appropriate home exercise program, pain with function and safety issue  Functional Limitations: carrying objects, walking, pulling, uncomfortable because of pain, standing and stooping  Assessment details: Lori Sutton is a 73 y.o. female referred to PT due to weakness and increasing incidents of loss of balance with diagnosis of cervical spondlyosis and severe central canal stenosis..  Lori reports no falls over the past year. She does report pain into the right shoulder and neck area and constant numbness in the hands.  She has dropped items due to the numbness and is having difficulty with her dressing and she is now unable to tiffanie earring due to the prior noted.  She was seen in the clinic to day for the initial PT evaluation and treatment.    The ROSS balance test results of 36 place Lori  in a high risk category for falls.   Patient also resents with moderate BLE and BUE/hand weakness.  OPPT is indicated in order to achieve the stated goals, including improved strength, increased steadiness and reducing her risk for falls.    Eval complexity:    hig due to medical history, # areas assessed, decision making and evolving process    Barriers to therapy: asthma with use of inhaler.   Prognosis: good    Goals  Plan Goals: ST weeks     1)  pnt will report pain levels 0-6/10 in the right shldr and neck.      2)  pnt will achieve SLS, both LEs, with  slight UE assist and maintain x's 10 sec     3)  pnt will achieve tandem stance with slight UE assist and maintain x's 15 sec     4)  pnt will achieve sit <> stand x's 5 within a 30 sec test     5)  pnt will complete correct technique for home ex    LTG:  DC     1)  Improve ABC survey with < 40% fear of falls     2)  Tapia balance score =/> 45/56     3)  30 sec sit <> stand test =/> 10x     4)  SLS =/> 20 sec with occassional UE assist     5)  Get up and go test =/< 15 sec      6)  I in final HEP      Plan  Therapy options: will be seen for skilled therapy services  Planned modality interventions: high voltage pulsed current (pain management), thermotherapy (hydrocollator packs) and cryotherapy  Planned therapy interventions: abdominal trunk stabilization, balance/weight-bearing training, postural training, neuromuscular re-education, manual therapy, therapeutic activities, stretching, strengthening, home exercise program, functional ROM exercises and flexibility  Frequency: 2x week  Treatment plan discussed with: patient  Plan details: Duration 90 days.         Timed:         Manual Therapy:         mins  96217;     Therapeutic Exercise:         mins  44756;     Neuromuscular Chetan:    8    mins  90064;    Therapeutic Activity:          mins  81045;     Gait Training:           mins  54797;     Ultrasound:          mins  99449;    Ionto                                   mins   48553  Self Care                       3     mins   95756  Canalith Repos         mins 25253      Un-Timed:  Electrical Stimulation:         mins  03657 ( );  Dry Needling          mins self-pay  Traction          mins 77001  Low Eval          Mins  44496  Mod Eval          Mins  16110  High Eval                       41     Mins  28988  Re-Eval                               mins  31968        Timed Treatment:   11   mins   Total Treatment:     52   mins        PT SIGNATURE: Ida Collins, PT   DATE TREATMENT INITIATED:  3/2/2023    Initial Certification  Certification Period: from  3/2/2023 to  5/31/2023  I certify that the therapy services are furnished while this patient is under my care.  The services outlined above are required by this patient, and will be reviewed every 90 days.     PHYSICIAN: Wilber Mcgowan MD      DATE:     Please sign and return via fax to [phone].. Thank you, [facility].

## 2023-03-03 RX ORDER — LEVOTHYROXINE SODIUM 0.03 MG/1
TABLET ORAL
Qty: 90 TABLET | Refills: 3 | Status: SHIPPED | OUTPATIENT
Start: 2023-03-03

## 2023-03-06 ENCOUNTER — TREATMENT (OUTPATIENT)
Dept: PHYSICAL THERAPY | Facility: CLINIC | Age: 73
End: 2023-03-06
Payer: MEDICARE

## 2023-03-06 DIAGNOSIS — R26.89 BALANCE PROBLEM: ICD-10-CM

## 2023-03-06 DIAGNOSIS — R20.2 NUMBNESS AND TINGLING IN BOTH HANDS: ICD-10-CM

## 2023-03-06 DIAGNOSIS — R20.0 NUMBNESS AND TINGLING IN BOTH HANDS: ICD-10-CM

## 2023-03-06 DIAGNOSIS — M48.02 CERVICAL STENOSIS OF SPINAL CANAL: ICD-10-CM

## 2023-03-06 DIAGNOSIS — M54.2 PAIN, NECK: Primary | ICD-10-CM

## 2023-03-06 PROCEDURE — 97112 NEUROMUSCULAR REEDUCATION: CPT | Performed by: PHYSICAL THERAPIST

## 2023-03-06 PROCEDURE — 97110 THERAPEUTIC EXERCISES: CPT | Performed by: PHYSICAL THERAPIST

## 2023-03-06 NOTE — PROGRESS NOTES
Physical Therapy Daily Treatment Note  [id number] [facility] [place] [address]      Patient: Lori Sutton  : 1950  Referring Practitioner: Wilber Mcgowan MD  Date of Initial Visit: Type: THERAPY  Noted: 3/2/2023  Today's Date: 3/6/2023  Patient seen for: 2 sessions      Visit Diagnoses:     ICD-10-CM ICD-9-CM   1. Pain, neck  M54.2 723.1   2. Cervical stenosis of spinal canal  M48.02 723.0   3. Numbness and tingling in both hands  R20.0 782.0    R20.2    4. Balance problem  R26.89 781.99       Subjective   Lori Sutton reports no soreness from the eval date or change of symptoms.  Neck pain more on the right today; mostly N/T into the hands     Pain Level (0-10): 4    Objective   Several rest breaks taken due to asthma/breathing and bathroom breaks   See Exercise, Manual, and Modality Logs for complete treatment.     Patient Education:  Cont with ex for balance and SLR at home.  No handouts today.     Assessment/Plan  Lori was able to complete today's session without reproduction or increase of her symptoms.  She does fatigue quickly and will require several rest breaks.      Progress per Plan of Care          Timed:         Manual Therapy:    5     mins  19376;     Therapeutic Exercise:    16     mins  71196;     Neuromuscular Chetan:    12    mins  61392;    Therapeutic Activity:          mins  55604;     Gait Training:           mins  38998;     Ultrasound:          mins  50903;    Ionto                                   mins   92939  Self Care                            mins   15724      Un-Timed:  Electrical Stimulation:         mins  77930 ( );  Traction          mins 84308    No Charge:   Cryopack:        mins  Hydrocollator MHP:         mins      Timed Treatment:   36   mins   Total Treatment:     46   mins              Ida Collins PT    Physical Therapist

## 2023-03-08 ENCOUNTER — TREATMENT (OUTPATIENT)
Dept: PHYSICAL THERAPY | Facility: CLINIC | Age: 73
End: 2023-03-08
Payer: MEDICARE

## 2023-03-08 DIAGNOSIS — M54.2 PAIN, NECK: Primary | ICD-10-CM

## 2023-03-08 DIAGNOSIS — R20.2 NUMBNESS AND TINGLING IN BOTH HANDS: ICD-10-CM

## 2023-03-08 DIAGNOSIS — M48.02 CERVICAL STENOSIS OF SPINAL CANAL: ICD-10-CM

## 2023-03-08 DIAGNOSIS — R20.0 NUMBNESS AND TINGLING IN BOTH HANDS: ICD-10-CM

## 2023-03-08 DIAGNOSIS — R26.89 BALANCE PROBLEM: ICD-10-CM

## 2023-03-08 PROCEDURE — 97112 NEUROMUSCULAR REEDUCATION: CPT | Performed by: PHYSICAL THERAPIST

## 2023-03-08 PROCEDURE — 97110 THERAPEUTIC EXERCISES: CPT | Performed by: PHYSICAL THERAPIST

## 2023-03-08 PROCEDURE — 97140 MANUAL THERAPY 1/> REGIONS: CPT | Performed by: PHYSICAL THERAPIST

## 2023-03-08 NOTE — PROGRESS NOTES
Physical Therapy Daily Treatment Note  [id number] [place] [address]      Patient: Lori Sutton  : 1950  Referring Practitioner: Wilber Mcgowan MD  Date of Initial Visit: Type: THERAPY  Noted: 3/2/2023  Today's Date: 3/8/2023  Patient seen for: 3 sessions      Visit Diagnoses:     ICD-10-CM ICD-9-CM   1. Pain, neck  M54.2 723.1   2. Cervical stenosis of spinal canal  M48.02 723.0   3. Numbness and tingling in both hands  R20.0 782.0    R20.2    4. Balance problem  R26.89 781.99       Subjective   Lori Sutton reports that she was sore the next day after the last session, including the legs and lower back.      Pain Level (0-10): 6    Objective   Balance work I standing on solid and unlevel surfaces.  Static positioning.  LE and DNF strengthening completed in sitting and on table.   See Exercise, Manual, and Modality Logs for complete treatment.   Several rests breaks required in standing due to fatigue. She also would need to sit from supine ex due to episodes of coughing.     Patient Education: handout provided for home ex     Assessment/Plan  Lori able to complete the ex today with continued noted fatigue.  Ex completed in supine and side lying also requires rest in the upright/seated position.   She also noted some lumbar soreness post the last session and during today's LAQ in unsupported position     Progress per Plan of Care  LAQ in supported sit position.           Timed:         Manual Therapy:    9     mins  79390;     Therapeutic Exercise:    19     mins  29506;     Neuromuscular Chetan:    12    mins  47631;    Therapeutic Activity:          mins  81647;     Gait Training:           mins  51851;     Ultrasound:          mins  00056;    Ionto                                   mins   79949  Self Care                            mins   51407      Un-Timed:  Electrical Stimulation:         mins  88968 ( );  Traction          mins 21481    No Charge:   Cryopack:         mins  Hydrocollator MHP:         mins      Timed Treatment:   40   mins   Total Treatment:     40   mins              Ida Collins, PT    Physical Therapist

## 2023-03-13 ENCOUNTER — TREATMENT (OUTPATIENT)
Dept: PHYSICAL THERAPY | Facility: CLINIC | Age: 73
End: 2023-03-13
Payer: MEDICARE

## 2023-03-13 DIAGNOSIS — R26.89 BALANCE PROBLEM: Primary | ICD-10-CM

## 2023-03-13 DIAGNOSIS — M48.02 CERVICAL STENOSIS OF SPINAL CANAL: ICD-10-CM

## 2023-03-13 DIAGNOSIS — M54.2 PAIN, NECK: ICD-10-CM

## 2023-03-13 PROCEDURE — 97112 NEUROMUSCULAR REEDUCATION: CPT | Performed by: PHYSICAL THERAPIST

## 2023-03-13 PROCEDURE — 97110 THERAPEUTIC EXERCISES: CPT | Performed by: PHYSICAL THERAPIST

## 2023-03-13 PROCEDURE — 97140 MANUAL THERAPY 1/> REGIONS: CPT | Performed by: PHYSICAL THERAPIST

## 2023-03-13 NOTE — PROGRESS NOTES
Physical Therapy Daily Treatment Note  [address] [place] [address]      Patient: Lori Sutton  : 1950  Referring Practitioner: Wilber Mcgowan MD  Date of Initial Visit: Type: THERAPY  Noted: 3/2/2023  Today's Date: 3/13/2023  Patient seen for: 4 sessions      Visit Diagnoses:     ICD-10-CM ICD-9-CM   1. Balance problem  R26.89 781.99   2. Cervical stenosis of spinal canal  M48.02 723.0   3. Pain, neck  M54.2 723.1       Subjective   Lori Sutton reports that her back did hurt her again after the last session.  States that she ordered a heating pad that would cover neck to the lower back area.   Doing ex at home.   R leg is weaker.       Pain Level (0-10): 5 right lumbar area.      Objective   Balance work with gait belt on.  Solid and foam surfaces; including static and dynamic movements.  Strengthening for BLEs completed with client seated in a chair with a back support.    , PO2 93% post lateral cones step overs.   See Exercise, Manual, and Modality Logs for complete treatment.     Patient Education: no change of HEP    Assessment/Plan  Lori was able to complete more of the standing ex with fewer rest breaks.   Strengthening ex adjusted to seated with back support only due to lumbar and posterior R hip pain after therapy sessions.   R hip flexion requires assist to complete full motion.     Other  If lumbar pain no worse after today's visit, then add HL with elevation.           Timed:         Manual Therapy:     9    mins  41829;     Therapeutic Exercise:    20     mins  45973;     Neuromuscular Chetan:    13    mins  00838;    Therapeutic Activity:     6     mins  85766;     Gait Training:           mins  44299;     Ultrasound:          mins  94335;    Ionto                                   mins   21914  Self Care                            mins   84795      Un-Timed:  Electrical Stimulation:         mins  12407 (MC );  Traction          mins 70095    No  Charge:   Cryopack:        mins  Hydrocollator MHP:        mins      Timed Treatment:   48   mins   Total Treatment:     48   mins              Ida Collins PT    Physical Therapist

## 2023-03-15 ENCOUNTER — TREATMENT (OUTPATIENT)
Dept: PHYSICAL THERAPY | Facility: CLINIC | Age: 73
End: 2023-03-15
Payer: MEDICARE

## 2023-03-15 ENCOUNTER — TELEPHONE (OUTPATIENT)
Dept: PHYSICAL THERAPY | Facility: CLINIC | Age: 73
End: 2023-03-15

## 2023-03-15 DIAGNOSIS — M54.2 PAIN, NECK: ICD-10-CM

## 2023-03-15 DIAGNOSIS — R20.2 NUMBNESS AND TINGLING IN BOTH HANDS: ICD-10-CM

## 2023-03-15 DIAGNOSIS — R26.89 BALANCE PROBLEM: Primary | ICD-10-CM

## 2023-03-15 DIAGNOSIS — M48.02 CERVICAL STENOSIS OF SPINAL CANAL: ICD-10-CM

## 2023-03-15 DIAGNOSIS — R20.0 NUMBNESS AND TINGLING IN BOTH HANDS: ICD-10-CM

## 2023-03-15 PROCEDURE — 97112 NEUROMUSCULAR REEDUCATION: CPT | Performed by: PHYSICAL THERAPIST

## 2023-03-15 PROCEDURE — 97140 MANUAL THERAPY 1/> REGIONS: CPT | Performed by: PHYSICAL THERAPIST

## 2023-03-15 PROCEDURE — 97110 THERAPEUTIC EXERCISES: CPT | Performed by: PHYSICAL THERAPIST

## 2023-03-15 NOTE — PROGRESS NOTES
Physical Therapy Daily Treatment Note  [address]    [address] [place] [address]      Patient: Lori Sutton  : 1950  Referring Practitioner: Wilber Mcgowan MD  Date of Initial Visit: Type: THERAPY  Noted: 3/2/2023  Today's Date: 3/15/2023  Patient seen for: 5 sessions      Visit Diagnoses:     ICD-10-CM ICD-9-CM   1. Balance problem  R26.89 781.99   2. Cervical stenosis of spinal canal  M48.02 723.0   3. Pain, neck  M54.2 723.1   4. Numbness and tingling in both hands  R20.0 782.0    R20.2        Subjective   Lori Sutton reports that her back was less painful after the last session.  Notes that during standing R hip flexion there was slight increased pain into the R posterior hip.   Some mm soreness in the thigh mm after the last session.    Pain Level (0-10): 4    Objective   Treatment focus on LE strengthening in standing and seated ( with back support) and balance work.   See Exercise, Manual, and Modality Logs for complete treatment.     Patient Education: no change of home ex.     Assessment/Plan  Lori able to tolerate increased standing work today with 1 rest break during the standing work.   Seated LE ex completed with the back supported again today.  IF client remains with little lumbar pain, will add UE strengthening next visit.    Progress per Plan of Care          Timed:         Manual Therapy:    11     mins  04442;     Therapeutic Exercise:    18     mins  17836;     Neuromuscular Chetan:    12    mins  09877;    Therapeutic Activity:     5     mins  24288;     Gait Training:           mins  93144;     Ultrasound:          mins  70107;    Ionto                                   mins   05762  Self Care                            mins   28265      Un-Timed:  Electrical Stimulation:         mins  51448 (MC );  Traction          mins 21582    No Charge:   Cryopack:        mins  Hydrocollator MHP:         mins      Timed Treatment:   46   mins   Total Treatment:     46    ruby Collins, PT    Physical Therapist

## 2023-03-15 NOTE — TELEPHONE ENCOUNTER
LVM FOR PATIENT TO CALL BACK TO SEE IF SHE CAN COME IN A LITTLE EARLY TODAY FOR HER PT APPT WITH [facility]. (SHE IS SCHEDULED FOR TODAY 0315/2023 AT 130PM).

## 2023-03-21 ENCOUNTER — TELEPHONE (OUTPATIENT)
Dept: FAMILY MEDICINE CLINIC | Facility: CLINIC | Age: 73
End: 2023-03-21
Payer: MEDICARE

## 2023-03-21 NOTE — TELEPHONE ENCOUNTER
Left detailed message on patient's voice mail at 4:08pm.    HUB TO SHARE    Lisinopril in a message from 7/22/2020

## 2023-03-21 NOTE — TELEPHONE ENCOUNTER
Caller: Lori Sutton SAMANTHA    Relationship: Self    Best call back number: [phone]    What is the best time to reach you: ANYTIME    Who are you requesting to speak with (clinical staff, provider,  specific staff member): CLINICAL STAFF    Do you know the name of the person who called: PATIENT     What was the call regarding: PATIENT IS WANTING TO SEE WHAT BLOOD PRESSURE MEDICATION SHE TOOK IN THE PAST THAT CAUSED HER TO COUGH AND TO BE TAKEN OFF OF IT    Do you require a callback: YES

## 2023-03-23 ENCOUNTER — TREATMENT (OUTPATIENT)
Dept: PHYSICAL THERAPY | Facility: CLINIC | Age: 73
End: 2023-03-23
Payer: MEDICARE

## 2023-03-23 DIAGNOSIS — R20.2 NUMBNESS AND TINGLING IN BOTH HANDS: ICD-10-CM

## 2023-03-23 DIAGNOSIS — R26.89 BALANCE PROBLEM: Primary | ICD-10-CM

## 2023-03-23 DIAGNOSIS — M48.02 CERVICAL STENOSIS OF SPINAL CANAL: ICD-10-CM

## 2023-03-23 DIAGNOSIS — M54.2 PAIN, NECK: ICD-10-CM

## 2023-03-23 DIAGNOSIS — R20.0 NUMBNESS AND TINGLING IN BOTH HANDS: ICD-10-CM

## 2023-03-23 PROCEDURE — G0283 ELEC STIM OTHER THAN WOUND: HCPCS | Performed by: PHYSICAL THERAPIST

## 2023-03-23 PROCEDURE — 97110 THERAPEUTIC EXERCISES: CPT | Performed by: PHYSICAL THERAPIST

## 2023-03-23 PROCEDURE — 97112 NEUROMUSCULAR REEDUCATION: CPT | Performed by: PHYSICAL THERAPIST

## 2023-03-23 PROCEDURE — 97140 MANUAL THERAPY 1/> REGIONS: CPT | Performed by: PHYSICAL THERAPIST

## 2023-03-23 NOTE — PROGRESS NOTES
Physical Therapy Daily Treatment Note  [id number] [facility] [place] [address]      Patient: Lori Sutton  : 1950  Referring Practitioner: Wilber Mcgowan MD  Date of Initial Visit: Type: THERAPY  Noted: 3/2/2023  Today's Date: 3/23/2023  Patient seen for: 6 sessions      Visit Diagnoses:     ICD-10-CM ICD-9-CM   1. Balance problem  R26.89 781.99   2. Cervical stenosis of spinal canal  M48.02 723.0   3. Pain, neck  M54.2 723.1   4. Numbness and tingling in both hands  R20.0 782.0    R20.2        Subjective   Lori Sutton reports no new c/os.  States that she is interested in using/purchasing a TENS unit for home usage vs taking pain meds.      Pain Level (0-10): 4    Objective   UE ex added today with caution of the R shldr.  No pain with the ex.  See Exercise, Manual, and Modality Logs for complete treatment.   IFC/MHP @ end of session.( in sitting)  Patient Education:  Cont with current home ex.     Assessment/Plan  Lori able to complete the band UE ex wo greater cervical or shldr pain.  She has improved with her tolerance to standing tasks.  Today she did state interest in a home TENS unit; thus IFC usage today for pain relief.     Progress per Plan of Care cont as tolerated for strength, balance and pain reduction.   Consider contacting TENS rep if pnt responds with the IFC          Timed:         Manual Therapy:    9     mins  25024;     Therapeutic Exercise:    18     mins  39793;     Neuromuscular Chetan:    10    mins  50321;    Therapeutic Activity:          mins  29497;     Gait Training:           mins  97589;     Ultrasound:          mins  64482;    Ionto                                   mins   91230  Self Care                       2     mins   63477      Un-Timed:  Electrical Stimulation:    15     mins  62120 (MC );  Traction          mins 92018      Timed Treatment:   39   mins   Total Treatment:     54   mins              Ida Collins PT    Physical  Therapist

## 2023-03-27 ENCOUNTER — TREATMENT (OUTPATIENT)
Dept: PHYSICAL THERAPY | Facility: CLINIC | Age: 73
End: 2023-03-27
Payer: MEDICARE

## 2023-03-27 DIAGNOSIS — R20.0 NUMBNESS AND TINGLING IN BOTH HANDS: ICD-10-CM

## 2023-03-27 DIAGNOSIS — M79.604 PAIN OF RIGHT LOWER EXTREMITY: ICD-10-CM

## 2023-03-27 DIAGNOSIS — M48.02 CERVICAL STENOSIS OF SPINAL CANAL: ICD-10-CM

## 2023-03-27 DIAGNOSIS — T14.8XXA PULLED MUSCLE: ICD-10-CM

## 2023-03-27 DIAGNOSIS — R20.2 NUMBNESS AND TINGLING IN BOTH HANDS: ICD-10-CM

## 2023-03-27 DIAGNOSIS — R26.89 BALANCE PROBLEM: Primary | ICD-10-CM

## 2023-03-27 DIAGNOSIS — M79.606 PAIN OF LOWER EXTREMITY, UNSPECIFIED LATERALITY: ICD-10-CM

## 2023-03-27 DIAGNOSIS — M54.2 PAIN, NECK: ICD-10-CM

## 2023-03-27 PROCEDURE — 97110 THERAPEUTIC EXERCISES: CPT | Performed by: PHYSICAL THERAPIST

## 2023-03-27 PROCEDURE — G0283 ELEC STIM OTHER THAN WOUND: HCPCS | Performed by: PHYSICAL THERAPIST

## 2023-03-27 PROCEDURE — 97112 NEUROMUSCULAR REEDUCATION: CPT | Performed by: PHYSICAL THERAPIST

## 2023-03-27 NOTE — PROGRESS NOTES
Physical Therapy Daily Treatment Note  [address] [place] [address]      Patient: Lori Sutton  : 1950  Referring Practitioner: Wilber Mcgowan MD  Date of Initial Visit: Type: THERAPY  Noted: 3/2/2023  Today's Date: 3/27/2023  Patient seen for: 7 sessions      Visit Diagnoses:     ICD-10-CM ICD-9-CM   1. Balance problem  R26.89 781.99   2. Cervical stenosis of spinal canal  M48.02 723.0   3. Pain, neck  M54.2 723.1   4. Numbness and tingling in both hands  R20.0 782.0    R20.2    5. Pain of right lower extremity  M79.604 729.5   6. Pain of lower extremity, unspecified laterality  M79.606 729.5   7. Pulled muscle  T14.8XXA 848.9       Subjective   Lori Sutton reports: she really enjoyed the e-stim from the last session, had no issues in the shoulder following. Has little complaints as far as pain is concerned. Does hope that therapy is helping with balance/stability, but is not sure if it is.     Pain Level (0-10): 0    Objective     See Exercise, Manual, and Modality Logs for complete treatment.     Patient Education: nothing to add today, consistency with performing ex 2-3 x's/week at home.     Observations: gait within the clinic is with near equal stance and stride length jose (but is reduced overall) with increasing gait speed.     Assessment & Plan     Assessment    Assessment details: therex as per flow sheet for UE and LE Strengthening to allow for confidence and ease with I/ADL's and reduced pain. Requires 1 seated rest during session and is demonstrating increasing tolerance to continuous activity. Will likely do well with standing UE challenges at next sessions.       Progress per Plan of Care and Progress strengthening /stabilization /functional activity          Timed:         Manual Therapy:         mins  72194;     Therapeutic Exercise:    20     mins  10431;     Neuromuscular Chetan:    10    mins  70166;    Therapeutic Activity:          mins  03733;     Gait  Training:           mins  71546;     Ultrasound:          mins  34417;    Ionto                                   mins   71690  Self Care                            mins   09469      Un-Timed:  Electrical Stimulation:   15      mins  09314 ( );  Traction          mins 94360    No Charge:   Cryopack:   15     mins  Hydrocollator MHP:         mins      Timed Treatment:   45   mins   Total Treatment:     45   mins      Paulina Tiwari PTA  Physical Therapist Assistant  [place] License: [id number]

## 2023-03-30 ENCOUNTER — TREATMENT (OUTPATIENT)
Dept: PHYSICAL THERAPY | Facility: CLINIC | Age: 73
End: 2023-03-30
Payer: MEDICARE

## 2023-03-30 DIAGNOSIS — R20.0 NUMBNESS AND TINGLING IN BOTH HANDS: ICD-10-CM

## 2023-03-30 DIAGNOSIS — M48.02 CERVICAL STENOSIS OF SPINAL CANAL: ICD-10-CM

## 2023-03-30 DIAGNOSIS — R20.2 NUMBNESS AND TINGLING IN BOTH HANDS: ICD-10-CM

## 2023-03-30 DIAGNOSIS — M54.2 PAIN, NECK: ICD-10-CM

## 2023-03-30 DIAGNOSIS — R26.89 BALANCE PROBLEM: Primary | ICD-10-CM

## 2023-03-30 PROCEDURE — 97110 THERAPEUTIC EXERCISES: CPT | Performed by: PHYSICAL THERAPIST

## 2023-03-30 PROCEDURE — 97112 NEUROMUSCULAR REEDUCATION: CPT | Performed by: PHYSICAL THERAPIST

## 2023-03-30 NOTE — PROGRESS NOTES
Physical Therapy Daily Treatment Note/ Progress Note  [address] [place] [address]    Patient: Lori Sutton  : 1950  Referring practitioner: Wilber Mcgowan MD  Date of Initial Visit: Type: THERAPY  Noted: 3/2/2023  Today's Date: 3/30/2023  Patient seen for 8 sessions      Visit Diagnoses:    ICD-10-CM ICD-9-CM   1. Balance problem  R26.89 781.99   2. Cervical stenosis of spinal canal  M48.02 723.0   3. Pain, neck  M54.2 723.1   4. Numbness and tingling in both hands  R20.0 782.0    R20.2        VISIT#: 8    Subjective   Lori Sutton reports that she had been feeling like therapy had been helpful but yesterday she noticed an increased in stiffness and pain in her neck and shoulder. She states that she is still dealing with numbness and tingling in her hands that has remained unchanged but she feels like the numbness in her legs is worse.  She states that her legs feel weaker and she is having more unsteadiness.  She denies any falls but states that she came close to falling in her dining room because her feet got tangled.  She is anticipating that she will need surgery.  She is scheduled to see Dr. Mcgowan in one week to discuss the next steps.  Pain Rating (0-10): 2/10   Neck Disability Index:12/50, previously 11/50     ROSS balance: 35/56, previously 36/56     Objective          Static Posture   General Observations  Guarded.     Postural Observations  Seated posture: fair    Additional Postural Observation Details        Active Range of Motion   Cervical/Thoracic Spine   Cervical    Flexion: 35 degrees with pain  Extension: 18 degrees with pain  Left lateral flexion: 20 degrees with pain  Right lateral flexion: 12 degrees with pain  Left rotation: 40 degrees with pain  Right rotation: 40 degrees with pain    Strength/Myotome Testing     Left Shoulder     Planes of Motion   Flexion: 4   Abduction: 4     Right Shoulder     Planes of Motion   Flexion: 4   Abduction: 4     Left  Elbow   Flexion: 4+  Extension: 4    Right Elbow   Flexion: 4+  Extension: 4    Left Wrist/Hand   Wrist extension: 4     (2nd hand position)     Trial 1: 26.4 lbs    Trial 2: 26.8 lbs    Trial 3: 25.2 lbs    Average: 26.13 lbs    Thumb Strength  Key/Lateral Pinch     Trial 1: 10 lbs    Trial 2: 10 lbs    Trial 3: 11 lbs    Average: 10.33 lbs    Right Wrist/Hand   Wrist extension: 4     (2nd hand position)     Trial 1: 28.2 lbs    Trial 2: 30.2 lbs    Trial 3: 34 lbs    Average: 30.8 lbs    Thumb Strength   Key/Lateral Pinch     Trial 1: 11 lbs    Trial 2: 10 lbs    Trial 3: 11 lbs    Average: 10.67 lbs    Ambulation   Weight-Bearing Status   Assistive device used: none    Observational Gait   Decreased walking speed, stride length, left step length and right step length.   Left arm swing: decreased  Right arm swing: decreased    Quality of Movement During Gait     Pelvis    Pelvis (Left): Positive Trendelenburg.   Pelvis (Right): Positive Trendelenburg.     Functional Assessment     Comments  SLS:  Unable to maintain unless use of UEs  Sit <> stand I with use of hands to ascend and descend.         See Exercise, Manual, and Modality Logs for complete treatment.     Patient Education: Discussed folding cane for balance and safety    Assessment & Plan     Assessment  Impairments: abnormal coordination, abnormal gait, abnormal muscle firing, abnormal or restricted ROM, activity intolerance, impaired balance, impaired physical strength, lacks appropriate home exercise program, pain with function and safety issue  Functional Limitations: carrying objects, walking, pulling, uncomfortable because of pain, standing and stooping  Assessment details: Lori Sutton is a 73 y.o. female referred to PT due to weakness and increasing incidents of loss of balance with diagnosis of cervical spondlyosis and severe central canal stenosis.  She has attended 8 visits to date.  She is demonstrating improved overall strength and  balance but she continues to be a moderate fall risk with a ROSS balance score of 35/56.  She still reports no falls but does subjectively feel like her balance is worsening. She does report continued pain into the right shoulder and neck area and constant numbness in the hands.  She has dropped items due to the numbness and is having difficulty with her dressing and she is unable to tiffanie earring due to the prior noted.  Patient also resents with continued BLE and BUE/hand weakness.  Outpatient physical therapy is indicated in order to achieve the stated goals, including improved strength, increased steadiness and reducing her risk for falls.    Barriers to therapy: asthma with use of inhaler  Prognosis: good    Goals  Plan Goals: ST weeks     1)  pnt will report pain levels 0-6/10 in the right shldr and neck. - MET     2)  pnt will achieve SLS, both LEs, with slight UE assist and maintain x's 10 sec- PARTIALLY MET     3)  pnt will achieve tandem stance with slight UE assist and maintain x's 15 sec- PARTIALLY MET     4)  pnt will achieve sit <> stand x's 5 within a 30 sec test- PARTIALLY MET     5)  pnt will complete correct technique for home ex- PARTIALLY MET    LTG:  DC     1)  Improve ABC survey with < 40% fear of falls- NOT MET     2)  Ross balance score =/> 45/56- NOT MET     3)  30 sec sit <> stand test =/> 10x- NOT MET     4)  SLS =/> 20 sec with occassional UE assist- NOT MET     5)  Get up and go test =/< 15 sec - NOT MET     6)  I in final HEP- NOT MET      Plan  Therapy options: will be seen for skilled therapy services  Planned modality interventions: high voltage pulsed current (pain management), thermotherapy (hydrocollator packs) and cryotherapy  Planned therapy interventions: abdominal trunk stabilization, balance/weight-bearing training, postural training, neuromuscular re-education, manual therapy, therapeutic activities, stretching, strengthening, home exercise program, functional ROM  exercises and flexibility  Frequency: 2x week  Duration in visits: 16  Duration in weeks: 8  Treatment plan discussed with: patient            Timed:         Manual Therapy:         mins  18345;     Therapeutic Exercise:    23     mins  68527;     Neuromuscular Chetan:    15    mins  39392;    Therapeutic Activity:          mins  45314;     Gait Training:           mins  75528;     Ultrasound:          mins  19908;    Ionto                                   mins   59815  Self Care                            mins   20589  Canalith Repos                   mins  70205    Un-Timed:  Electrical Stimulation:         mins  20357 ( );  Dry Needling          mins 93798/20561  Traction          mins 84029  Re-Eval                               mins  50011    Timed Treatment:   38   mins   Total Treatment:     38   mins        Cassandra Coker PT  Physical Therapist  [place] License: [id number]

## 2023-04-03 ENCOUNTER — TREATMENT (OUTPATIENT)
Dept: PHYSICAL THERAPY | Facility: CLINIC | Age: 73
End: 2023-04-03
Payer: MEDICARE

## 2023-04-03 DIAGNOSIS — R26.89 BALANCE PROBLEM: Primary | ICD-10-CM

## 2023-04-03 PROCEDURE — 97110 THERAPEUTIC EXERCISES: CPT | Performed by: PHYSICAL THERAPIST

## 2023-04-03 NOTE — PROGRESS NOTES
Physical Therapy Daily Treatment Note  [address] [place] [address]      Patient: Lori Sutton  : 1950  Referring Practitioner: Wilber Mcgowan MD  Date of Initial Visit: Type: THERAPY  Noted: 3/2/2023  Today's Date: 4/3/2023  Patient seen for: 9 sessions      Visit Diagnoses:     ICD-10-CM ICD-9-CM   1. Balance problem  R26.89 781.99       Subjective   Lori Sutton reports: that she isn't having much pain today, primarily has numbness of her hands and and in her legs.     Pain Level (0-10): 0    Objective     See Exercise, Manual, and Modality Logs for complete treatment.     Patient Education: cues for form and exs.      Assessment & Plan     Assessment    Assessment details: Lori continues to demonstrate difficulty with balance and stability during standing balance challenges. She is able to perform seated and standing therex with less noted fatigue and without pain today.       Progress per Plan of Care and Other          Timed:         Manual Therapy:         mins  55816;     Therapeutic Exercise:    23     mins  94238;     Neuromuscular Chetan:    8    mins  51583;    Therapeutic Activity:          mins  32856;     Gait Training:           mins  92688;     Ultrasound:          mins  70828;    Ionto                                   mins   64960  Self Care                            mins   78038      Un-Timed:  Electrical Stimulation:         mins  66607 ( );  Traction          mins 89227    No Charge:   Cryopack:        mins  Hydrocollator MHP:         mins      Timed Treatment:   31   mins   Total Treatment:     31   mins      Paulina Tiwari PTA  Physical Therapist Assistant  [place] License: [id number]

## 2023-04-03 NOTE — PROGRESS NOTES
Neurosurgical Consultation      Lori SINGH Lesley is a 73 y.o. female is here today for follow-up for neck pain. Today patient reports continued neck pain with numbness in bilateral hands and feet.    Chief Complaint   Patient presents with   • Neck Pain     Follow up         Previous treatment: Physical Therapy/ 9 visits    HPI: This is a 73-year-old woman with a BMI of 33 as well as significant lung disease who has had a prior thyroidectomy and was a chronic everyday smoker at 2 packs/day for greater than 40 years.  She does comment that when she was approximately 18 years of age she was offered neck surgery however she turned this down.  She did get pain relief with physical therapy 55 years ago.  She has had greater than 5 years of progressive numbness in her hands and feet.  She has had progressive worsening of her dexterity as well as difficulty signing her name, buttoning a button as well as picking up small items off of the table.  She drops items on a regular basis.  She has balance issues especially proprioceptive issues that are worsened on uneven ground and in the dark.  I last evaluated her on February 23, 2023.  Since that time she has been working with physical therapy.  She has had significant improvement in her shoulder pain as well as possible C5 radiculopathy.  She has not had any improvement in her cervical myelopathy.    Past Medical History:   Diagnosis Date   • Fibrocystic breast    • Hyperlipidemia    • Hypertension         Past Surgical History:   Procedure Laterality Date   • APPENDECTOMY  1958   • AUGMENTATION MAMMAPLASTY     • BREAST CYST ASPIRATION     • BREAST CYST EXCISION     • HYSTERECTOMY     • TONSILLECTOMY  1958        Current Outpatient Medications on File Prior to Visit   Medication Sig Dispense Refill   • ALBUTEROL SULFATE  (90 Base) MCG/ACT inhaler INHALE 2 PUFFS BY MOUTH EVERY 4 HOURS AS NEEDED FOR WHEEZING 42.5 g 2   • allopurinol (ZYLOPRIM) 300 MG tablet TAKE 1 TABLET  BY MOUTH  DAILY 90 tablet 3   • atorvastatin (LIPITOR) 20 MG tablet TAKE 1 TABLET BY MOUTH  DAILY 90 tablet 3   • Cholecalciferol 1000 units tablet VITAMIN D TABS     • citalopram (CeleXA) 20 MG tablet TAKE 1 TABLET BY MOUTH  DAILY 90 tablet 3   • furosemide (LASIX) 40 MG tablet Take 1 tablet by mouth Daily for 90 days. 90 tablet 2   • l-methylfolate-algae-B12-B6 3-90.314-2-35 MG capsule capsule Take 1 capsule by mouth 2 (Two) Times a Day. 180 capsule 3   • levothyroxine (SYNTHROID, LEVOTHROID) 25 MCG tablet TAKE 1 TABLET BY MOUTH  DAILY 90 tablet 3   • metoprolol succinate XL (TOPROL-XL) 100 MG 24 hr tablet TAKE 1 TABLET BY MOUTH  DAILY 90 tablet 3   • montelukast (SINGULAIR) 10 MG tablet TAKE 1 TABLET BY MOUTH  DAILY 90 tablet 3   • omeprazole (priLOSEC) 40 MG capsule TAKE 1 CAPSULE BY MOUTH  DAILY 90 capsule 3   • potassium chloride (K-DUR,KLOR-CON) 10 MEQ CR tablet      • potassium chloride 10 MEQ CR tablet Take 2 tablets by mouth Daily for 90 days. Use on days that furosemide is taken. 180 tablet 2   • promethazine (PHENERGAN) 25 MG tablet Take 1 tablet by mouth Every 8 (Eight) Hours As Needed for Nausea or Vomiting. 10 tablet 1   • solifenacin (VESICARE) 5 MG tablet Take 1 tablet by mouth Every 12 (Twelve) Hours.     • Symbicort 80-4.5 MCG/ACT inhaler INHALE 2 INHALATIONS BY  MOUTH TWICE DAILY 10.2 g 11   • triamcinolone (KENALOG) 0.5 % ointment APPLY TOPICALLY TO THE  APPROPRIATE AREA AS  DIRECTED TWICE DAILY 90 g 1   • valsartan-hydrochlorothiazide (DIOVAN-HCT) 320-25 MG per tablet TAKE 1 TABLET BY MOUTH  DAILY 90 tablet 3   • vitamin E 100 UNIT capsule VITAMIN E CAPS     • fluorometholone (FML) 0.1 % ophthalmic suspension      • hydrALAZINE (APRESOLINE) 50 MG tablet Take 1 tablet by mouth 3 (Three) Times a Day for 30 days. 90 tablet 11   • ofloxacin (OCUFLOX) 0.3 % ophthalmic solution      • Prolensa 0.07 % solution      • triamcinolone (KENALOG) 0.1 % ointment APPLY TOPICALLY TO LOWER LEG EVERY DAY    "    No current facility-administered medications on file prior to visit.        Allergies   Allergen Reactions   • Nsaids GI Bleeding        Social History     Socioeconomic History   • Marital status:    Tobacco Use   • Smoking status: Former     Packs/day: 1.50     Years: 40.00     Pack years: 60.00     Types: Cigarettes     Start date: 1965     Quit date: 2006     Years since quittin.2   • Smokeless tobacco: Never   Vaping Use   • Vaping Use: Never used   Substance and Sexual Activity   • Alcohol use: Yes     Alcohol/week: 11.0 standard drinks     Types: 4 Glasses of wine, 7 Shots of liquor per week     Comment: daily, vodka tonic   • Drug use: No   • Sexual activity: Not Currently     Partners: Male     Birth control/protection: Post-menopausal          Review of Systems   Constitutional: Positive for activity change.   HENT: Negative.    Eyes: Positive for pain and visual disturbance.   Respiratory: Negative.    Cardiovascular: Negative.    Gastrointestinal: Negative.    Endocrine: Negative.    Genitourinary: Negative.    Musculoskeletal: Positive for arthralgias, myalgias, neck pain and neck stiffness.   Neurological: Positive for numbness (tingling).   Hematological: Negative.    Psychiatric/Behavioral: Positive for sleep disturbance.        Physical Examination:     Vitals:    23 1329   BP: 119/78   Pulse: 87   SpO2: 93%   Weight: 82.7 kg (182 lb 6.4 oz)   Height: 157.5 cm (62\")   PainSc:   2        Physical Exam     Neurological Exam   Neurological examination is stable compared to my last evaluation without any new red flag signs.    Result Review  The following data was reviewed by: Wilber Mcgowan MD on 2023:    Data reviewed: Radiologic studies Scoliosis x-rays do not show any significant sagittal imbalance.  It does confirm the presence of the cervical deformity.  Cervical flexion-extension x-rays do not show any dynamic spondylolisthesis at the C4-C5 level but it does " confirm the presence of the relatively significant anterior listhesis at this level.     Assessment/plan:  This is a 73-year-old woman with significant lung disease as well as other comorbidities who presents with progressive cervical myelopathy.  This is life altering for her.  She has reversal of her cervical lordosis with severe nondynamic C4 on C5 spondylolisthesis.  She is working with physical therapy and has had improvement in her neck pain as well as her shoulder pain.  However I do believe the compression or spinal cord is contributing to her progressive cervical myelopathy symptoms.  We will work to see if she can be cleared for surgical intervention.  If so I would likely recommend a C5 corpectomy C4-C6 fusion.  She will follow-up with me in approximately 2 months to reevaluate her clinical status.  I have encouraged her to call with any questions or concerns.  I expressed to her reasons to go to the emergency department on a more urgent basis.  She expresses understanding.    Diagnoses and all orders for this visit:    1. Cervical spondylosis with myelopathy (Primary)    2. Reactive airways dysfunction syndrome  -     Ambulatory Referral to Pulmonology         Return in about 2 months (around 6/6/2023).            Wilber Mcgowan MD

## 2023-04-05 ENCOUNTER — TREATMENT (OUTPATIENT)
Dept: PHYSICAL THERAPY | Facility: CLINIC | Age: 73
End: 2023-04-05
Payer: MEDICARE

## 2023-04-05 DIAGNOSIS — M48.02 CERVICAL STENOSIS OF SPINAL CANAL: ICD-10-CM

## 2023-04-05 DIAGNOSIS — R20.0 NUMBNESS AND TINGLING IN BOTH HANDS: ICD-10-CM

## 2023-04-05 DIAGNOSIS — R26.89 BALANCE PROBLEM: Primary | ICD-10-CM

## 2023-04-05 DIAGNOSIS — M79.604 PAIN OF RIGHT LOWER EXTREMITY: ICD-10-CM

## 2023-04-05 DIAGNOSIS — M79.606 PAIN OF LOWER EXTREMITY, UNSPECIFIED LATERALITY: ICD-10-CM

## 2023-04-05 DIAGNOSIS — R20.2 NUMBNESS AND TINGLING IN BOTH HANDS: ICD-10-CM

## 2023-04-05 DIAGNOSIS — M54.2 PAIN, NECK: ICD-10-CM

## 2023-04-05 PROCEDURE — 97112 NEUROMUSCULAR REEDUCATION: CPT | Performed by: PHYSICAL THERAPIST

## 2023-04-05 PROCEDURE — 97110 THERAPEUTIC EXERCISES: CPT | Performed by: PHYSICAL THERAPIST

## 2023-04-05 NOTE — PROGRESS NOTES
Physical Therapy Daily Treatment Note  [place] [place] [address]      Patient: Lori Sutton  : 1950  Referring Practitioner: Wilber Mcgowan MD  Date of Initial Visit: Type: THERAPY  Noted: 3/2/2023  Today's Date: 2023  Patient seen for: 10 sessions      Visit Diagnoses:     ICD-10-CM ICD-9-CM   1. Balance problem  R26.89 781.99   2. Cervical stenosis of spinal canal  M48.02 723.0   3. Pain, neck  M54.2 723.1   4. Numbness and tingling in both hands  R20.0 782.0    R20.2    5. Pain of right lower extremity  M79.604 729.5   6. Pain of lower extremity, unspecified laterality  M79.606 729.5       Subjective   Lori Sutton reports: she remains fairly pain free, but continues to have concerns in regards to her balance and stability.      Pain Level (0-10): 0    Objective     See Exercise, Manual, and Modality Logs for complete treatment.     Patient Education: progression of HEP at next session pending MD appt.     Assessment & Plan     Assessment    Assessment details: Lori presents to therapy today with noted improvements in endurance and tolerance for standing and continuous activities. Less wheezing noted on exertion. Balance, however, continues to be a challenge when transitioning from surfaces of various heights and firmness, and is even more impaired when removing visual field and/or lessening DYLON. Continue to progress as tolerated and appropriate.       Progress per Plan of Care and Other          Timed:         Manual Therapy:         mins  33111;     Therapeutic Exercise:    18     mins  16114;     Neuromuscular Chetan:   12    mins  28095;    Therapeutic Activity:          mins  97645;     Gait Training:           mins  83210;     Ultrasound:          mins  97989;    Ionto                                   mins   35912  Self Care                            mins   92962      Un-Timed:  Electrical Stimulation:         mins  41134 ( );  Traction          mins  80193    No Charge:   Cryopack:        mins  Hydrocollator MHP:         mins      Timed Treatment:   30   mins   Total Treatment:     30   mins      Paulina Tiwari PTA  Physical Therapist Assistant  [place] License: [id number]

## 2023-04-06 ENCOUNTER — HOSPITAL ENCOUNTER (OUTPATIENT)
Facility: HOSPITAL | Age: 73
Discharge: HOME | End: 2023-04-06
Attending: EMERGENCY MEDICINE | Admitting: EMERGENCY MEDICINE
Payer: MEDICARE

## 2023-04-06 ENCOUNTER — OFFICE VISIT (OUTPATIENT)
Dept: NEUROSURGERY | Facility: CLINIC | Age: 73
End: 2023-04-06
Payer: MEDICARE

## 2023-04-06 VITALS
OXYGEN SATURATION: 96 % | HEIGHT: 62 IN | HEART RATE: 91 BPM | BODY MASS INDEX: 33.8 KG/M2 | DIASTOLIC BLOOD PRESSURE: 55 MMHG | RESPIRATION RATE: 22 BRPM | SYSTOLIC BLOOD PRESSURE: 127 MMHG | TEMPERATURE: 97.6 F | WEIGHT: 183.7 LBS

## 2023-04-06 VITALS
DIASTOLIC BLOOD PRESSURE: 78 MMHG | BODY MASS INDEX: 33.57 KG/M2 | HEIGHT: 62 IN | WEIGHT: 182.4 LBS | OXYGEN SATURATION: 93 % | SYSTOLIC BLOOD PRESSURE: 119 MMHG | HEART RATE: 87 BPM

## 2023-04-06 DIAGNOSIS — M47.12 CERVICAL SPONDYLOSIS WITH MYELOPATHY: Primary | ICD-10-CM

## 2023-04-06 DIAGNOSIS — J68.3 REACTIVE AIRWAYS DYSFUNCTION SYNDROME: ICD-10-CM

## 2023-04-06 DIAGNOSIS — H10.9 CONJUNCTIVITIS OF LEFT EYE, UNSPECIFIED CONJUNCTIVITIS TYPE: Primary | ICD-10-CM

## 2023-04-06 PROCEDURE — 99203 OFFICE O/P NEW LOW 30 MIN: CPT | Performed by: EMERGENCY MEDICINE

## 2023-04-06 PROCEDURE — 99214 OFFICE O/P EST MOD 30 MIN: CPT | Performed by: NEUROLOGICAL SURGERY

## 2023-04-06 PROCEDURE — 3078F DIAST BP <80 MM HG: CPT | Performed by: NEUROLOGICAL SURGERY

## 2023-04-06 PROCEDURE — 3074F SYST BP LT 130 MM HG: CPT | Performed by: NEUROLOGICAL SURGERY

## 2023-04-06 PROCEDURE — G0463 HOSPITAL OUTPT CLINIC VISIT: HCPCS | Performed by: EMERGENCY MEDICINE

## 2023-04-06 RX ORDER — FLUOROMETHOLONE 0.1 %
SUSPENSION, DROPS(FINAL DOSAGE FORM)(ML) OPHTHALMIC (EYE)
COMMUNITY
Start: 2023-03-21

## 2023-04-06 RX ORDER — POLYMYXIN B SULFATE AND TRIMETHOPRIM 1; 10000 MG/ML; [USP'U]/ML
1 SOLUTION OPHTHALMIC EVERY 4 HOURS
Qty: 10 ML | Refills: 0 | Status: SHIPPED | OUTPATIENT
Start: 2023-04-06 | End: 2023-04-13

## 2023-04-06 RX ORDER — OFLOXACIN 3 MG/ML
SOLUTION/ DROPS OPHTHALMIC
COMMUNITY
Start: 2023-03-21

## 2023-04-06 RX ORDER — BROMFENAC SODIUM 0.7 MG/ML
SOLUTION/ DROPS OPHTHALMIC
COMMUNITY
Start: 2023-03-21

## 2023-04-06 NOTE — FSED PROVIDER NOTE
Subjective   History of Present Illness  73-year-old female presents to the emergency room with left eye pain that is burning she reports itching drainage coming out of her eye but denies wearing any contacts denies any headache or neck stiffness denies any rash denies any pain upon extraocular motor movement patient reports he supposed to have surgery in the next few weeks to arise and has a prescription for Ocuflox but has not used it for eye she is supposed to use a perioperative denies any fever or chills denies any cough or congestion denies any chest pain or shortness of breath now in ED for further eval patient was glasses for corrective vision        Review of Systems   Constitutional: Negative.    HENT: Negative.    Eyes: Positive for redness and itching.   Respiratory: Negative.    Cardiovascular: Negative.    Gastrointestinal: Negative.    Endocrine: Negative.    Genitourinary: Negative.    Musculoskeletal: Negative.    Skin: Negative.    Allergic/Immunologic: Negative.    Neurological: Negative.    Hematological: Negative.    Psychiatric/Behavioral: Negative.        Past Medical History:   Diagnosis Date   • Fibrocystic breast    • Hyperlipidemia    • Hypertension        Allergies   Allergen Reactions   • Nsaids GI Bleeding       Past Surgical History:   Procedure Laterality Date   • APPENDECTOMY     • AUGMENTATION MAMMAPLASTY     • BREAST CYST ASPIRATION     • BREAST CYST EXCISION     • HYSTERECTOMY     • TONSILLECTOMY         Family History   Problem Relation Age of Onset   • Pancreatic cancer Mother    • Stroke Father    • Stroke Sister    • Prostate cancer Brother        Social History     Socioeconomic History   • Marital status:    Tobacco Use   • Smoking status: Former     Packs/day: 1.50     Years: 40.00     Pack years: 60.00     Types: Cigarettes     Start date: 1965     Quit date: 2006     Years since quittin.2   • Smokeless tobacco: Never   Vaping Use   • Vaping  Use: Never used   Substance and Sexual Activity   • Alcohol use: Yes     Alcohol/week: 11.0 standard drinks     Types: 4 Glasses of wine, 7 Shots of liquor per week     Comment: daily, vodka tonic   • Drug use: No   • Sexual activity: Not Currently     Partners: Male     Birth control/protection: Post-menopausal           Objective   Physical Exam  Vitals and nursing note reviewed.   Constitutional:       Appearance: Normal appearance.   HENT:      Head: Normocephalic and atraumatic.      Right Ear: Tympanic membrane normal.      Left Ear: Tympanic membrane normal.      Nose: Nose normal.      Mouth/Throat:      Mouth: Mucous membranes are moist.      Pharynx: Oropharynx is clear.   Eyes:      Extraocular Movements: Extraocular movements intact.      Conjunctiva/sclera:      Left eye: Left conjunctiva is injected.      Pupils: Pupils are equal, round, and reactive to light.      Comments: Patient has Carlota limbic sparing injected conjunctiva   Cardiovascular:      Rate and Rhythm: Normal rate and regular rhythm.      Pulses: Normal pulses.      Heart sounds: Normal heart sounds.   Pulmonary:      Effort: Pulmonary effort is normal.      Breath sounds: Normal breath sounds.   Abdominal:      General: Abdomen is flat.      Palpations: Abdomen is soft.   Musculoskeletal:         General: Normal range of motion.      Cervical back: Normal range of motion and neck supple.   Skin:     General: Skin is warm and dry.      Capillary Refill: Capillary refill takes less than 2 seconds.   Neurological:      General: No focal deficit present.      Mental Status: She is alert and oriented to person, place, and time.   Psychiatric:         Mood and Affect: Mood normal.         Behavior: Behavior normal.         Procedures           ED Course                                           Medical Decision Making  Exam findings concerning for conjunctivitis patient be started on Polytrim eyedrops recommended ophthalmology follow-up and  close return precautions patient is otherwise well-appearing well-hydrated will discharge at this time        Final diagnoses:   Conjunctivitis of left eye, unspecified conjunctivitis type       ED Disposition  ED Disposition     ED Disposition   Discharge    Condition   Stable    Comment   --             Ayaz Luna MD  [address]  [place] [address] [phone]          [facility] - [address] [address]  [place] [address]  [phone]             Medication List      New Prescriptions    trimethoprim-polymyxin b 41478-8.1 UNIT/ML-% ophthalmic solution  Commonly known as: Polytrim  Administer 1 drop into the left eye Every 4 (Four) Hours for 7 days.           Where to Get Your Medications      These medications were sent to [id number] - [place], [place] - [address] AT [address] - [phone]  - [phone] FX  [address], [place] [address]    Phone: [phone]   · trimethoprim-polymyxin b 60481-4.1 UNIT/ML-% ophthalmic solution

## 2023-04-07 ENCOUNTER — TELEPHONE (OUTPATIENT)
Dept: NEUROSURGERY | Facility: CLINIC | Age: 73
End: 2023-04-07
Payer: MEDICARE

## 2023-04-07 NOTE — TELEPHONE ENCOUNTER
LM FOR PATIENT TO CALL BACK, I AM NEEDING TO SEE WHO SHE SEE FOR CARDIO AND PULMONARY TO OBTAIN POSSIBLE CLEARANCE FOR SURGERY WITH DR MOON.

## 2023-04-10 ENCOUNTER — TELEPHONE (OUTPATIENT)
Dept: PHYSICAL THERAPY | Facility: CLINIC | Age: 73
End: 2023-04-10

## 2023-04-12 ENCOUNTER — OFFICE VISIT (OUTPATIENT)
Dept: FAMILY MEDICINE CLINIC | Facility: CLINIC | Age: 73
End: 2023-04-12
Payer: MEDICARE

## 2023-04-12 VITALS
SYSTOLIC BLOOD PRESSURE: 110 MMHG | DIASTOLIC BLOOD PRESSURE: 62 MMHG | OXYGEN SATURATION: 92 % | BODY MASS INDEX: 33.68 KG/M2 | HEIGHT: 62 IN | WEIGHT: 183 LBS | RESPIRATION RATE: 16 BRPM | HEART RATE: 89 BPM

## 2023-04-12 DIAGNOSIS — M47.12 CERVICAL SPONDYLOSIS WITH MYELOPATHY: Primary | ICD-10-CM

## 2023-04-12 DIAGNOSIS — Z41.9 SURGERY, ELECTIVE: ICD-10-CM

## 2023-04-12 DIAGNOSIS — J45.41 MODERATE PERSISTENT REACTIVE AIRWAY DISEASE WITH ACUTE EXACERBATION: ICD-10-CM

## 2023-04-12 PROBLEM — J45.901 REACTIVE AIRWAY DISEASE WITH ACUTE EXACERBATION: Status: ACTIVE | Noted: 2023-04-12

## 2023-04-12 PROCEDURE — 3078F DIAST BP <80 MM HG: CPT | Performed by: FAMILY MEDICINE

## 2023-04-12 PROCEDURE — 3074F SYST BP LT 130 MM HG: CPT | Performed by: FAMILY MEDICINE

## 2023-04-12 PROCEDURE — 99214 OFFICE O/P EST MOD 30 MIN: CPT | Performed by: FAMILY MEDICINE

## 2023-04-12 PROCEDURE — 1159F MED LIST DOCD IN RCRD: CPT | Performed by: FAMILY MEDICINE

## 2023-04-12 PROCEDURE — 1160F RVW MEDS BY RX/DR IN RCRD: CPT | Performed by: FAMILY MEDICINE

## 2023-04-12 NOTE — PROGRESS NOTES
Chief Complaint  Edema (F/u)    Subjective      Lori D Lesley presents to [facility]  History of Present Illness  For 3 month follow up on edema legs    The patient presents today for a follow-up.    The patient states that she has conjunctivitis.      She notes that she went to Dr. Mcgowan for her cervical spine, and he suggested that she get the operation. The patient states that he would take a disc out and put a spacer in. She notes she was concerned about getting off the ventilator because her breathing is so bad. The patient states he will send her to a cardiologist and to a pulmonologist before she decides.      She notes that she does not walk very well at all.  She notes that she feels unsure of her feet on uneven ground or at night. The patient states that at [facility], she spilled a glass of wine on her daughter's tablecloth. She notes that she was embarrassed. She notes that she cannot  things.     The patient states that her legs are weak. She notes that she is starting to feel the numbness in her feet. The patient states that she ordered a machine that was supposed to help her get a compression sock on. She notes that her hands come into play because she does not have the strength. The patient states that she has been going to physical therapy through Dr. Dye. She notes that he wanted her to do physical therapy before she did the surgery. The patient states that she bought massagers, but she has not used them yet.      She notes that she is swallowing well. The patient states that she does not have a cardiologist. She notes that she checks her blood pressure at home sometimes. She denies feeling lightheaded. She notes that she is using Symbicort. She previously was using albuterol. The patient states that she is still taking the nerve vitamins that were suggested. She notes that she could not get it over the counter. The patient states that she has never had a  "heart attack or any kind of heart problem.       Objective   Vital Signs:  /62 (BP Location: Left arm, Patient Position: Sitting, Cuff Size: Adult)   Pulse 89   Resp 16   Ht 157.5 cm (62\")   Wt 83 kg (183 lb)   SpO2 92%   BMI 33.47 kg/m²   Estimated body mass index is 33.47 kg/m² as calculated from the following:    Height as of this encounter: 157.5 cm (62\").    Weight as of this encounter: 83 kg (183 lb).       BMI is >= 30 and <35. (Class 1 Obesity). The following options were offered after discussion;: weight loss educational material (shared in after visit summary), exercise counseling/recommendations and nutrition counseling/recommendations      Physical Exam  Constitutional:       Appearance: Normal appearance. She is well-developed and normal weight.   HENT:      Head: Normocephalic and atraumatic.      Right Ear: Tympanic membrane, ear canal and external ear normal.      Left Ear: Tympanic membrane, ear canal and external ear normal.      Nose: Nose normal.      Mouth/Throat:      Mouth: Mucous membranes are moist.      Pharynx: Oropharynx is clear. No oropharyngeal exudate.   Eyes:      Extraocular Movements: Extraocular movements intact.      Conjunctiva/sclera: Conjunctivae normal.      Pupils: Pupils are equal, round, and reactive to light.   Cardiovascular:      Rate and Rhythm: Normal rate and regular rhythm.      Pulses: Normal pulses.      Heart sounds: Normal heart sounds.   Pulmonary:      Effort: Pulmonary effort is normal.      Breath sounds: Normal breath sounds.   Abdominal:      General: Bowel sounds are normal.      Palpations: Abdomen is soft.   Musculoskeletal:         General: Normal range of motion.      Cervical back: Normal range of motion and neck supple.   Skin:     General: Skin is warm and dry.   Neurological:      General: No focal deficit present.      Mental Status: She is alert and oriented to person, place, and time. Mental status is at baseline.   Psychiatric:    "      Mood and Affect: Mood normal.         Behavior: Behavior normal.         Thought Content: Thought content normal.         Judgment: Judgment normal.          Assessment and Plan     1. Cervical spondylosis with myelopathy  - Lori Sutton is a 73-year-old who has developed cervical spondylosis and very tight lesion in her neck that has left her with some myelopathy. She has numbness, tingling, and weakness in her arms and also in her legs. MRI shows the tight lesion and she is having symptoms compatible with that. She is going to have as recommended by Dr. Wilber Green, a C5 corpectomy and a C4-C6 fusion. She is going to require both pulmonary and cardiac clearance prior to this procedure. Hopefully, we will be able to get this done sometime early this summer.    2. Reactive airway disease  - The patient is wheezing today. She has reactive airway disease. It has kicked up probably from allergies in the spring, but just anytime she is under some stress or has to make a lot of decisions, she gets tight and this is one of those times. We are going to continue her Symbicort 2 inhalations twice a day and she is going to use albuterol more often to try to keep her airways open and if we need to run her through a short course of steroids, we will. Preferably though with surgery coming up, I would like her to have only inhaled steroids. She is going to be seeing Dr. Green back in 06/2023 and then hopefully she will have her pulmonary and her cardiac clearance completed by then and they consider an actual date for surgery.         Follow Up Return in about 4 months (around 8/12/2023), or if symptoms worsen or fail to improve, for Recheck.  Patient was given instructions and counseling regarding her condition or for health maintenance advice. Please see specific information pulled into the AVS if appropriate.     Transcribed from ambient dictation for Ayaz Luna MD by Gayathri Ntahan.  04/12/23   15:22  EDT    Patient or patient representative verbalized consent to the visit recording.  I have personally performed the services described in this document as transcribed by the above individual, and it is both accurate and complete.  Ayaz Luna MD  4/23/2023  08:47 EDT

## 2023-04-14 NOTE — TELEPHONE ENCOUNTER
PATIENT CALLED  IN AND STATES SHE HAS NOT HEARD FROM PULMONOLOGY.  PLEASE CALL PATIENT WITH AN UPDATE.  THANK YOU!

## 2023-04-18 ENCOUNTER — TELEPHONE (OUTPATIENT)
Dept: CARDIOLOGY | Facility: CLINIC | Age: 73
End: 2023-04-18
Payer: MEDICARE

## 2023-04-18 NOTE — TELEPHONE ENCOUNTER
4/20 appt , NP , says for a clearance     I do not see any cardiac clearance request in chart    Yellow form with chart

## 2023-04-20 ENCOUNTER — PATIENT ROUNDING (BHMG ONLY) (OUTPATIENT)
Dept: CARDIOLOGY | Facility: CLINIC | Age: 73
End: 2023-04-20

## 2023-04-20 ENCOUNTER — OFFICE VISIT (OUTPATIENT)
Dept: CARDIOLOGY | Facility: CLINIC | Age: 73
End: 2023-04-20
Payer: MEDICARE

## 2023-04-20 VITALS
OXYGEN SATURATION: 92 % | HEART RATE: 90 BPM | DIASTOLIC BLOOD PRESSURE: 41 MMHG | BODY MASS INDEX: 33.13 KG/M2 | SYSTOLIC BLOOD PRESSURE: 90 MMHG | WEIGHT: 180 LBS | HEIGHT: 62 IN

## 2023-04-20 DIAGNOSIS — R06.09 DYSPNEA ON EXERTION: Primary | ICD-10-CM

## 2023-04-20 DIAGNOSIS — E66.9 OBESITY (BMI 30-39.9): ICD-10-CM

## 2023-04-20 DIAGNOSIS — R60.0 EDEMA LEG: ICD-10-CM

## 2023-04-20 DIAGNOSIS — M47.12 CERVICAL SPONDYLOSIS WITH MYELOPATHY: ICD-10-CM

## 2023-04-20 DIAGNOSIS — Z01.810 PREOPERATIVE CARDIOVASCULAR EXAMINATION: ICD-10-CM

## 2023-04-20 DIAGNOSIS — I50.32 HYPERTENSIVE HEART DISEASE WITH CHRONIC DIASTOLIC CONGESTIVE HEART FAILURE: ICD-10-CM

## 2023-04-20 DIAGNOSIS — E78.5 DYSLIPIDEMIA: ICD-10-CM

## 2023-04-20 DIAGNOSIS — I11.0 HYPERTENSIVE HEART DISEASE WITH CHRONIC DIASTOLIC CONGESTIVE HEART FAILURE: ICD-10-CM

## 2023-04-20 NOTE — PROGRESS NOTES
A My-Chart message has been sent to the patient for PATIENT ROUNDING with [facility]

## 2023-04-20 NOTE — PROGRESS NOTES
Cardiology Consult Note    Patient Identification:  Name: Lori Sutton  Age: 73 y.o.  Sex: female  :  1950  MRN: [id number]               Requesting Physician :  Ayaz Luna MD      Reason for Consultation / Chief Complaint : Cardiac clearance    History of Present Illness:      Ms. Lori Devine has PMH of    Hypertension  Dyslipidemia  Hysterectomy, Appendectomy  Allergies/intolerance to NSAIDs  Former smoker    Here for preoperative cardiovascular evaluation for C-spine surgery.  Patient has shortness of breath and dyspnea on exertion leg edema.  Patient thinks her shortness of breath is due to her asthma.  And her edema is due to lymphedema.    Patient's arterial blood pressure was 73/24 repeat was 90/41, heart rate 93, O2 sat of 93% on room air.  Patient has a paper which she carries which has medication list.  Patient's medication list included atorvastatin, Diovan hydrochlorothiazide 300-25, metoprolol, KCl.  But patient is saying she is taking hydralazine 3 times a day furosemide because her dermatologist recommended it.    Review of records :    UZIEL 2022 were normal.    Labs from 2022 reveal CMP with a glucose of 104, potassium 3.5, CBC with a hemoglobin of 12.4.  Cholesterol 238, triglycerides 124, HDL 80, LDL 86.      Assessment:  :    Preoperative cardiovascular evaluation  Dyspnea on exertion  Leg edema  Hypertensive cardiovascular disease with chronic diastolic heart failure due to preserved EF  Dyslipidemia  Obesity with BMI over 30      Recommendations / Plan:        Reviewed EKG results with patient  We will check an echo to assess LV function  We will check a UA.  We will check renal function.  We will check Lexiscan Cardiolite to evaluate for ischemia.  We will follow-up and consider further evaluation treatment.  Advised patient to bring her medication bottles.  Patient's initial blood pressure was 73/24.  I had a lengthy discussion with patient about medication  discrepancy between her list and what she is taking.  Advised her to be admitted but patient says she feels fine and does not want to be admitted.  Advised her to bring her medication pillboxes to verify..           Diagnosis Plan   1. Dyspnea on exertion  Basic Metabolic Panel    Adult Transthoracic Echo Complete W/ Cont if Necessary Per Protocol    BNP    Urinalysis without microscopic (no culture) - Urine, Clean Catch    Stress Test With Myocardial Perfusion One Day      2. Edema leg  Basic Metabolic Panel    Adult Transthoracic Echo Complete W/ Cont if Necessary Per Protocol    BNP    Urinalysis without microscopic (no culture) - Urine, Clean Catch    Stress Test With Myocardial Perfusion One Day      3. Preoperative cardiovascular examination  Basic Metabolic Panel    Adult Transthoracic Echo Complete W/ Cont if Necessary Per Protocol    BNP    Urinalysis without microscopic (no culture) - Urine, Clean Catch    Stress Test With Myocardial Perfusion One Day      4. Hypertensive heart disease with chronic diastolic congestive heart failure  Basic Metabolic Panel    Adult Transthoracic Echo Complete W/ Cont if Necessary Per Protocol    BNP    Urinalysis without microscopic (no culture) - Urine, Clean Catch    Stress Test With Myocardial Perfusion One Day      5. Dyslipidemia  Basic Metabolic Panel    Adult Transthoracic Echo Complete W/ Cont if Necessary Per Protocol    BNP    Urinalysis without microscopic (no culture) - Urine, Clean Catch    Stress Test With Myocardial Perfusion One Day      6. Obesity (BMI 30-39.9)  Basic Metabolic Panel    Adult Transthoracic Echo Complete W/ Cont if Necessary Per Protocol    BNP    Urinalysis without microscopic (no culture) - Urine, Clean Catch    Stress Test With Myocardial Perfusion One Day      7. Cervical spondylosis with myelopathy  Basic Metabolic Panel    Adult Transthoracic Echo Complete W/ Cont if Necessary Per Protocol    BNP    Urinalysis without microscopic (no  culture) - Urine, Clean Catch    Stress Test With Myocardial Perfusion One Day                 Past Medical History:  Past Medical History:   Diagnosis Date   • Fibrocystic breast    • Hyperlipidemia    • Hypertension      Past Surgical History:  Past Surgical History:   Procedure Laterality Date   • APPENDECTOMY  1958   • AUGMENTATION MAMMAPLASTY     • BREAST CYST ASPIRATION     • BREAST CYST EXCISION     • HYSTERECTOMY     • TONSILLECTOMY  1958      Allergies:  Allergies   Allergen Reactions   • Nsaids GI Bleeding     Home Meds:  (Not in a hospital admission)    Current Meds:     Current Outpatient Medications:   •  ALBUTEROL SULFATE  (90 Base) MCG/ACT inhaler, INHALE 2 PUFFS BY MOUTH EVERY 4 HOURS AS NEEDED FOR WHEEZING, Disp: 42.5 g, Rfl: 2  •  allopurinol (ZYLOPRIM) 300 MG tablet, TAKE 1 TABLET BY MOUTH  DAILY, Disp: 90 tablet, Rfl: 3  •  atorvastatin (LIPITOR) 20 MG tablet, TAKE 1 TABLET BY MOUTH  DAILY, Disp: 90 tablet, Rfl: 3  •  Cholecalciferol 1000 units tablet, VITAMIN D TABS, Disp: , Rfl:   •  citalopram (CeleXA) 20 MG tablet, TAKE 1 TABLET BY MOUTH  DAILY, Disp: 90 tablet, Rfl: 3  •  fluorometholone (FML) 0.1 % ophthalmic suspension, , Disp: , Rfl:   •  l-methylfolate-algae-B12-B6 3-90.314-2-35 MG capsule capsule, Take 1 capsule by mouth 2 (Two) Times a Day., Disp: 180 capsule, Rfl: 3  •  levothyroxine (SYNTHROID, LEVOTHROID) 25 MCG tablet, TAKE 1 TABLET BY MOUTH  DAILY, Disp: 90 tablet, Rfl: 3  •  metoprolol succinate XL (TOPROL-XL) 100 MG 24 hr tablet, TAKE 1 TABLET BY MOUTH  DAILY, Disp: 90 tablet, Rfl: 3  •  montelukast (SINGULAIR) 10 MG tablet, TAKE 1 TABLET BY MOUTH  DAILY, Disp: 90 tablet, Rfl: 3  •  ofloxacin (OCUFLOX) 0.3 % ophthalmic solution, , Disp: , Rfl:   •  omeprazole (priLOSEC) 40 MG capsule, TAKE 1 CAPSULE BY MOUTH  DAILY, Disp: 90 capsule, Rfl: 3  •  potassium chloride (K-DUR,KLOR-CON) 10 MEQ CR tablet, , Disp: , Rfl:   •  Prolensa 0.07 % solution, , Disp: , Rfl:   •   promethazine (PHENERGAN) 25 MG tablet, Take 1 tablet by mouth Every 8 (Eight) Hours As Needed for Nausea or Vomiting., Disp: 10 tablet, Rfl: 1  •  solifenacin (VESICARE) 5 MG tablet, Take 1 tablet by mouth Every 12 (Twelve) Hours., Disp: , Rfl:   •  Symbicort 80-4.5 MCG/ACT inhaler, INHALE 2 INHALATIONS BY  MOUTH TWICE DAILY, Disp: 10.2 g, Rfl: 11  •  triamcinolone (KENALOG) 0.1 % ointment, APPLY TOPICALLY TO LOWER LEG EVERY DAY, Disp: , Rfl:   •  triamcinolone (KENALOG) 0.5 % ointment, APPLY TOPICALLY TO THE  APPROPRIATE AREA AS  DIRECTED TWICE DAILY, Disp: 90 g, Rfl: 1  •  valsartan-hydrochlorothiazide (DIOVAN-HCT) 320-25 MG per tablet, TAKE 1 TABLET BY MOUTH  DAILY, Disp: 90 tablet, Rfl: 3  •  vitamin E 100 UNIT capsule, VITAMIN E CAPS, Disp: , Rfl:   •  furosemide (LASIX) 40 MG tablet, Take 1 tablet by mouth Daily for 90 days., Disp: 90 tablet, Rfl: 2  •  hydrALAZINE (APRESOLINE) 50 MG tablet, Take 1 tablet by mouth 3 (Three) Times a Day for 30 days., Disp: 90 tablet, Rfl: 11  Social History:   Social History     Tobacco Use   • Smoking status: Former     Packs/day: 1.50     Years: 40.00     Pack years: 60.00     Types: Cigarettes     Start date: 1965     Quit date: 2006     Years since quittin.3   • Smokeless tobacco: Never   Substance Use Topics   • Alcohol use: Yes     Alcohol/week: 11.0 standard drinks     Types: 4 Glasses of wine, 7 Shots of liquor per week     Comment: daily, vodka tonic      Family History:  Family History   Problem Relation Age of Onset   • Pancreatic cancer Mother    • Stroke Father    • Stroke Sister    • Prostate cancer Brother         Review of Systems : Review of Systems   Constitutional: Negative for malaise/fatigue.   Cardiovascular: Positive for leg swelling. Negative for chest pain, dyspnea on exertion and palpitations.   Respiratory: Positive for shortness of breath. Negative for cough.    Gastrointestinal: Negative for abdominal pain, nausea and vomiting.  "  Neurological: Positive for numbness. Negative for dizziness, focal weakness, headaches and light-headedness.   All other systems reviewed and are negative.               Constitutional:  Heart Rate:  [90-93] 90  BP: (73-90)/(24-41) 90/41    Physical Exam   BP 90/41 (BP Location: Left arm, Patient Position: Sitting, Cuff Size: Large Adult)   Pulse 90   Ht 157.5 cm (62\")   Wt 81.6 kg (180 lb)   SpO2 92%   BMI 32.92 kg/m²   Physical Exam  General:  Appears in no acute distress  Eyes: Sclerae are anicteric,  conjunctivae are clear   HEENT:  No JVD. Thyroid not visibly enlarged. No mucosal pallor or cyanosis  Respiratory: Respirations regular and unlabored at rest.  Bilaterally good breath sounds with good air entry in all fields. No crackles, rubs or wheezes auscultated  Cardiovascular: S1,S2 Regular rate and rhythm. No murmur, rub or gallop auscultated.     Gastrointestinal: Abdomen soft, flat, nontender. Bowel sounds present.   Musculoskeletal:  No abnormal movements  Extremities: 3+ pitting edema  Skin: Stasis changes seen.  Neuro: Alert and awake, no lateralizing deficits appreciated    Cardiographics  ECG: EKG tracing was  personally reviewed/interpreted by me    ECG 12 Lead    Date/Time: 4/20/2023 3:14 PM  Performed by: Diego Garza MD  Authorized by: Diego Garza MD   Comparison: not compared with previous ECG   Previous ECG: no previous ECG available  Comments: EKG done today reviewed/interpreted by me reveals sinus rhythm with rate of 87 bpm with poor R wave progression, left atrial enlargement.,  No comparison EKG available.              Imaging  Chest X-ray:   Imaging Results (Last 24 Hours)     ** No results found for the last 24 hours. **          Lab Review: I have reviewed the labs                                      Diego Garza MD  4/20/2023, 15:12 EDT      EMR Dragon/Transcription:   Dictated utilizing Dragon dictation  "

## 2023-04-24 ENCOUNTER — TELEPHONE (OUTPATIENT)
Dept: PHYSICAL THERAPY | Facility: CLINIC | Age: 73
End: 2023-04-24

## 2023-04-24 ENCOUNTER — LAB (OUTPATIENT)
Dept: LAB | Facility: HOSPITAL | Age: 73
End: 2023-04-24
Payer: MEDICARE

## 2023-04-24 DIAGNOSIS — R06.09 DYSPNEA ON EXERTION: ICD-10-CM

## 2023-04-24 DIAGNOSIS — E66.9 OBESITY (BMI 30-39.9): ICD-10-CM

## 2023-04-24 DIAGNOSIS — R60.0 EDEMA LEG: ICD-10-CM

## 2023-04-24 DIAGNOSIS — Z01.810 PREOPERATIVE CARDIOVASCULAR EXAMINATION: ICD-10-CM

## 2023-04-24 DIAGNOSIS — I11.0 HYPERTENSIVE HEART DISEASE WITH CHRONIC DIASTOLIC CONGESTIVE HEART FAILURE: ICD-10-CM

## 2023-04-24 DIAGNOSIS — E78.5 DYSLIPIDEMIA: ICD-10-CM

## 2023-04-24 DIAGNOSIS — I50.32 HYPERTENSIVE HEART DISEASE WITH CHRONIC DIASTOLIC CONGESTIVE HEART FAILURE: ICD-10-CM

## 2023-04-24 DIAGNOSIS — M47.12 CERVICAL SPONDYLOSIS WITH MYELOPATHY: ICD-10-CM

## 2023-04-24 LAB
ANION GAP SERPL CALCULATED.3IONS-SCNC: 11 MMOL/L (ref 5–15)
BILIRUB UR QL STRIP: NEGATIVE
BUN SERPL-MCNC: 9 MG/DL (ref 8–23)
BUN/CREAT SERPL: 18.4 (ref 7–25)
CALCIUM SPEC-SCNC: 9.8 MG/DL (ref 8.6–10.5)
CHLORIDE SERPL-SCNC: 92 MMOL/L (ref 98–107)
CLARITY UR: CLEAR
CO2 SERPL-SCNC: 31 MMOL/L (ref 22–29)
COLOR UR: ABNORMAL
CREAT SERPL-MCNC: 0.49 MG/DL (ref 0.57–1)
EGFRCR SERPLBLD CKD-EPI 2021: 98.4 ML/MIN/1.73
GLUCOSE SERPL-MCNC: 110 MG/DL (ref 65–99)
GLUCOSE UR STRIP-MCNC: NEGATIVE MG/DL
HGB UR QL STRIP.AUTO: NEGATIVE
KETONES UR QL STRIP: ABNORMAL
LEUKOCYTE ESTERASE UR QL STRIP.AUTO: NEGATIVE
NITRITE UR QL STRIP: NEGATIVE
NT-PROBNP SERPL-MCNC: 513 PG/ML (ref 0–1800)
PH UR STRIP.AUTO: 7 [PH] (ref 5–8)
POTASSIUM SERPL-SCNC: 3.9 MMOL/L (ref 3.5–5.2)
PROT UR QL STRIP: NEGATIVE
SODIUM SERPL-SCNC: 134 MMOL/L (ref 136–145)
SP GR UR STRIP: 1.02 (ref 1–1.03)
UROBILINOGEN UR QL STRIP: ABNORMAL

## 2023-04-24 PROCEDURE — 80048 BASIC METABOLIC PNL TOTAL CA: CPT

## 2023-04-24 PROCEDURE — 36415 COLL VENOUS BLD VENIPUNCTURE: CPT

## 2023-04-24 PROCEDURE — 83880 ASSAY OF NATRIURETIC PEPTIDE: CPT

## 2023-04-24 PROCEDURE — 81003 URINALYSIS AUTO W/O SCOPE: CPT

## 2023-04-25 ENCOUNTER — TELEPHONE (OUTPATIENT)
Dept: NEUROSURGERY | Facility: CLINIC | Age: 73
End: 2023-04-25
Payer: MEDICARE

## 2023-04-25 NOTE — TELEPHONE ENCOUNTER
Caller: Lori Sutton    Relationship: Self    Best call back number: [phone]    What specialty or service is being requested: PULMONOLGY    What is the provider, practice or medical service name: DR [facility]    Any additional details: PATIENT STATES DR PHAM'S OFFICE HAS NOT RECEIVED REFERRAL. OFFICE FAX # [phone]. PLEASE CALL PATIENT TO ADVISE ONCE REFERRAL HAS BEEN FAXED.

## 2023-04-25 NOTE — TELEPHONE ENCOUNTER
refaxed info to Dr Melendez office and sent patient a my chart message to let her know this was done.

## 2023-05-07 ENCOUNTER — HOSPITAL ENCOUNTER (OUTPATIENT)
Facility: HOSPITAL | Age: 73
Discharge: HOME | End: 2023-05-07
Attending: EMERGENCY MEDICINE | Admitting: EMERGENCY MEDICINE
Payer: MEDICARE

## 2023-05-07 ENCOUNTER — APPOINTMENT (OUTPATIENT)
Dept: GENERAL RADIOLOGY | Facility: HOSPITAL | Age: 73
End: 2023-05-07
Payer: MEDICARE

## 2023-05-07 VITALS
BODY MASS INDEX: 33.13 KG/M2 | RESPIRATION RATE: 18 BRPM | OXYGEN SATURATION: 96 % | HEIGHT: 62 IN | SYSTOLIC BLOOD PRESSURE: 141 MMHG | TEMPERATURE: 97.9 F | HEART RATE: 77 BPM | DIASTOLIC BLOOD PRESSURE: 68 MMHG | WEIGHT: 180 LBS

## 2023-05-07 DIAGNOSIS — J44.9 ASTHMATIC BRONCHITIS , CHRONIC: Primary | ICD-10-CM

## 2023-05-07 LAB
FLUAV SUBTYP SPEC NAA+PROBE: NOT DETECTED
FLUBV RNA ISLT QL NAA+PROBE: NOT DETECTED
SARS-COV-2 RNA RESP QL NAA+PROBE: NOT DETECTED

## 2023-05-07 PROCEDURE — 71046 X-RAY EXAM CHEST 2 VIEWS: CPT

## 2023-05-07 PROCEDURE — 63710000001 PREDNISONE PER 1 MG: Performed by: EMERGENCY MEDICINE

## 2023-05-07 PROCEDURE — 87636 SARSCOV2 & INF A&B AMP PRB: CPT

## 2023-05-07 PROCEDURE — G0463 HOSPITAL OUTPT CLINIC VISIT: HCPCS | Performed by: EMERGENCY MEDICINE

## 2023-05-07 RX ORDER — AZITHROMYCIN 250 MG/1
250 TABLET, FILM COATED ORAL DAILY
Qty: 4 TABLET | Refills: 0 | Status: SHIPPED | OUTPATIENT
Start: 2023-05-07

## 2023-05-07 RX ORDER — PREDNISONE 20 MG/1
60 TABLET ORAL ONCE
Status: COMPLETED | OUTPATIENT
Start: 2023-05-07 | End: 2023-05-07

## 2023-05-07 RX ORDER — AZITHROMYCIN 250 MG/1
500 TABLET, FILM COATED ORAL ONCE
Status: COMPLETED | OUTPATIENT
Start: 2023-05-07 | End: 2023-05-07

## 2023-05-07 RX ORDER — PREDNISONE 10 MG/1
TABLET ORAL
Qty: 15 TABLET | Refills: 0 | Status: SHIPPED | OUTPATIENT
Start: 2023-05-07 | End: 2023-05-12

## 2023-05-07 RX ORDER — IPRATROPIUM BROMIDE AND ALBUTEROL SULFATE 2.5; .5 MG/3ML; MG/3ML
3 SOLUTION RESPIRATORY (INHALATION) ONCE
Status: COMPLETED | OUTPATIENT
Start: 2023-05-07 | End: 2023-05-07

## 2023-05-07 RX ORDER — GUAIFENESIN AND CODEINE PHOSPHATE 100; 10 MG/5ML; MG/5ML
5 SOLUTION ORAL 4 TIMES DAILY PRN
Qty: 118 ML | Refills: 0 | Status: SHIPPED | OUTPATIENT
Start: 2023-05-07

## 2023-05-07 RX ADMIN — IPRATROPIUM BROMIDE AND ALBUTEROL SULFATE 3 ML: .5; 3 SOLUTION RESPIRATORY (INHALATION) at 16:46

## 2023-05-07 RX ADMIN — AZITHROMYCIN MONOHYDRATE 500 MG: 250 TABLET ORAL at 16:44

## 2023-05-07 RX ADMIN — PREDNISONE 60 MG: 20 TABLET ORAL at 16:45

## 2023-05-07 NOTE — FSED PROVIDER NOTE
Subjective   History of Present Illness  The patient is a 73-year-old female with history of bronchitis, asthma and COPD, who presents emergency room with complaints of exacerbation of her pulmonary condition.  Patient states that she has had increased cough and congestion over the past week.  Patient states that she has been trying over-the-counter medications without improvement of her condition.  She reports wheezing.  She denies any fever.  She is here for an evaluation.        Review of Systems   Constitutional: Negative.  Negative for chills, fatigue and fever.   Eyes: Negative.    Respiratory: Positive for cough, chest tightness, shortness of breath and wheezing.    Cardiovascular: Negative for chest pain and palpitations.   Gastrointestinal: Negative for abdominal pain, diarrhea, nausea and vomiting.   Genitourinary: Negative.    Musculoskeletal: Negative.    Skin: Negative.  Negative for rash.   Neurological: Negative.  Negative for syncope, weakness, numbness and headaches.   Psychiatric/Behavioral: Negative.    All other systems reviewed and are negative.      Past Medical History:   Diagnosis Date   • Fibrocystic breast    • Hyperlipidemia    • Hypertension        Allergies   Allergen Reactions   • Nsaids GI Bleeding       Past Surgical History:   Procedure Laterality Date   • APPENDECTOMY     • AUGMENTATION MAMMAPLASTY     • BREAST CYST ASPIRATION     • BREAST CYST EXCISION     • HYSTERECTOMY     • TONSILLECTOMY         Family History   Problem Relation Age of Onset   • Pancreatic cancer Mother    • Stroke Father    • Stroke Sister    • Prostate cancer Brother        Social History     Socioeconomic History   • Marital status:    Tobacco Use   • Smoking status: Former     Packs/day: 1.50     Years: 40.00     Pack years: 60.00     Types: Cigarettes     Start date: 1965     Quit date: 2006     Years since quittin.3   • Smokeless tobacco: Never   Vaping Use   • Vaping Use: Never  used   Substance and Sexual Activity   • Alcohol use: Yes     Alcohol/week: 11.0 standard drinks     Types: 4 Glasses of wine, 7 Shots of liquor per week     Comment: daily, vodka tonic   • Drug use: No   • Sexual activity: Not Currently     Partners: Male     Birth control/protection: Post-menopausal           Objective   Physical Exam  Vitals and nursing note reviewed.   Constitutional:       General: She is not in acute distress.     Appearance: Normal appearance. She is normal weight.   HENT:      Head: Normocephalic and atraumatic.      Right Ear: External ear normal.      Left Ear: External ear normal.      Nose: Nose normal.      Mouth/Throat:      Mouth: Mucous membranes are moist.   Eyes:      Extraocular Movements: Extraocular movements intact.      Conjunctiva/sclera: Conjunctivae normal.      Pupils: Pupils are equal, round, and reactive to light.   Cardiovascular:      Rate and Rhythm: Normal rate and regular rhythm.      Pulses: Normal pulses.      Heart sounds: Normal heart sounds. No murmur heard.    No friction rub. No gallop.   Pulmonary:      Effort: Accessory muscle usage present. No respiratory distress.      Breath sounds: Wheezing present.   Abdominal:      General: Abdomen is flat. There is no distension.      Tenderness: There is no abdominal tenderness.   Musculoskeletal:         General: No deformity or signs of injury. Normal range of motion.      Cervical back: Normal range of motion and neck supple. No tenderness.   Skin:     General: Skin is warm.      Capillary Refill: Capillary refill takes less than 2 seconds.   Neurological:      General: No focal deficit present.      Mental Status: She is alert and oriented to person, place, and time. Mental status is at baseline.   Psychiatric:         Mood and Affect: Mood normal.         Procedures           ED Course  ED Course as of 05/08/23 0734   Sun May 07, 2023   1625 Chest x-ray is clear. [KZ]      ED Course User Index  [KZ] Buster  Jeramy DYE MD                                           Medical Decision Making  This patient presents with acute cough and shortness of breath.  Patient reports history of asthma and COPD.  She is a non-smoker.   Differential diagnosis includes acute bacterial pneumonia, influenza, asthma, COPD exacerbation, bronchitis, transient airway hyperresponsiveness. Presentation not consistent with CHF, pneumothorax, pulmonary embolism, lung cancer or mass.    Plan: Chest x-ray, swabs ordered, nebulizer treatment.    Swabs negative.    Chest x-ray read as negative.  Patient breathing better after her breathing treatment.  She is prescribed appropriate medications at discharge.    Asthmatic bronchitis , chronic: acute illness or injury  Amount and/or Complexity of Data Reviewed  Labs: ordered. Decision-making details documented in ED Course.  Radiology: ordered. Decision-making details documented in ED Course.      Risk  OTC drugs.  Prescription drug management.          Final diagnoses:   Asthmatic bronchitis , chronic       ED Disposition  ED Disposition     ED Disposition   Discharge    Condition   Stable    Comment   --             Ayaz Luna MD  [address]  [place] [address]  [phone]    Schedule an appointment as soon as possible for a visit in 1 week  For repeat evaluation         Medication List      New Prescriptions    azithromycin 250 MG tablet  Commonly known as: ZITHROMAX  Take 1 tablet by mouth Daily.     guaiFENesin-codeine 100-10 MG/5ML liquid  Commonly known as: GUAIFENESIN AC  Take 5 mL by mouth 4 (Four) Times a Day As Needed for Cough.     predniSONE 10 MG tablet  Commonly known as: DELTASONE  Take 5 tablets by mouth Daily for 1 day, THEN 4 tablets Daily for 1 day, THEN 3 tablets Daily for 1 day, THEN 2 tablets Daily for 1 day, THEN 1 tablet Daily for 1 day.  Start taking on: May 7, 2023           Where to Get Your Medications      These medications were sent to [id number] - [place], [place] - [address] AT [facility] [address] [facility] [address] [place] - [phone] [facility] [phone] [address]  [address], [place] [address]    Phone: [phone]   · azithromycin 250 MG tablet  · guaiFENesin-codeine 100-10 MG/5ML liquid  · predniSONE 10 MG tablet

## 2023-05-07 NOTE — DISCHARGE INSTRUCTIONS
You have been diagnosed with bronchitis, take steroids, antibiotics and cough medication as prescribed.  Follow-up with PCP in 1 week for repeat evaluation.  Return to the emergency room for any concerns.

## 2023-05-08 ENCOUNTER — DOCUMENTATION (OUTPATIENT)
Dept: PHYSICAL THERAPY | Facility: CLINIC | Age: 73
End: 2023-05-08
Payer: MEDICARE

## 2023-05-08 NOTE — PROGRESS NOTES
Discharge Summary  Discharge Summary from Physical Therapy Report      Dates  PT visit: 3/2/23-4/5/23  Number of Visits: 10     Discharge Status of Patient: See MD Note dated 3/30/23    Goals: Partially Met    Discharge Plan: Patient to return to referring/providing physician    Comments Patient is undergoing further testing for surgical clearance.  She has cancelled her remaining PT visits and will be following up with neurosurgery.    Date of Discharge 5/8/23        Cassandra Coker, PT  Physical Therapist

## 2023-05-15 ENCOUNTER — TRANSCRIBE ORDERS (OUTPATIENT)
Dept: ADMINISTRATIVE | Facility: HOSPITAL | Age: 73
End: 2023-05-15
Payer: MEDICARE

## 2023-05-15 DIAGNOSIS — R06.00 DYSPNEA, UNSPECIFIED TYPE: Primary | ICD-10-CM

## 2023-05-21 NOTE — PROGRESS NOTES
Subjective:     Encounter Date:05/25/2023      Patient ID: Lori Sutton is a 73 y.o. female.    Chief Complaint and history of present illness:    Patient is here for preoperative cardiovascular clearance underwent stress test here for stress test follow-up.    History of Present Illness:      Ms. Lori Devine has PMH of    Hypertension  Dyslipidemia  Hysterectomy, Appendectomy  Allergies/intolerance to NSAIDs  Former smoker    Here for stress test follow-up.  Patient was seen for preoperative cardiovascular evaluation for C-spine surgery.  Patient has shortness of breath and dyspnea on exertion leg edema.  Patient thinks her shortness of breath is due to her asthma.  And her edema is due to lymphedema.    Patient's arterial blood pressure was 124/80, heart rate 81.  BMI is over 30.    Patient's medication list included atorvastatin, Diovan hydrochlorothiazide 300-25, metoprolol, KCl.  But patient is saying she is taking hydralazine 3 times a day furosemide because her dermatologist recommended it.    Review of records :    [facility] 9/8/2022 were normal.    Labs from 9/7/2022 reveal CMP with a glucose of 104, potassium 3.5, CBC with a hemoglobin of 12.4.  Cholesterol 238, triglycerides 124, HDL 80, LDL 86..  Labs from 4/24/23 revealed normal proBNP of 500, UA without proteinuria, BMP with normal creatinine and elevated glucose.      Assessment:  :    Preoperative cardiovascular evaluation  Dyspnea on exertion  Leg edema  Hypertensive cardiovascular disease with chronic diastolic heart failure due to preserved EF  Dyslipidemia  Obesity with BMI over 30  Hyperglycemia    Recommendations / Plan:        Reviewed stress Cardiolite results with patient which was negative for ischemia EF of 55%.  Reviewed echo results which revealed normal LV systolic function.  Patient should be okay from cardiovascular standpoint for C-spine surgery.  We will clear her for the same and monitor in cardiology clinic.  Follow-up with  PMD for hyperglycemia.  We will follow-up and consider further evaluation treatment.  Continue follow-up in cardiology clinic.          Procedures    Copied text in this portion of the note has been reviewed and is accurate as of 5/27/2023  The following portions of the patient's history were reviewed and updated as appropriate: allergies, current medications, past family history, past medical history, past social history, past surgical history and problem list.    Assessment:         [facility]     Diagnosis Plan   1. Preoperative cardiovascular examination        2. Hypertensive heart disease with chronic diastolic congestive heart failure        3. Dyslipidemia        4. Obesity (BMI 30-39.9)        5. Cervical spondylosis with myelopathy               Plan:               Past Medical History:  Past Medical History:   Diagnosis Date   • Fibrocystic breast    • Hyperlipidemia    • Hypertension      Past Surgical History:  Past Surgical History:   Procedure Laterality Date   • APPENDECTOMY  1958   • AUGMENTATION MAMMAPLASTY     • BREAST CYST ASPIRATION     • BREAST CYST EXCISION     • HYSTERECTOMY     • TONSILLECTOMY  1958      Allergies:  Allergies   Allergen Reactions   • Nsaids GI Bleeding     Home Meds:  Current Meds:     Current Outpatient Medications:   •  ALBUTEROL SULFATE  (90 Base) MCG/ACT inhaler, INHALE 2 PUFFS BY MOUTH EVERY 4 HOURS AS NEEDED FOR WHEEZING, Disp: 42.5 g, Rfl: 2  •  allopurinol (ZYLOPRIM) 300 MG tablet, TAKE 1 TABLET BY MOUTH  DAILY, Disp: 90 tablet, Rfl: 3  •  atorvastatin (LIPITOR) 20 MG tablet, TAKE 1 TABLET BY MOUTH  DAILY, Disp: 90 tablet, Rfl: 3  •  Cholecalciferol 1000 units tablet, VITAMIN D TABS, Disp: , Rfl:   •  citalopram (CeleXA) 20 MG tablet, TAKE 1 TABLET BY MOUTH  DAILY, Disp: 90 tablet, Rfl: 3  •  fluorometholone (FML) 0.1 % ophthalmic suspension, , Disp: , Rfl:   •  furosemide (LASIX) 40 MG tablet, Take 1 tablet by mouth Daily for 90 days. (Patient taking differently:  Take 1 tablet by mouth Daily As Needed. Pt only takes when having edema), Disp: 90 tablet, Rfl: 2  •  hydrALAZINE (APRESOLINE) 50 MG tablet, Take 1 tablet by mouth 3 (Three) Times a Day for 30 days., Disp: 90 tablet, Rfl: 11  •  l-methylfolate-algae-B12-B6 3-90.314-2-35 MG capsule capsule, Take 1 capsule by mouth 2 (Two) Times a Day., Disp: 180 capsule, Rfl: 3  •  levothyroxine (SYNTHROID, LEVOTHROID) 25 MCG tablet, TAKE 1 TABLET BY MOUTH  DAILY, Disp: 90 tablet, Rfl: 3  •  metoprolol succinate XL (TOPROL-XL) 100 MG 24 hr tablet, TAKE 1 TABLET BY MOUTH  DAILY, Disp: 90 tablet, Rfl: 3  •  montelukast (SINGULAIR) 10 MG tablet, TAKE 1 TABLET BY MOUTH  DAILY, Disp: 90 tablet, Rfl: 3  •  ofloxacin (OCUFLOX) 0.3 % ophthalmic solution, , Disp: , Rfl:   •  omeprazole (priLOSEC) 40 MG capsule, TAKE 1 CAPSULE BY MOUTH  DAILY, Disp: 90 capsule, Rfl: 3  •  potassium chloride (K-DUR,KLOR-CON) 10 MEQ CR tablet, Daily As Needed. Pt only takes when having edema and takes diuretic, Disp: , Rfl:   •  Prolensa 0.07 % solution, , Disp: , Rfl:   •  Symbicort 80-4.5 MCG/ACT inhaler, INHALE 2 INHALATIONS BY  MOUTH TWICE DAILY, Disp: 10.2 g, Rfl: 11  •  triamcinolone (KENALOG) 0.1 % ointment, APPLY TOPICALLY TO LOWER LEG EVERY DAY, Disp: , Rfl:   •  triamcinolone (KENALOG) 0.5 % ointment, APPLY TOPICALLY TO THE  APPROPRIATE AREA AS  DIRECTED TWICE DAILY, Disp: 90 g, Rfl: 1  •  valsartan-hydrochlorothiazide (DIOVAN-HCT) 320-25 MG per tablet, TAKE 1 TABLET BY MOUTH  DAILY (Patient taking differently: Daily As Needed. Pt only taking when bp is high), Disp: 90 tablet, Rfl: 3  •  vitamin E 100 UNIT capsule, VITAMIN E CAPS, Disp: , Rfl:   Social History:   Social History     Tobacco Use   • Smoking status: Former     Packs/day: 1.50     Years: 40.00     Pack years: 60.00     Types: Cigarettes     Start date: 1965     Quit date: 2006     Years since quittin.4   • Smokeless tobacco: Never   Substance Use Topics   • Alcohol use: Yes     " Alcohol/week: 11.0 standard drinks     Types: 4 Glasses of wine, 7 Shots of liquor per week     Comment: daily, vodka tonic      Family History:  Family History   Problem Relation Age of Onset   • Pancreatic cancer Mother    • Stroke Father    • Stroke Sister    • Prostate cancer Brother               Review of Systems   Cardiovascular: Negative for chest pain, leg swelling and palpitations.   Respiratory: Negative for shortness of breath.    Neurological: Negative for dizziness and numbness.     All other systems are negative         Objective:     Physical Exam  /80 (BP Location: Left arm, Patient Position: Sitting, Cuff Size: Adult)   Pulse 81   Ht 157.5 cm (62\")   Wt 81.6 kg (180 lb)   BMI 32.92 kg/m²   General:  Appears in no acute distress  Eyes: Sclera is anicteric,  conjunctiva is clear   HEENT:  No JVD.  No carotid bruits  Respiratory: Respirations regular and unlabored at rest.  Clear to auscultation  Cardiovascular: S1,S2 Regular rate and rhythm. No murmur, rub or gallop auscultated.   Extremities: No digital clubbing or cyanosis, no edema  Skin: Color pink. Skin warm and dry to touch. No rashes  No xanthoma  Neuro: Alert and awake.    Lab Reviewed:         Diego Garza MD  5/27/2023 09:12 EDT      EMR Dragon/Transcription:   \"Dictated utilizing Dragon dictation\".        "

## 2023-05-25 ENCOUNTER — HOSPITAL ENCOUNTER (OUTPATIENT)
Dept: CARDIOLOGY | Facility: HOSPITAL | Age: 73
Discharge: HOME | End: 2023-05-25
Payer: MEDICARE

## 2023-05-25 ENCOUNTER — OFFICE VISIT (OUTPATIENT)
Dept: CARDIOLOGY | Facility: CLINIC | Age: 73
End: 2023-05-25
Payer: MEDICARE

## 2023-05-25 VITALS
SYSTOLIC BLOOD PRESSURE: 124 MMHG | BODY MASS INDEX: 33.13 KG/M2 | HEART RATE: 81 BPM | HEIGHT: 62 IN | WEIGHT: 180 LBS | DIASTOLIC BLOOD PRESSURE: 80 MMHG

## 2023-05-25 DIAGNOSIS — R60.0 EDEMA LEG: ICD-10-CM

## 2023-05-25 DIAGNOSIS — M47.12 CERVICAL SPONDYLOSIS WITH MYELOPATHY: ICD-10-CM

## 2023-05-25 DIAGNOSIS — I11.0 HYPERTENSIVE HEART DISEASE WITH CHRONIC DIASTOLIC CONGESTIVE HEART FAILURE: ICD-10-CM

## 2023-05-25 DIAGNOSIS — Z01.810 PREOPERATIVE CARDIOVASCULAR EXAMINATION: ICD-10-CM

## 2023-05-25 DIAGNOSIS — E66.9 OBESITY (BMI 30-39.9): ICD-10-CM

## 2023-05-25 DIAGNOSIS — E78.5 DYSLIPIDEMIA: ICD-10-CM

## 2023-05-25 DIAGNOSIS — I50.32 HYPERTENSIVE HEART DISEASE WITH CHRONIC DIASTOLIC CONGESTIVE HEART FAILURE: ICD-10-CM

## 2023-05-25 DIAGNOSIS — Z01.810 PREOPERATIVE CARDIOVASCULAR EXAMINATION: Primary | ICD-10-CM

## 2023-05-25 DIAGNOSIS — R06.09 DYSPNEA ON EXERTION: ICD-10-CM

## 2023-05-25 LAB
BH CV ECHO MEAS - ACS: 2.16 CM
BH CV ECHO MEAS - AO MAX PG: 9.3 MMHG
BH CV ECHO MEAS - AO MEAN PG: 5.7 MMHG
BH CV ECHO MEAS - AO ROOT DIAM: 3.3 CM
BH CV ECHO MEAS - AO V2 MAX: 152.8 CM/SEC
BH CV ECHO MEAS - AO V2 VTI: 32.5 CM
BH CV ECHO MEAS - AVA(I,D): 1.72 CM2
BH CV ECHO MEAS - EDV(CUBED): 166.7 ML
BH CV ECHO MEAS - EDV(MOD-SP4): 49.3 ML
BH CV ECHO MEAS - EF(MOD-BP): 47 %
BH CV ECHO MEAS - EF(MOD-SP4): 47.5 %
BH CV ECHO MEAS - EPSS: 0.88 CM
BH CV ECHO MEAS - ESV(CUBED): 55.9 ML
BH CV ECHO MEAS - ESV(MOD-SP4): 25.9 ML
BH CV ECHO MEAS - FS: 30.5 %
BH CV ECHO MEAS - IVS/LVPW: 0.9 CM
BH CV ECHO MEAS - IVSD: 0.84 CM
BH CV ECHO MEAS - LA DIMENSION: 3 CM
BH CV ECHO MEAS - LV DIASTOLIC VOL/BSA (35-75): 26.9 CM2
BH CV ECHO MEAS - LV MASS(C)D: 182.8 GRAMS
BH CV ECHO MEAS - LV MAX PG: 2.5 MMHG
BH CV ECHO MEAS - LV MEAN PG: 1.57 MMHG
BH CV ECHO MEAS - LV SYSTOLIC VOL/BSA (12-30): 14.2 CM2
BH CV ECHO MEAS - LV V1 MAX: 79.2 CM/SEC
BH CV ECHO MEAS - LV V1 VTI: 17.5 CM
BH CV ECHO MEAS - LVIDD: 5.5 CM
BH CV ECHO MEAS - LVIDS: 3.8 CM
BH CV ECHO MEAS - LVOT AREA: 3.2 CM2
BH CV ECHO MEAS - LVOT DIAM: 2.02 CM
BH CV ECHO MEAS - LVPWD: 0.93 CM
BH CV ECHO MEAS - MV A MAX VEL: 119.2 CM/SEC
BH CV ECHO MEAS - MV DEC SLOPE: 343.7 CM/SEC2
BH CV ECHO MEAS - MV DEC TIME: 0.22 MSEC
BH CV ECHO MEAS - MV E MAX VEL: 76.1 CM/SEC
BH CV ECHO MEAS - MV E/A: 0.64
BH CV ECHO MEAS - MV MAX PG: 5.8 MMHG
BH CV ECHO MEAS - MV MEAN PG: 2.5 MMHG
BH CV ECHO MEAS - MV V2 VTI: 24.9 CM
BH CV ECHO MEAS - MVA(VTI): 2.24 CM2
BH CV ECHO MEAS - PA ACC TIME: 0.12 SEC
BH CV ECHO MEAS - PA PR(ACCEL): 22.9 MMHG
BH CV ECHO MEAS - PULM A REVS DUR: 0.11 SEC
BH CV ECHO MEAS - PULM A REVS VEL: 32.8 CM/SEC
BH CV ECHO MEAS - PULM DIAS VEL: 50.2 CM/SEC
BH CV ECHO MEAS - PULM S/D: 1.43
BH CV ECHO MEAS - PULM SYS VEL: 71.7 CM/SEC
BH CV ECHO MEAS - RAP SYSTOLE: 3 MMHG
BH CV ECHO MEAS - RV MAX PG: 2.9 MMHG
BH CV ECHO MEAS - RV V1 MAX: 85.1 CM/SEC
BH CV ECHO MEAS - RV V1 VTI: 15.4 CM
BH CV ECHO MEAS - RVDD: 3.5 CM
BH CV ECHO MEAS - SI(MOD-SP4): 12.8 ML/M2
BH CV ECHO MEAS - SV(LVOT): 55.8 ML
BH CV ECHO MEAS - SV(MOD-SP4): 23.4 ML
MAXIMAL PREDICTED HEART RATE: 145 BPM
STRESS TARGET HR: 123 BPM

## 2023-05-25 PROCEDURE — 0 TECHNETIUM SESTAMIBI: Performed by: INTERNAL MEDICINE

## 2023-05-25 PROCEDURE — 78452 HT MUSCLE IMAGE SPECT MULT: CPT

## 2023-05-25 PROCEDURE — 25010000002 REGADENOSON 0.4 MG/5ML SOLUTION: Performed by: INTERNAL MEDICINE

## 2023-05-25 PROCEDURE — 93017 CV STRESS TEST TRACING ONLY: CPT

## 2023-05-25 PROCEDURE — A9500 TC99M SESTAMIBI: HCPCS | Performed by: INTERNAL MEDICINE

## 2023-05-25 PROCEDURE — 93306 TTE W/DOPPLER COMPLETE: CPT

## 2023-05-25 RX ORDER — REGADENOSON 0.08 MG/ML
0.4 INJECTION, SOLUTION INTRAVENOUS
Status: COMPLETED | OUTPATIENT
Start: 2023-05-25 | End: 2023-05-25

## 2023-05-25 RX ADMIN — REGADENOSON 0.4 MG: 0.08 INJECTION, SOLUTION INTRAVENOUS at 13:56

## 2023-05-25 RX ADMIN — TECHNETIUM TC 99M SESTAMIBI 1 DOSE: 1 INJECTION INTRAVENOUS at 13:56

## 2023-05-25 RX ADMIN — TECHNETIUM TC 99M SESTAMIBI 1 DOSE: 1 INJECTION INTRAVENOUS at 12:06

## 2023-05-27 LAB
BH CV REST NUCLEAR ISOTOPE DOSE: 10.3 MCI
BH CV STRESS BP STAGE 1: NORMAL
BH CV STRESS COMMENTS STAGE 1: NORMAL
BH CV STRESS DOSE REGADENOSON STAGE 1: 0.4
BH CV STRESS DURATION MIN STAGE 1: 0
BH CV STRESS DURATION SEC STAGE 1: 10
BH CV STRESS HR STAGE 1: 80
BH CV STRESS NUCLEAR ISOTOPE DOSE: 32.7 MCI
BH CV STRESS PROTOCOL 1: NORMAL
BH CV STRESS RECOVERY BP: NORMAL MMHG
BH CV STRESS RECOVERY HR: 99 BPM
BH CV STRESS STAGE 1: 1
MAXIMAL PREDICTED HEART RATE: 145 BPM
STRESS BASELINE BP: NORMAL MMHG
STRESS BASELINE HR: 80 BPM
STRESS TARGET HR: 123 BPM

## 2023-06-19 ENCOUNTER — TELEPHONE (OUTPATIENT)
Dept: FAMILY MEDICINE CLINIC | Facility: CLINIC | Age: 73
End: 2023-06-19

## 2023-06-19 NOTE — TELEPHONE ENCOUNTER
Caller: Lori Sutton    Relationship: Self    Best call back number: [phone]    What medications are you currently taking:   Current Outpatient Medications on File Prior to Visit   Medication Sig Dispense Refill    ALBUTEROL SULFATE  (90 Base) MCG/ACT inhaler INHALE 2 PUFFS BY MOUTH EVERY 4 HOURS AS NEEDED FOR WHEEZING 42.5 g 2    allopurinol (ZYLOPRIM) 300 MG tablet TAKE 1 TABLET BY MOUTH  DAILY 90 tablet 3    atorvastatin (LIPITOR) 20 MG tablet TAKE 1 TABLET BY MOUTH  DAILY 90 tablet 3    Cholecalciferol 1000 units tablet VITAMIN D TABS      citalopram (CeleXA) 20 MG tablet TAKE 1 TABLET BY MOUTH  DAILY 90 tablet 3    fluorometholone (FML) 0.1 % ophthalmic suspension       furosemide (LASIX) 40 MG tablet Take 1 tablet by mouth Daily for 90 days. (Patient taking differently: Take 1 tablet by mouth Daily As Needed. Pt only takes when having edema) 90 tablet 2    hydrALAZINE (APRESOLINE) 50 MG tablet Take 1 tablet by mouth 3 (Three) Times a Day for 30 days. 90 tablet 11    l-methylfolate-algae-B12-B6 3-90.314-2-35 MG capsule capsule Take 1 capsule by mouth 2 (Two) Times a Day. 180 capsule 3    levothyroxine (SYNTHROID, LEVOTHROID) 25 MCG tablet TAKE 1 TABLET BY MOUTH  DAILY 90 tablet 3    metoprolol succinate XL (TOPROL-XL) 100 MG 24 hr tablet TAKE 1 TABLET BY MOUTH  DAILY 90 tablet 3    montelukast (SINGULAIR) 10 MG tablet TAKE 1 TABLET BY MOUTH  DAILY 90 tablet 3    ofloxacin (OCUFLOX) 0.3 % ophthalmic solution       omeprazole (priLOSEC) 40 MG capsule TAKE 1 CAPSULE BY MOUTH  DAILY 90 capsule 3    potassium chloride (K-DUR,KLOR-CON) 10 MEQ CR tablet Daily As Needed. Pt only takes when having edema and takes diuretic      Prolensa 0.07 % solution       Symbicort 80-4.5 MCG/ACT inhaler INHALE 2 INHALATIONS BY  MOUTH TWICE DAILY 10.2 g 11    triamcinolone (KENALOG) 0.1 % ointment APPLY TOPICALLY TO LOWER LEG EVERY DAY      triamcinolone (KENALOG) 0.5 % ointment APPLY TOPICALLY TO THE  APPROPRIATE  AREA AS  DIRECTED TWICE DAILY 90 g 1    valsartan-hydrochlorothiazide (DIOVAN-HCT) 320-25 MG per tablet TAKE 1 TABLET BY MOUTH  DAILY (Patient taking differently: Daily As Needed. Pt only taking when bp is high) 90 tablet 3    vitamin E 100 UNIT capsule VITAMIN E CAPS       No current facility-administered medications on file prior to visit.        Which medication are you concerned about: VALSARTAN    Who prescribed you this medication: DOCTOR QUEEN    What are your concerns:     BLOOD PRESSURE DROPPED TO 79/24 AND IS NOW NOW LONGER TAKING THE VALSARTAN EVERYDAY.     WANTED TO LET DOCTOR QUEEN KNOW.

## 2023-07-05 ENCOUNTER — HOSPITAL ENCOUNTER (OUTPATIENT)
Facility: HOSPITAL | Age: 73
Setting detail: SURGERY ADMIT
DRG: 455 | End: 2023-07-05
Attending: NEUROLOGICAL SURGERY | Admitting: NEUROLOGICAL SURGERY
Payer: MEDICARE

## 2023-07-21 ENCOUNTER — TELEPHONE (OUTPATIENT)
Dept: FAMILY MEDICINE CLINIC | Facility: CLINIC | Age: 73
End: 2023-07-21

## 2023-07-21 ENCOUNTER — TELEPHONE (OUTPATIENT)
Dept: NEUROSURGERY | Facility: CLINIC | Age: 73
End: 2023-07-21
Payer: MEDICARE

## 2023-07-21 NOTE — TELEPHONE ENCOUNTER
Caller: Lori Sutton    Relationship: Self    Best call back number: [phone]     What medication are you requesting: NEBULIZER MACHINE, HOSES, MEDICATION    What are your current symptoms: CONGESTION= SHE WANTS TO OBTAIN RELIEF AS SHE HAS SURGERY SCHEDULED 7/31/23.  NOTE SHE HAS ASTHMA AND COPD    How long have you been experiencing symptoms:     Have you had these symptoms before:    [x] Yes  [] No    Have you been treated for these symptoms before:   [x] Yes  [] No    If a prescription is needed, what is your preferred pharmacy and phone number:    [id number] - [place], [place] - [address] AT [address] [phone]  - [phone] FX     Additional notes:

## 2023-07-21 NOTE — TELEPHONE ENCOUNTER
Called patient and informed her that Cassidy is off until Monday and that I will send over a message for her to get a call back. Patient verbalized understanding.    No bruits; no thyromegaly or nodules

## 2023-07-21 NOTE — TELEPHONE ENCOUNTER
"  Caller: SAGE ROBERTSON    Relationship: SELF    Best call back number: [phone]    What was the call regarding: PATIENT CALLED WITH SURGERY QUESTIONS.  PATIENT IS SCHEDULED FOR SURGERY 07/31/2023.  PATIENT WAS WANTING TO KNOW WHAT TO EXPECT AND A \"BALL [place]ARK\" FIGURE FOR HOW LONG SHE WILL BE IN REHAB AFTER     PLEASE CALL PATIENT   THANK YOU  "

## 2023-07-24 RX ORDER — CHLORAL HYDRATE 500 MG
1000 CAPSULE ORAL
COMMUNITY
End: 2023-08-16

## 2023-07-24 RX ORDER — FAMOTIDINE 40 MG/5ML
20 POWDER, FOR SUSPENSION ORAL 2 TIMES DAILY
Status: ON HOLD | COMMUNITY
End: 2023-07-31

## 2023-07-24 NOTE — TELEPHONE ENCOUNTER
RETURNED CALL TO PATIENT AND LET HER KNOW THAT EVERY PATIENTS IS DIFFERENT AND REHAB DEPENDS ON HOW WELL SHE DOES. PATIENT VERBALLY UNDERSTANDS

## 2023-07-26 DIAGNOSIS — J45.40 MODERATE PERSISTENT REACTIVE AIRWAY DISEASE WITHOUT COMPLICATION: Primary | ICD-10-CM

## 2023-07-27 ENCOUNTER — TELEPHONE (OUTPATIENT)
Dept: FAMILY MEDICINE CLINIC | Facility: CLINIC | Age: 73
End: 2023-07-27
Payer: MEDICARE

## 2023-07-27 NOTE — TELEPHONE ENCOUNTER
----- Message from Ayaz Luna MD sent at 7/26/2023  6:48 PM EDT -----  Ladies can you please get this order for a home nebulizer unit with tubing, mouthpieces and medicine cups to [facility] in [place] on [address] at [place] [address] in [facility].  It will not go across electronically.  It prints out and then I guess we have to fax it?  Maybe we can just call it to them I do not know.  She has the medication she needs for the nebulization but not the nebulizer itself.  What ever we need to do to get it to these people so she can get started on this for the weekend.

## 2023-07-27 NOTE — TELEPHONE ENCOUNTER
Caller: Lori Sutton    Relationship: Self    Best call back number: [phone]    What orders are you requesting (i.e. lab or imaging): NEBULIZER MACHINE     In what timeframe would the patient need to come in: ASAP    Where will you receive your lab/imaging services:     [address] - [phone]  - [phone]  [phone]     Additional notes:     PATIENT CALLED AND SAID [facility] DOES NOT KEEP NEBULIZER MACHINES IN STOCK AND TOLD HER SHE NEEDS TO HAVE THE ORDER SENT TO [facility]    LYNNETTE GAVE HER THIS FAX NUMBER      FAX: [phone]

## 2023-07-28 ENCOUNTER — TELEPHONE (OUTPATIENT)
Dept: FAMILY MEDICINE CLINIC | Facility: CLINIC | Age: 73
End: 2023-07-28
Payer: MEDICARE

## 2023-07-28 NOTE — TELEPHONE ENCOUNTER
----- Message from Ayaz Luna MD sent at 7/28/2023  2:19 PM EDT -----  Shanell check and make sure that Lori got her nebulizer from the pharmacy.  We were supposed to fax over the order for it but I do not see any record that it went through yet.  It would not go electronically.  I guess because it is a medical device and not a medication.  It would not go across electronically

## 2023-07-28 NOTE — TELEPHONE ENCOUNTER
----- Message from yAaz Luna MD sent at 7/27/2023  8:33 PM EDT -----  Was somebody able to fax over the order for a home nebulizer unit for Lori Thursday?

## 2023-07-31 ENCOUNTER — APPOINTMENT (OUTPATIENT)
Dept: GENERAL RADIOLOGY | Facility: HOSPITAL | Age: 73
DRG: 455 | End: 2023-07-31
Payer: MEDICARE

## 2023-07-31 ENCOUNTER — ANESTHESIA EVENT (OUTPATIENT)
Dept: PERIOP | Facility: HOSPITAL | Age: 73
DRG: 455 | End: 2023-07-31
Payer: MEDICARE

## 2023-07-31 ENCOUNTER — APPOINTMENT (OUTPATIENT)
Dept: CT IMAGING | Facility: HOSPITAL | Age: 73
DRG: 455 | End: 2023-07-31
Payer: MEDICARE

## 2023-07-31 ENCOUNTER — ANESTHESIA (OUTPATIENT)
Dept: PERIOP | Facility: HOSPITAL | Age: 73
DRG: 455 | End: 2023-07-31
Payer: MEDICARE

## 2023-07-31 ENCOUNTER — HOSPITAL ENCOUNTER (INPATIENT)
Facility: HOSPITAL | Age: 73
LOS: 4 days | Discharge: NURSING, REHAB OR LONG-TERM CARE | DRG: 455 | End: 2023-08-04
Attending: NEUROLOGICAL SURGERY | Admitting: NEUROLOGICAL SURGERY
Payer: MEDICARE

## 2023-07-31 DIAGNOSIS — M47.12 CERVICAL SPONDYLOSIS WITH MYELOPATHY: ICD-10-CM

## 2023-07-31 PROCEDURE — C1713 ANCHOR/SCREW BN/BN,TIS/BN: HCPCS | Performed by: NEUROLOGICAL SURGERY

## 2023-07-31 PROCEDURE — 25010000002 METHYLPREDNISOLONE PER 40 MG: Performed by: NEUROLOGICAL SURGERY

## 2023-07-31 PROCEDURE — 22845 INSERT SPINE FIXATION DEVICE: CPT | Performed by: NEUROLOGICAL SURGERY

## 2023-07-31 PROCEDURE — L0172 CERV COL SR FOAM 2PC PRE OTS: HCPCS | Performed by: NEUROLOGICAL SURGERY

## 2023-07-31 PROCEDURE — 25010000002 SUCCINYLCHOLINE PER 20 MG: Performed by: ANESTHESIOLOGIST ASSISTANT

## 2023-07-31 PROCEDURE — 63081 REMOVE VERT BODY DCMPRN CRVL: CPT | Performed by: NEUROLOGICAL SURGERY

## 2023-07-31 PROCEDURE — 72125 CT NECK SPINE W/O DYE: CPT

## 2023-07-31 PROCEDURE — 76000 FLUOROSCOPY <1 HR PHYS/QHP: CPT

## 2023-07-31 PROCEDURE — 22554 ARTHRD ANT NTRBD MIN DSC CRV: CPT | Performed by: NEUROLOGICAL SURGERY

## 2023-07-31 PROCEDURE — 22585 ARTHRD ANT NTRBD MIN DSC EA: CPT | Performed by: NEUROLOGICAL SURGERY

## 2023-07-31 PROCEDURE — 25010000002 PROPOFOL 1000 MG/100ML EMULSION: Performed by: ANESTHESIOLOGIST ASSISTANT

## 2023-07-31 PROCEDURE — 0RT30ZZ RESECTION OF CERVICAL VERTEBRAL DISC, OPEN APPROACH: ICD-10-PCS | Performed by: NEUROLOGICAL SURGERY

## 2023-07-31 PROCEDURE — 0 LIDOCAINE 1 % SOLUTION: Performed by: ANESTHESIOLOGY

## 2023-07-31 PROCEDURE — 25010000002 HYDROMORPHONE 1 MG/ML SOLUTION

## 2023-07-31 PROCEDURE — 22854 INSJ BIOMECHANICAL DEVICE: CPT | Performed by: NEUROLOGICAL SURGERY

## 2023-07-31 PROCEDURE — 25010000002 FENTANYL CITRATE (PF) 100 MCG/2ML SOLUTION: Performed by: ANESTHESIOLOGIST ASSISTANT

## 2023-07-31 PROCEDURE — 25010000002 PROPOFOL 10 MG/ML EMULSION: Performed by: ANESTHESIOLOGIST ASSISTANT

## 2023-07-31 PROCEDURE — 25010000002 MIDAZOLAM PER 1 MG: Performed by: ANESTHESIOLOGIST ASSISTANT

## 2023-07-31 PROCEDURE — 72040 X-RAY EXAM NECK SPINE 2-3 VW: CPT

## 2023-07-31 PROCEDURE — 25010000002 HYDROMORPHONE 1 MG/ML SOLUTION: Performed by: ANESTHESIOLOGIST ASSISTANT

## 2023-07-31 PROCEDURE — 25010000002 VANCOMYCIN 1 G RECONSTITUTED SOLUTION: Performed by: NEUROLOGICAL SURGERY

## 2023-07-31 PROCEDURE — 25010000002 CEFAZOLIN PER 500 MG: Performed by: NEUROLOGICAL SURGERY

## 2023-07-31 PROCEDURE — 0RG10A0 FUSION OF CERVICAL VERTEBRAL JOINT WITH INTERBODY FUSION DEVICE, ANTERIOR APPROACH, ANTERIOR COLUMN, OPEN APPROACH: ICD-10-PCS | Performed by: NEUROLOGICAL SURGERY

## 2023-07-31 DEVICE — XTEND ANTERIOR CERVICAL PLATE, 2-LEVEL, 28MM
Type: IMPLANTABLE DEVICE | Site: SPINE CERVICAL | Status: FUNCTIONAL
Brand: XTEND

## 2023-07-31 DEVICE — I-FACTOR PUTTY, 1.0CC SYRINGE
Type: IMPLANTABLE DEVICE | Site: SPINE CERVICAL | Status: FUNCTIONAL
Brand: I-FACTOR PEPTIDE ENHANCED BONE GRAFT

## 2023-07-31 DEVICE — FLOSEAL HEMOSTATIC MATRIX, 10ML
Type: IMPLANTABLE DEVICE | Site: SPINE CERVICAL | Status: FUNCTIONAL
Brand: FLOSEAL HEMOSTATIC MATRIX

## 2023-07-31 DEVICE — DEV CONTRL TISS STRATAFIX SPIRAL MNCRYL UD 3/0 PLS 30CM: Type: IMPLANTABLE DEVICE | Site: SPINE CERVICAL | Status: FUNCTIONAL

## 2023-07-31 DEVICE — STRUT 6241041 ANATOMIC 14X11X5MM
Type: IMPLANTABLE DEVICE | Site: SPINE CERVICAL | Status: FUNCTIONAL
Brand: VERTE-STACK® SPINAL SYSTEM

## 2023-07-31 DEVICE — VIP 4.6MM VARIABLE ANGLE SCREW, SELF-DRILLING, 12MM
Type: IMPLANTABLE DEVICE | Site: SPINE CERVICAL | Status: FUNCTIONAL
Brand: VIP

## 2023-07-31 RX ORDER — VANCOMYCIN HYDROCHLORIDE 1 G/20ML
INJECTION, POWDER, LYOPHILIZED, FOR SOLUTION INTRAVENOUS AS NEEDED
Status: DISCONTINUED | OUTPATIENT
Start: 2023-07-31 | End: 2023-07-31 | Stop reason: HOSPADM

## 2023-07-31 RX ORDER — BUDESONIDE 0.5 MG/2ML
0.5 INHALANT ORAL 2 TIMES DAILY
Status: DISCONTINUED | OUTPATIENT
Start: 2023-07-31 | End: 2023-07-31 | Stop reason: SDUPTHER

## 2023-07-31 RX ORDER — PROPOFOL 10 MG/ML
VIAL (ML) INTRAVENOUS AS NEEDED
Status: DISCONTINUED | OUTPATIENT
Start: 2023-07-31 | End: 2023-07-31 | Stop reason: SURG

## 2023-07-31 RX ORDER — FAMOTIDINE 20 MG/1
20 TABLET, FILM COATED ORAL 2 TIMES DAILY
Status: ON HOLD | COMMUNITY
End: 2023-08-01

## 2023-07-31 RX ORDER — SODIUM CHLORIDE 0.9 % (FLUSH) 0.9 %
10 SYRINGE (ML) INJECTION AS NEEDED
Status: DISCONTINUED | OUTPATIENT
Start: 2023-07-31 | End: 2023-08-04 | Stop reason: HOSPADM

## 2023-07-31 RX ORDER — VALSARTAN 80 MG/1
320 TABLET ORAL
Status: DISCONTINUED | OUTPATIENT
Start: 2023-07-31 | End: 2023-08-04 | Stop reason: HOSPADM

## 2023-07-31 RX ORDER — METHOCARBAMOL 750 MG/1
750 TABLET, FILM COATED ORAL 4 TIMES DAILY PRN
Status: DISCONTINUED | OUTPATIENT
Start: 2023-07-31 | End: 2023-08-03

## 2023-07-31 RX ORDER — LIDOCAINE HYDROCHLORIDE 10 MG/ML
0.5 INJECTION, SOLUTION INFILTRATION; PERINEURAL ONCE AS NEEDED
Status: COMPLETED | OUTPATIENT
Start: 2023-07-31 | End: 2023-07-31

## 2023-07-31 RX ORDER — LIDOCAINE HYDROCHLORIDE AND EPINEPHRINE 10; 10 MG/ML; UG/ML
INJECTION, SOLUTION INFILTRATION; PERINEURAL AS NEEDED
Status: DISCONTINUED | OUTPATIENT
Start: 2023-07-31 | End: 2023-07-31 | Stop reason: HOSPADM

## 2023-07-31 RX ORDER — LIDOCAINE HYDROCHLORIDE 10 MG/ML
INJECTION, SOLUTION EPIDURAL; INFILTRATION; INTRACAUDAL; PERINEURAL AS NEEDED
Status: DISCONTINUED | OUTPATIENT
Start: 2023-07-31 | End: 2023-07-31 | Stop reason: SURG

## 2023-07-31 RX ORDER — FENTANYL CITRATE 50 UG/ML
50 INJECTION, SOLUTION INTRAMUSCULAR; INTRAVENOUS
Status: DISCONTINUED | OUTPATIENT
Start: 2023-07-31 | End: 2023-07-31 | Stop reason: HOSPADM

## 2023-07-31 RX ORDER — SODIUM CHLORIDE 0.9 % (FLUSH) 0.9 %
10 SYRINGE (ML) INJECTION AS NEEDED
Status: DISCONTINUED | OUTPATIENT
Start: 2023-07-31 | End: 2023-07-31 | Stop reason: HOSPADM

## 2023-07-31 RX ORDER — SODIUM CHLORIDE 9 MG/ML
40 INJECTION, SOLUTION INTRAVENOUS AS NEEDED
Status: DISCONTINUED | OUTPATIENT
Start: 2023-07-31 | End: 2023-08-04 | Stop reason: HOSPADM

## 2023-07-31 RX ORDER — REMIFENTANIL HYDROCHLORIDE 1 MG/ML
INJECTION, POWDER, LYOPHILIZED, FOR SOLUTION INTRAVENOUS CONTINUOUS PRN
Status: DISCONTINUED | OUTPATIENT
Start: 2023-07-31 | End: 2023-07-31 | Stop reason: SURG

## 2023-07-31 RX ORDER — MIDAZOLAM HYDROCHLORIDE 1 MG/ML
1 INJECTION INTRAMUSCULAR; INTRAVENOUS
Status: DISCONTINUED | OUTPATIENT
Start: 2023-07-31 | End: 2023-07-31 | Stop reason: HOSPADM

## 2023-07-31 RX ORDER — PHENYLEPHRINE HCL IN 0.9% NACL 1 MG/10 ML
SYRINGE (ML) INTRAVENOUS AS NEEDED
Status: DISCONTINUED | OUTPATIENT
Start: 2023-07-31 | End: 2023-07-31 | Stop reason: SURG

## 2023-07-31 RX ORDER — FAMOTIDINE 20 MG/1
20 TABLET, FILM COATED ORAL 2 TIMES DAILY
Status: DISCONTINUED | OUTPATIENT
Start: 2023-07-31 | End: 2023-08-04 | Stop reason: HOSPADM

## 2023-07-31 RX ORDER — PROCHLORPERAZINE EDISYLATE 5 MG/ML
10 INJECTION INTRAMUSCULAR; INTRAVENOUS ONCE AS NEEDED
Status: DISCONTINUED | OUTPATIENT
Start: 2023-07-31 | End: 2023-07-31 | Stop reason: HOSPADM

## 2023-07-31 RX ORDER — CITALOPRAM 20 MG/1
20 TABLET ORAL DAILY
Status: DISCONTINUED | OUTPATIENT
Start: 2023-07-31 | End: 2023-08-04 | Stop reason: HOSPADM

## 2023-07-31 RX ORDER — CHOLECALCIFEROL (VITAMIN D3) 125 MCG
5 CAPSULE ORAL NIGHTLY PRN
Status: DISCONTINUED | OUTPATIENT
Start: 2023-07-31 | End: 2023-08-04 | Stop reason: HOSPADM

## 2023-07-31 RX ORDER — ACETAMINOPHEN 325 MG/1
650 TABLET ORAL EVERY 4 HOURS PRN
Status: DISCONTINUED | OUTPATIENT
Start: 2023-07-31 | End: 2023-08-04 | Stop reason: HOSPADM

## 2023-07-31 RX ORDER — ACETAMINOPHEN 500 MG
1000 TABLET ORAL ONCE
Status: COMPLETED | OUTPATIENT
Start: 2023-07-31 | End: 2023-07-31

## 2023-07-31 RX ORDER — HYDROCHLOROTHIAZIDE 25 MG/1
25 TABLET ORAL
Status: DISCONTINUED | OUTPATIENT
Start: 2023-07-31 | End: 2023-08-04 | Stop reason: HOSPADM

## 2023-07-31 RX ORDER — MONTELUKAST SODIUM 10 MG/1
10 TABLET ORAL DAILY
Status: DISCONTINUED | OUTPATIENT
Start: 2023-07-31 | End: 2023-08-04 | Stop reason: HOSPADM

## 2023-07-31 RX ORDER — IPRATROPIUM BROMIDE AND ALBUTEROL SULFATE 2.5; .5 MG/3ML; MG/3ML
3 SOLUTION RESPIRATORY (INHALATION) ONCE AS NEEDED
Status: DISCONTINUED | OUTPATIENT
Start: 2023-07-31 | End: 2023-07-31 | Stop reason: HOSPADM

## 2023-07-31 RX ORDER — OXYCODONE HYDROCHLORIDE 5 MG/1
5 TABLET ORAL ONCE
Status: COMPLETED | OUTPATIENT
Start: 2023-07-31 | End: 2023-07-31

## 2023-07-31 RX ORDER — ALBUTEROL SULFATE 90 UG/1
2 AEROSOL, METERED RESPIRATORY (INHALATION) EVERY 4 HOURS PRN
Status: DISCONTINUED | OUTPATIENT
Start: 2023-07-31 | End: 2023-08-04 | Stop reason: HOSPADM

## 2023-07-31 RX ORDER — BUDESONIDE AND FORMOTEROL FUMARATE DIHYDRATE 160; 4.5 UG/1; UG/1
1 AEROSOL RESPIRATORY (INHALATION)
Status: DISCONTINUED | OUTPATIENT
Start: 2023-07-31 | End: 2023-08-04 | Stop reason: HOSPADM

## 2023-07-31 RX ORDER — EPHEDRINE SULFATE 5 MG/ML
INJECTION INTRAVENOUS AS NEEDED
Status: DISCONTINUED | OUTPATIENT
Start: 2023-07-31 | End: 2023-07-31 | Stop reason: SURG

## 2023-07-31 RX ORDER — PROPOFOL 10 MG/ML
INJECTION, EMULSION INTRAVENOUS CONTINUOUS PRN
Status: DISCONTINUED | OUTPATIENT
Start: 2023-07-31 | End: 2023-07-31 | Stop reason: SURG

## 2023-07-31 RX ORDER — DOCUSATE SODIUM 100 MG/1
100 CAPSULE, LIQUID FILLED ORAL 2 TIMES DAILY PRN
Status: DISCONTINUED | OUTPATIENT
Start: 2023-07-31 | End: 2023-08-03

## 2023-07-31 RX ORDER — LABETALOL HYDROCHLORIDE 5 MG/ML
5 INJECTION, SOLUTION INTRAVENOUS
Status: DISCONTINUED | OUTPATIENT
Start: 2023-07-31 | End: 2023-07-31 | Stop reason: HOSPADM

## 2023-07-31 RX ORDER — EPHEDRINE SULFATE 5 MG/ML
5 INJECTION INTRAVENOUS ONCE AS NEEDED
Status: DISCONTINUED | OUTPATIENT
Start: 2023-07-31 | End: 2023-07-31 | Stop reason: HOSPADM

## 2023-07-31 RX ORDER — HYDRALAZINE HYDROCHLORIDE 25 MG/1
50 TABLET, FILM COATED ORAL 3 TIMES DAILY
Status: DISCONTINUED | OUTPATIENT
Start: 2023-07-31 | End: 2023-08-04 | Stop reason: HOSPADM

## 2023-07-31 RX ORDER — ONDANSETRON 4 MG/1
4 TABLET, FILM COATED ORAL EVERY 6 HOURS PRN
Status: DISCONTINUED | OUTPATIENT
Start: 2023-07-31 | End: 2023-08-04 | Stop reason: HOSPADM

## 2023-07-31 RX ORDER — PANTOPRAZOLE SODIUM 40 MG/1
40 TABLET, DELAYED RELEASE ORAL
Status: DISCONTINUED | OUTPATIENT
Start: 2023-08-01 | End: 2023-08-04 | Stop reason: HOSPADM

## 2023-07-31 RX ORDER — IPRATROPIUM BROMIDE AND ALBUTEROL SULFATE 2.5; .5 MG/3ML; MG/3ML
3 SOLUTION RESPIRATORY (INHALATION) EVERY 4 HOURS PRN
Status: DISCONTINUED | OUTPATIENT
Start: 2023-07-31 | End: 2023-08-04 | Stop reason: HOSPADM

## 2023-07-31 RX ORDER — SODIUM CHLORIDE, SODIUM LACTATE, POTASSIUM CHLORIDE, CALCIUM CHLORIDE 600; 310; 30; 20 MG/100ML; MG/100ML; MG/100ML; MG/100ML
INJECTION, SOLUTION INTRAVENOUS CONTINUOUS PRN
Status: DISCONTINUED | OUTPATIENT
Start: 2023-07-31 | End: 2023-07-31 | Stop reason: SURG

## 2023-07-31 RX ORDER — HYDROCODONE BITARTRATE AND ACETAMINOPHEN 5; 325 MG/1; MG/1
1 TABLET ORAL ONCE AS NEEDED
Status: DISCONTINUED | OUTPATIENT
Start: 2023-07-31 | End: 2023-07-31

## 2023-07-31 RX ORDER — DIPHENHYDRAMINE HYDROCHLORIDE 50 MG/ML
12.5 INJECTION INTRAMUSCULAR; INTRAVENOUS
Status: DISCONTINUED | OUTPATIENT
Start: 2023-07-31 | End: 2023-07-31 | Stop reason: HOSPADM

## 2023-07-31 RX ORDER — MIDAZOLAM HYDROCHLORIDE 1 MG/ML
INJECTION INTRAMUSCULAR; INTRAVENOUS AS NEEDED
Status: DISCONTINUED | OUTPATIENT
Start: 2023-07-31 | End: 2023-07-31 | Stop reason: SURG

## 2023-07-31 RX ORDER — LEVOTHYROXINE SODIUM 0.03 MG/1
25 TABLET ORAL DAILY
Status: DISCONTINUED | OUTPATIENT
Start: 2023-07-31 | End: 2023-08-04 | Stop reason: HOSPADM

## 2023-07-31 RX ORDER — DIPHENHYDRAMINE HCL 25 MG
25 CAPSULE ORAL
Status: DISCONTINUED | OUTPATIENT
Start: 2023-07-31 | End: 2023-07-31 | Stop reason: HOSPADM

## 2023-07-31 RX ORDER — DIPHENHYDRAMINE HYDROCHLORIDE 50 MG/ML
12.5 INJECTION INTRAMUSCULAR; INTRAVENOUS ONCE AS NEEDED
Status: DISCONTINUED | OUTPATIENT
Start: 2023-07-31 | End: 2023-07-31 | Stop reason: HOSPADM

## 2023-07-31 RX ORDER — NALOXONE HCL 0.4 MG/ML
0.4 VIAL (ML) INJECTION
Status: DISCONTINUED | OUTPATIENT
Start: 2023-07-31 | End: 2023-08-04 | Stop reason: HOSPADM

## 2023-07-31 RX ORDER — KETAMINE HCL IN NACL, ISO-OSM 100MG/10ML
SYRINGE (ML) INJECTION AS NEEDED
Status: DISCONTINUED | OUTPATIENT
Start: 2023-07-31 | End: 2023-07-31 | Stop reason: SURG

## 2023-07-31 RX ORDER — BISACODYL 10 MG
10 SUPPOSITORY, RECTAL RECTAL DAILY PRN
Status: DISCONTINUED | OUTPATIENT
Start: 2023-07-31 | End: 2023-08-04 | Stop reason: HOSPADM

## 2023-07-31 RX ORDER — METOPROLOL SUCCINATE 50 MG/1
100 TABLET, EXTENDED RELEASE ORAL DAILY
Status: DISCONTINUED | OUTPATIENT
Start: 2023-07-31 | End: 2023-08-04 | Stop reason: HOSPADM

## 2023-07-31 RX ORDER — HYDROCODONE BITARTRATE AND ACETAMINOPHEN 5; 325 MG/1; MG/1
1 TABLET ORAL EVERY 4 HOURS PRN
Status: DISCONTINUED | OUTPATIENT
Start: 2023-07-31 | End: 2023-08-04 | Stop reason: HOSPADM

## 2023-07-31 RX ORDER — FENTANYL CITRATE 50 UG/ML
INJECTION, SOLUTION INTRAMUSCULAR; INTRAVENOUS AS NEEDED
Status: DISCONTINUED | OUTPATIENT
Start: 2023-07-31 | End: 2023-07-31 | Stop reason: SURG

## 2023-07-31 RX ORDER — HYDRALAZINE HYDROCHLORIDE 20 MG/ML
5 INJECTION INTRAMUSCULAR; INTRAVENOUS
Status: DISCONTINUED | OUTPATIENT
Start: 2023-07-31 | End: 2023-07-31 | Stop reason: HOSPADM

## 2023-07-31 RX ORDER — ACETAMINOPHEN 325 MG/1
650 TABLET ORAL ONCE AS NEEDED
Status: DISCONTINUED | OUTPATIENT
Start: 2023-07-31 | End: 2023-07-31 | Stop reason: HOSPADM

## 2023-07-31 RX ORDER — ENOXAPARIN SODIUM 100 MG/ML
40 INJECTION SUBCUTANEOUS DAILY
Status: DISCONTINUED | OUTPATIENT
Start: 2023-08-01 | End: 2023-08-02

## 2023-07-31 RX ORDER — HYDROCODONE BITARTRATE AND ACETAMINOPHEN 5; 325 MG/1; MG/1
2 TABLET ORAL ONCE AS NEEDED
Status: COMPLETED | OUTPATIENT
Start: 2023-07-31 | End: 2023-07-31

## 2023-07-31 RX ORDER — SODIUM CHLORIDE, SODIUM LACTATE, POTASSIUM CHLORIDE, CALCIUM CHLORIDE 600; 310; 30; 20 MG/100ML; MG/100ML; MG/100ML; MG/100ML
20 INJECTION, SOLUTION INTRAVENOUS ONCE
Status: COMPLETED | OUTPATIENT
Start: 2023-07-31 | End: 2023-07-31

## 2023-07-31 RX ORDER — METHYLPREDNISOLONE ACETATE 40 MG/ML
INJECTION, SUSPENSION INTRA-ARTICULAR; INTRALESIONAL; INTRAMUSCULAR; SOFT TISSUE AS NEEDED
Status: DISCONTINUED | OUTPATIENT
Start: 2023-07-31 | End: 2023-07-31 | Stop reason: HOSPADM

## 2023-07-31 RX ORDER — SODIUM CHLORIDE 0.9 % (FLUSH) 0.9 %
10 SYRINGE (ML) INJECTION EVERY 12 HOURS SCHEDULED
Status: DISCONTINUED | OUTPATIENT
Start: 2023-07-31 | End: 2023-08-04 | Stop reason: HOSPADM

## 2023-07-31 RX ORDER — ATORVASTATIN CALCIUM 20 MG/1
20 TABLET, FILM COATED ORAL DAILY
Status: DISCONTINUED | OUTPATIENT
Start: 2023-07-31 | End: 2023-08-04 | Stop reason: HOSPADM

## 2023-07-31 RX ORDER — SUCCINYLCHOLINE CHLORIDE 20 MG/ML
INJECTION INTRAMUSCULAR; INTRAVENOUS AS NEEDED
Status: DISCONTINUED | OUTPATIENT
Start: 2023-07-31 | End: 2023-07-31 | Stop reason: SURG

## 2023-07-31 RX ADMIN — MIDAZOLAM 2 MG: 1 INJECTION INTRAMUSCULAR; INTRAVENOUS at 07:32

## 2023-07-31 RX ADMIN — HYDROMORPHONE HYDROCHLORIDE 0.5 MG: 1 INJECTION, SOLUTION INTRAMUSCULAR; INTRAVENOUS; SUBCUTANEOUS at 23:38

## 2023-07-31 RX ADMIN — EPHEDRINE SULFATE 20 MG: 5 INJECTION INTRAVENOUS at 07:56

## 2023-07-31 RX ADMIN — Medication 25 MG: at 07:37

## 2023-07-31 RX ADMIN — Medication 25 MG: at 07:46

## 2023-07-31 RX ADMIN — PROPOFOL 50 MG: 10 INJECTION, EMULSION INTRAVENOUS at 08:28

## 2023-07-31 RX ADMIN — HYDROMORPHONE HYDROCHLORIDE 0.5 MG: 1 INJECTION, SOLUTION INTRAMUSCULAR; INTRAVENOUS; SUBCUTANEOUS at 21:17

## 2023-07-31 RX ADMIN — REMIFENTANIL HYDROCHLORIDE 0.08 MCG/KG/MIN: 1 INJECTION, POWDER, LYOPHILIZED, FOR SOLUTION INTRAVENOUS at 07:38

## 2023-07-31 RX ADMIN — LIDOCAINE HYDROCHLORIDE 0.5 ML: 10 INJECTION, SOLUTION INFILTRATION; PERINEURAL at 06:51

## 2023-07-31 RX ADMIN — Medication 200 MCG: at 11:08

## 2023-07-31 RX ADMIN — CEFAZOLIN 2 G: 2 INJECTION, POWDER, FOR SOLUTION INTRAMUSCULAR; INTRAVENOUS at 07:40

## 2023-07-31 RX ADMIN — Medication 50 MCG: at 08:38

## 2023-07-31 RX ADMIN — HYDRALAZINE HYDROCHLORIDE 50 MG: 25 TABLET, FILM COATED ORAL at 21:23

## 2023-07-31 RX ADMIN — HYDROMORPHONE HYDROCHLORIDE 0.5 MG: 1 INJECTION, SOLUTION INTRAMUSCULAR; INTRAVENOUS; SUBCUTANEOUS at 13:46

## 2023-07-31 RX ADMIN — PROPOFOL INJECTABLE EMULSION 125 MCG/KG/MIN: 10 INJECTION, EMULSION INTRAVENOUS at 07:37

## 2023-07-31 RX ADMIN — EPHEDRINE SULFATE 20 MG: 5 INJECTION INTRAVENOUS at 07:50

## 2023-07-31 RX ADMIN — ACETAMINOPHEN 1000 MG: 500 TABLET, FILM COATED ORAL at 07:14

## 2023-07-31 RX ADMIN — FENTANYL CITRATE 100 MCG: 50 INJECTION, SOLUTION INTRAMUSCULAR; INTRAVENOUS at 07:32

## 2023-07-31 RX ADMIN — PROPOFOL 200 MG: 10 INJECTION, EMULSION INTRAVENOUS at 07:37

## 2023-07-31 RX ADMIN — CEFAZOLIN 2 G: 2 INJECTION, POWDER, FOR SOLUTION INTRAMUSCULAR; INTRAVENOUS at 11:40

## 2023-07-31 RX ADMIN — OXYCODONE HYDROCHLORIDE 5 MG: 5 TABLET ORAL at 07:14

## 2023-07-31 RX ADMIN — SODIUM CHLORIDE, POTASSIUM CHLORIDE, SODIUM LACTATE AND CALCIUM CHLORIDE 20 ML/HR: 600; 310; 30; 20 INJECTION, SOLUTION INTRAVENOUS at 06:51

## 2023-07-31 RX ADMIN — PROPOFOL 50 MG: 10 INJECTION, EMULSION INTRAVENOUS at 08:18

## 2023-07-31 RX ADMIN — LIDOCAINE HYDROCHLORIDE 50 MG: 10 INJECTION, SOLUTION EPIDURAL; INFILTRATION; INTRACAUDAL; PERINEURAL at 07:37

## 2023-07-31 RX ADMIN — SODIUM CHLORIDE, SODIUM LACTATE, POTASSIUM CHLORIDE, AND CALCIUM CHLORIDE: .6; .31; .03; .02 INJECTION, SOLUTION INTRAVENOUS at 07:25

## 2023-07-31 RX ADMIN — Medication 2 MCG: at 11:40

## 2023-07-31 RX ADMIN — HYDROCODONE BITARTRATE AND ACETAMINOPHEN 2 TABLET: 5; 325 TABLET ORAL at 13:52

## 2023-07-31 RX ADMIN — SUCCINYLCHOLINE CHLORIDE 100 MG: 20 INJECTION, SOLUTION INTRAMUSCULAR; INTRAVENOUS at 07:37

## 2023-07-31 NOTE — SIGNIFICANT NOTE
07/31/23 1524   OTHER   Discipline physical therapist   Rehab Time/Intention   Session Not Performed patient unavailable for evaluation  (in sx; will eval in AM)   Recommendation   PT - Next Appointment 08/01/23

## 2023-07-31 NOTE — H&P
Previous treatment:     HPI: This is a 73-year-old woman with a BMI of 33 as well as significant lung disease who has had a prior thyroidectomy and was a chronic everyday smoker at 2 packs/day for greater than 40 years.  She comments that approximately 20 years ago she was offered neck surgery for which she turned down.  She has had chronic conservative therapy including physical therapy multiple times.  She has had greater than 5 years of progressive numbness in her hands and feet.  She has progressive worsening of her dexterity as well as difficulty signing her name, buttoning a button as well as picking up small items off of a table.  She drops items regularly.  She has significant balance issues as well as proprioceptive issues that are worsened on uneven ground as well as in the dark.  I last evaluated her on April 6, 2023.  When I initially evaluated her in February she had a possible C5 radiculopathy but that distribution the pain had significantly improved by the time I saw her in April.  MI April appointment I recommended surgical decompression but she would need significant clearance including pulmonary clearance.  She has seen her pulmonologist and undergone pulmonary function testing and has been cleared.  She comments that her myelopathic symptoms continue to slowly progress.     Medical History        Past Medical History:   Diagnosis Date    Fibrocystic breast      Hyperlipidemia      Hypertension              Surgical History         Past Surgical History:   Procedure Laterality Date    APPENDECTOMY   1958    AUGMENTATION MAMMAPLASTY        BREAST CYST ASPIRATION        BREAST CYST EXCISION        HYSTERECTOMY        TONSILLECTOMY   1958                   Current Outpatient Medications on File Prior to Visit   Medication Sig Dispense Refill    ALBUTEROL SULFATE  (90 Base) MCG/ACT inhaler INHALE 2 PUFFS BY MOUTH EVERY 4 HOURS AS NEEDED FOR WHEEZING 42.5 g 2    allopurinol (ZYLOPRIM) 300 MG  tablet TAKE 1 TABLET BY MOUTH  DAILY 90 tablet 3    atorvastatin (LIPITOR) 20 MG tablet TAKE 1 TABLET BY MOUTH  DAILY 90 tablet 3    Cholecalciferol 1000 units tablet VITAMIN D TABS        citalopram (CeleXA) 20 MG tablet TAKE 1 TABLET BY MOUTH  DAILY 90 tablet 3    fluorometholone (FML) 0.1 % ophthalmic suspension          l-methylfolate-algae-B12-B6 3-90.314-2-35 MG capsule capsule Take 1 capsule by mouth 2 (Two) Times a Day. 180 capsule 3    levothyroxine (SYNTHROID, LEVOTHROID) 25 MCG tablet TAKE 1 TABLET BY MOUTH  DAILY 90 tablet 3    metoprolol succinate XL (TOPROL-XL) 100 MG 24 hr tablet TAKE 1 TABLET BY MOUTH  DAILY 90 tablet 3    montelukast (SINGULAIR) 10 MG tablet TAKE 1 TABLET BY MOUTH  DAILY 90 tablet 3    ofloxacin (OCUFLOX) 0.3 % ophthalmic solution          omeprazole (priLOSEC) 40 MG capsule TAKE 1 CAPSULE BY MOUTH  DAILY 90 capsule 3    potassium chloride (K-DUR,KLOR-CON) 10 MEQ CR tablet Daily As Needed. Pt only takes when having edema and takes diuretic        Prolensa 0.07 % solution          Restasis 0.05 % ophthalmic emulsion          Symbicort 80-4.5 MCG/ACT inhaler INHALE 2 INHALATIONS BY  MOUTH TWICE DAILY 10.2 g 11    triamcinolone (KENALOG) 0.1 % ointment APPLY TOPICALLY TO LOWER LEG EVERY DAY        triamcinolone (KENALOG) 0.5 % ointment APPLY TOPICALLY TO THE  APPROPRIATE AREA AS  DIRECTED TWICE DAILY 90 g 1    valsartan-hydrochlorothiazide (DIOVAN-HCT) 320-25 MG per tablet TAKE 1 TABLET BY MOUTH  DAILY (Patient taking differently: Daily As Needed. Pt only taking when bp is high) 90 tablet 3    vitamin E 100 UNIT capsule VITAMIN E CAPS        furosemide (LASIX) 40 MG tablet Take 1 tablet by mouth Daily for 90 days. (Patient taking differently: Take 1 tablet by mouth Daily As Needed. Pt only takes when having edema) 90 tablet 2    hydrALAZINE (APRESOLINE) 50 MG tablet Take 1 tablet by mouth 3 (Three) Times a Day for 30 days. 90 tablet 11      No current facility-administered medications  "on file prior to visit.              Allergies   Allergen Reactions    Nsaids GI Bleeding         Social History   Social History            Socioeconomic History    Marital status:    Tobacco Use    Smoking status: Former       Packs/day: 1.50       Years: 40.00       Pack years: 60.00       Types: Cigarettes       Start date: 1965       Quit date: 2006       Years since quittin.5    Smokeless tobacco: Never   Vaping Use    Vaping Use: Never used   Substance and Sexual Activity    Alcohol use: Yes       Alcohol/week: 11.0 standard drinks       Types: 4 Glasses of wine, 7 Shots of liquor per week       Comment: daily, vodka tonic    Drug use: No    Sexual activity: Not Currently       Partners: Male       Birth control/protection: Post-menopausal               Review of Systems   Constitutional:  Positive for activity change.   HENT: Negative.     Eyes: Negative.    Respiratory: Negative.     Cardiovascular: Negative.    Gastrointestinal: Negative.    Endocrine: Negative.    Genitourinary: Negative.    Musculoskeletal:  Positive for arthralgias.   Skin: Negative.    Allergic/Immunologic: Negative.    Neurological:  Positive for numbness (tingling/feet).   Hematological: Negative.    Psychiatric/Behavioral: Negative.         Physical Examination:                Vitals       Vitals:     23 1207   BP: 142/79   Pulse: 78   Weight: 82 kg (180 lb 12.8 oz)   Height: 157.5 cm (62\")            Physical Exam      Neurological Exam   Neurological examination appears stable compared to my previous evaluations.  She has a clear visual deformity in her neck as well as some lower extremity weakness and ambulating.     Result Review  The following data was reviewed by: Wilber Mcgowan MD on 2023:     Data reviewed : Radiologic studies MRI of the cervical spine show significant reversal of lordosis.  There is C4 on C5 spondylolisthesis with significant central canal stenosis and associated posterior " ligamentum flavum buckling.  There is significant anterior osteophytic change in the lower cervical spine.  Scoliosis x-rays do not show any significant sagittal imbalance however it does confirm the presence of cervical deformity.  The flexion-extension x-rays do not suggest any dynamic spondylolisthesis but does confirm the presence of relatively significant anterior listhesis on this level C4-C5.          Assessment/plan:  This is a 73-year-old woman with significant lung disease as well as a history of a prior thyroidectomy who has progressive cervical myelopathy.  This is life altering.  She has reversal of her cervical lordosis with severe nondynamic C4-C5 spondylolisthesis.  Physical therapy has helped with neck pain as well as her shoulder pain however the compression of her spinal cord is significant and causing her progressive cervical myelopathy.  She has been cleared for surgical intervention.  I recommend a C5 corpectomy with C4-C6 anterior fusion.  She will also require a CT 4 to C6 posterior instrumentation with potential wendy osteotomies.  We will likely stage this procedure.  We will work to have her scheduled appropriately after confirming that all necessary preoperative evaluation has occurred.  I have encouraged her to call with any questions or concerns.

## 2023-08-01 ENCOUNTER — ANESTHESIA EVENT (OUTPATIENT)
Dept: PERIOP | Facility: HOSPITAL | Age: 73
DRG: 455 | End: 2023-08-01
Payer: MEDICARE

## 2023-08-01 LAB
ANION GAP SERPL CALCULATED.3IONS-SCNC: 10 MMOL/L (ref 5–15)
BASOPHILS # BLD AUTO: 0 10*3/MM3 (ref 0–0.2)
BASOPHILS NFR BLD AUTO: 0.4 % (ref 0–1.5)
BUN SERPL-MCNC: 9 MG/DL (ref 8–23)
BUN/CREAT SERPL: 16.7 (ref 7–25)
CALCIUM SPEC-SCNC: 8.8 MG/DL (ref 8.6–10.5)
CHLORIDE SERPL-SCNC: 97 MMOL/L (ref 98–107)
CO2 SERPL-SCNC: 30 MMOL/L (ref 22–29)
CREAT SERPL-MCNC: 0.54 MG/DL (ref 0.57–1)
DEPRECATED RDW RBC AUTO: 49.9 FL (ref 37–54)
EGFRCR SERPLBLD CKD-EPI 2021: 96.1 ML/MIN/1.73
EOSINOPHIL # BLD AUTO: 0 10*3/MM3 (ref 0–0.4)
EOSINOPHIL NFR BLD AUTO: 0 % (ref 0.3–6.2)
ERYTHROCYTE [DISTWIDTH] IN BLOOD BY AUTOMATED COUNT: 13.3 % (ref 12.3–15.4)
GLUCOSE SERPL-MCNC: 165 MG/DL (ref 65–99)
HCT VFR BLD AUTO: 35.2 % (ref 34–46.6)
HGB BLD-MCNC: 11.5 G/DL (ref 12–15.9)
LYMPHOCYTES # BLD AUTO: 0.4 10*3/MM3 (ref 0.7–3.1)
LYMPHOCYTES NFR BLD AUTO: 5 % (ref 19.6–45.3)
MCH RBC QN AUTO: 33.6 PG (ref 26.6–33)
MCHC RBC AUTO-ENTMCNC: 32.8 G/DL (ref 31.5–35.7)
MCV RBC AUTO: 102.5 FL (ref 79–97)
MONOCYTES # BLD AUTO: 0.7 10*3/MM3 (ref 0.1–0.9)
MONOCYTES NFR BLD AUTO: 8.6 % (ref 5–12)
NEUTROPHILS NFR BLD AUTO: 6.5 10*3/MM3 (ref 1.7–7)
NEUTROPHILS NFR BLD AUTO: 86 % (ref 42.7–76)
NRBC BLD AUTO-RTO: 0 /100 WBC (ref 0–0.2)
PLATELET # BLD AUTO: 247 10*3/MM3 (ref 140–450)
PMV BLD AUTO: 7.3 FL (ref 6–12)
POTASSIUM SERPL-SCNC: 4.5 MMOL/L (ref 3.5–5.2)
RBC # BLD AUTO: 3.44 10*6/MM3 (ref 3.77–5.28)
SODIUM SERPL-SCNC: 137 MMOL/L (ref 136–145)
WBC NRBC COR # BLD: 7.6 10*3/MM3 (ref 3.4–10.8)

## 2023-08-01 PROCEDURE — 94640 AIRWAY INHALATION TREATMENT: CPT

## 2023-08-01 PROCEDURE — 97166 OT EVAL MOD COMPLEX 45 MIN: CPT

## 2023-08-01 PROCEDURE — 25010000002 HYDROMORPHONE 1 MG/ML SOLUTION

## 2023-08-01 PROCEDURE — 80048 BASIC METABOLIC PNL TOTAL CA: CPT

## 2023-08-01 PROCEDURE — 99024 POSTOP FOLLOW-UP VISIT: CPT | Performed by: NURSE PRACTITIONER

## 2023-08-01 PROCEDURE — 85025 COMPLETE CBC W/AUTO DIFF WBC: CPT

## 2023-08-01 PROCEDURE — 25010000002 ENOXAPARIN PER 10 MG

## 2023-08-01 PROCEDURE — 94761 N-INVAS EAR/PLS OXIMETRY MLT: CPT

## 2023-08-01 PROCEDURE — 94799 UNLISTED PULMONARY SVC/PX: CPT

## 2023-08-01 RX ORDER — HYDROXYZINE HYDROCHLORIDE 25 MG/1
25 TABLET, FILM COATED ORAL 3 TIMES DAILY PRN
Status: DISCONTINUED | OUTPATIENT
Start: 2023-08-01 | End: 2023-08-04 | Stop reason: HOSPADM

## 2023-08-01 RX ORDER — FUROSEMIDE 40 MG/1
40 TABLET ORAL DAILY
COMMUNITY
End: 2023-08-16

## 2023-08-01 RX ORDER — OMEPRAZOLE 40 MG/1
40 CAPSULE, DELAYED RELEASE ORAL DAILY PRN
COMMUNITY
End: 2023-08-16

## 2023-08-01 RX ADMIN — PANTOPRAZOLE SODIUM 40 MG: 40 TABLET, DELAYED RELEASE ORAL at 05:39

## 2023-08-01 RX ADMIN — HYDROCODONE BITARTRATE AND ACETAMINOPHEN 1 TABLET: 5; 325 TABLET ORAL at 13:06

## 2023-08-01 RX ADMIN — HYDROMORPHONE HYDROCHLORIDE 0.5 MG: 1 INJECTION, SOLUTION INTRAMUSCULAR; INTRAVENOUS; SUBCUTANEOUS at 04:08

## 2023-08-01 RX ADMIN — BUDESONIDE AND FORMOTEROL FUMARATE DIHYDRATE 1 PUFF: 160; 4.5 AEROSOL RESPIRATORY (INHALATION) at 18:37

## 2023-08-01 RX ADMIN — LEVOTHYROXINE SODIUM 25 MCG: 0.03 TABLET ORAL at 08:24

## 2023-08-01 RX ADMIN — HYDRALAZINE HYDROCHLORIDE 50 MG: 25 TABLET, FILM COATED ORAL at 20:09

## 2023-08-01 RX ADMIN — Medication 10 ML: at 21:54

## 2023-08-01 RX ADMIN — ENOXAPARIN SODIUM 40 MG: 100 INJECTION SUBCUTANEOUS at 17:02

## 2023-08-01 RX ADMIN — METOPROLOL SUCCINATE 100 MG: 50 TABLET, EXTENDED RELEASE ORAL at 08:24

## 2023-08-01 RX ADMIN — HYDROXYZINE HYDROCHLORIDE 25 MG: 25 TABLET, FILM COATED ORAL at 17:02

## 2023-08-01 RX ADMIN — HYDROCODONE BITARTRATE AND ACETAMINOPHEN 1 TABLET: 5; 325 TABLET ORAL at 05:39

## 2023-08-01 RX ADMIN — BUDESONIDE AND FORMOTEROL FUMARATE DIHYDRATE 1 PUFF: 160; 4.5 AEROSOL RESPIRATORY (INHALATION) at 07:32

## 2023-08-01 RX ADMIN — HYDRALAZINE HYDROCHLORIDE 50 MG: 25 TABLET, FILM COATED ORAL at 08:25

## 2023-08-01 RX ADMIN — HYDROCODONE BITARTRATE AND ACETAMINOPHEN 1 TABLET: 5; 325 TABLET ORAL at 20:09

## 2023-08-01 RX ADMIN — ATORVASTATIN CALCIUM 20 MG: 20 TABLET, FILM COATED ORAL at 08:25

## 2023-08-01 RX ADMIN — HYDRALAZINE HYDROCHLORIDE 50 MG: 25 TABLET, FILM COATED ORAL at 17:02

## 2023-08-01 RX ADMIN — HYDROCHLOROTHIAZIDE 25 MG: 25 TABLET ORAL at 08:25

## 2023-08-01 RX ADMIN — CITALOPRAM HYDROBROMIDE 20 MG: 20 TABLET ORAL at 08:25

## 2023-08-01 RX ADMIN — Medication 10 ML: at 08:26

## 2023-08-01 RX ADMIN — FAMOTIDINE 20 MG: 20 TABLET, FILM COATED ORAL at 08:25

## 2023-08-01 RX ADMIN — MONTELUKAST 10 MG: 10 TABLET, FILM COATED ORAL at 08:25

## 2023-08-01 RX ADMIN — FAMOTIDINE 20 MG: 20 TABLET, FILM COATED ORAL at 20:09

## 2023-08-01 NOTE — PROGRESS NOTES
Neurosurgery Progress Note      Patient: Lori Sutton    YOB: 1950    Medical Record Number: [id number]    Attending Physician: Ramy Ruiz MD    Date of Admission: 7/31/2023  5:22 AM    Status Post: DAY 1 -CERVICAL CORPECTOMY  LEVEL CERVICAL 5 WITH FUSION LEVELS CERVICAL 4-6 ANTERIOR    Post Operative Day Number: 1    Admitting Dx: Cervical spondylosis with myelopathy [M47.12]    General Appearance:  73 y.o. female awake and alert sitting up in chair eating breakfast.  She is moving all extremities.  C-collar in place.  She reports good pain control postoperatively.  She has no acute neurologic complaints.  She reports no swallowing difficulties.    Current Problem List:     Cervical spondylosis with myelopathy          Current Medications:  Scheduled Meds:atorvastatin, 20 mg, Oral, Daily  budesonide-formoterol, 1 puff, Inhalation, BID - RT  citalopram, 20 mg, Oral, Daily  enoxaparin, 40 mg, Subcutaneous, Daily  famotidine, 20 mg, Oral, BID  hydrALAZINE, 50 mg, Oral, TID  valsartan, 320 mg, Oral, Q24H   And  hydroCHLOROthiazide, 25 mg, Oral, Q24H  levothyroxine, 25 mcg, Oral, Daily  metoprolol succinate XL, 100 mg, Oral, Daily  montelukast, 10 mg, Oral, Daily  pantoprazole, 40 mg, Oral, Q AM  sodium chloride, 10 mL, Intravenous, Q12H      Continuous Infusions:   PRN Meds:.  acetaminophen    albuterol sulfate HFA    bisacodyl    docusate sodium    HYDROcodone-acetaminophen    HYDROmorphone **AND** naloxone    ipratropium-albuterol    magnesium hydroxide    melatonin    methocarbamol    ondansetron    sodium chloride    sodium chloride      Diagnostic Tests:    Lab Results (last 24 hours)       Procedure Component Value Units Date/Time    Basic Metabolic Panel [[id number]]  (Abnormal) Collected: 08/01/23 0555    Specimen: Blood Updated: 08/01/23 0652     Glucose 165 mg/dL      BUN 9 mg/dL      Creatinine 0.54 mg/dL      Sodium 137 mmol/L      Potassium 4.5 mmol/L      Chloride 97 mmol/L       CO2 30.0 mmol/L      Calcium 8.8 mg/dL      BUN/Creatinine Ratio 16.7     Anion Gap 10.0 mmol/L      eGFR 96.1 mL/min/1.73     Narrative:      GFR Normal >60  Chronic Kidney Disease <60  Kidney Failure <15    The GFR formula is only valid for adults with stable renal function between ages 18 and 70.    CBC & Differential [[id number]]  (Abnormal) Collected: 08/01/23 0555    Specimen: Blood Updated: 08/01/23 0624    Narrative:      The following orders were created for panel order CBC & Differential.  Procedure                               Abnormality         Status                     ---------                               -----------         ------                     CBC Auto Differential[[id number]]        Abnormal            Final result                 Please view results for these tests on the individual orders.    CBC Auto Differential [[id number]]  (Abnormal) Collected: 08/01/23 0555    Specimen: Blood Updated: 08/01/23 0624     WBC 7.60 10*3/mm3      RBC 3.44 10*6/mm3      Hemoglobin 11.5 g/dL      Hematocrit 35.2 %      .5 fL      MCH 33.6 pg      MCHC 32.8 g/dL      RDW 13.3 %      RDW-SD 49.9 fl      MPV 7.3 fL      Platelets 247 10*3/mm3      Neutrophil % 86.0 %      Lymphocyte % 5.0 %      Monocyte % 8.6 %      Eosinophil % 0.0 %      Basophil % 0.4 %      Neutrophils, Absolute 6.50 10*3/mm3      Lymphocytes, Absolute 0.40 10*3/mm3      Monocytes, Absolute 0.70 10*3/mm3      Eosinophils, Absolute 0.00 10*3/mm3      Basophils, Absolute 0.00 10*3/mm3      nRBC 0.0 /100 WBC             Imaging Results (Last 24 Hours)       Procedure Component Value Units Date/Time    CT Cervical Spine Without Contrast [[id number]] Collected: 07/31/23 2142     Updated: 07/31/23 2208    Narrative:        CT CERVICAL SPINE WO CONTRAST    Date of Exam: 7/31/2023 9:00 PM EDT    Indication: s/p cervical fusion.    Comparison: MRI cervical spine 2/15/2023    Technique: Axial CT images were obtained of the cervical spine  without contrast administration.  Sagittal and coronal reconstructions were performed.  Automated exposure control and iterative reconstruction methods were used.    Findings:  There are postoperative changes of cervical ACDF at C4-C6 with corpectomy at C5. Multilevel disc narrowing and facet arthritis in the cervical spine with disc narrowing at C6-7, C7-T1. There is ankylosis of the facet joints bilaterally at C3-4. The dens   is intact. Lateral masses of C1 are normally aligned on C2. The occipital condyles are intact. The left C4 fixation screw extends into the left aspect of the disc space (sagittal image 33). Reversal of the cervical lordosis centered at the C6 level.    Visualized portions of the posterior fossa are without acute abnormality. Streak artifact from dental amalgam limits oropharyngeal assessment. Carotid calcifications asymmetric to the left with retropharyngeal course of the left ICA (4/57). Multiple foci   of subcutaneous air within the anterior and right paracentral head neck related to surgery. No organized fluid collection. Lung apices demonstrate emphysema. No apical pneumothorax.    C2-3: There is bilateral facet arthritis contributing to moderate left foraminal stenosis. No right foraminal stenosis or canal stenosis.    C3-4: There is asymmetric left-sided uncovertebral spurring and facet arthritis resulting in severe left neural foraminal stenosis. Mild right foraminal stenosis. Negative for canal stenosis.    C4-5: Status post ACDF. Asymmetric severe left-sided facet arthritis and uncovertebral spurring resulting in severe left neural foraminal narrowing. Right-sided facet arthritis results in mild right neural foraminal narrowing. Negative for canal stenosis   within limits of streak artifact.    C5-6: C5 corpectomy. Bilateral uncovertebral spurring resulting in severe right foraminal stenosis. Moderate left foraminal stenosis. Negative for canal stenosis.    C6-7: Bilateral vertebral  spurring resulting in severe bilateral foraminal stenosis. Mild central canal stenosis.    C7-T1: Posterior disc osteophyte complex. Bilateral uncovertebral spurring resulting in moderate bilateral foraminal stenosis.      Impression:      Impression:  1. Postoperative changes related to C4-C6 ACDF with corpectomy at C5.  2. Multilevel advanced cervical degenerative findings above.  3. Carotid atherosclerotic disease  asymmetric to the left with retropharyngeal course of the left ICA.      Electronically Signed: Quincy Mouser    7/31/2023 10:06 PM EDT    Workstation ID: [id number]    FL C Arm During Surgery [[id number]] Resulted: 07/31/23 1411     Updated: 07/31/23 1411    Narrative:      This procedure was auto-finalized with no dictation required.    XR Spine Cervical 2 or 3 View [[id number]] Resulted: 07/31/23 1409     Updated: 07/31/23 1410            Physical Exam:   General Appearance:  Alert, cooperative, in no acute distress   Head:  Normocephalic, without obvious abnormality, atraumatic   Neck:   C-collar in place, cervical incision well approximated with no drainage or obvious swelling seen or palpated.  Trachea is midline.   Abdomen:   nontender, nondistended   Extremities/MSK: Moves all extremities well, no edema, no cyanosis, no             Redness    Skin: No bleeding, bruising or rash   Neurologic: Cranial nerves 2 - 12 grossly intact, sensation intact, DTR       present and equal bilaterally          Vitals:    08/01/23 0454 08/01/23 0732 08/01/23 0735 08/01/23 0752   BP: 127/76   148/98   BP Location: Right arm   Left arm   Patient Position: Lying   Lying   Pulse: 81 78 86 85   Resp: 15 14 14 15   Temp: 97.5 øF (36.4 øC)   97.4 øF (36.3 øC)   TempSrc: Oral   Oral   SpO2: 95% 94% 94% 98%   Weight:       Height:             Assessment: Patient is a 73-year-old female that is s/p C4-C6 ACDF with C5 corpectomy with Dr. Mcgowan.  She is postop day 1.  She is moving all extremities.  Her pain is well controlled.   Her incision looks appropriate.  She is eating with no swallowing difficulties.  She will undergo her stage II procedure tomorrow for posterior fixation.  She will be n.p.o. at midnight.  She should continue to wear her c-collar at all times.  I will call the bracing rep for an extra collar to have available for when patient showers.    Plan:    N.p.o. at midnight  Continue Pain management efforts  Continue mobilization efforts  Continue incisional care  Continue DVT Prophylaxis    Discharge Plan: Likely rehab    I discussed findings with patient, nursing staff and Dr. Mcgowan.    Date: 8/1/2023    Valery Patrick, APRN  09:02 EDT

## 2023-08-01 NOTE — OP NOTE
Neurosurgical operative report:    Patient name: Lori Sutton  Medical record number: [id number]  Date of procedure: July 31, 2023    Preoperative diagnosis: C4-C5 spondylolisthesis resulting in severe spinal canal stenosis and severe progressive cervical myelopathy    Postoperative diagnosis: C4-C5 spondylolisthesis resulting in severe spinal canal stenosis and severe progressive cervical myelopathy    Procedure performed: C5 anterior cervical corpectomy, C4-C6 anterior cervical fixation, utilization of structural allograft, utilization of biological allograft, utilization of biological autograft, utilization of intraoperative neuro monitoring, utilization of intraoperative microscope    Surgeon: Dr. Wilber Mcgowan  Assistant: Jess Myers  Anesthesia: General endotracheal anesthetic  Specimens: None  Blood loss: 150 cc  Drains: None  Complications: None immediate    Indications: This is a 73-year-old woman with a BMI of 33 as well as significant lung disease who has had a prior thyroidectomy and was a chronic everyday smoker at 2 packs/day for the greater than 40 years.  She comments that approximately 20 years ago she was offered neck surgery for which she turned down.  She has had chronic conservative therapy including physical therapy multiple times.  She has had greater than 5 years of progressive numbness in her hands and feet.  She has progressive worsening of her dexterity as well as difficulty signing her name, buttoning a button as well as picking up small items off the table.  She drops items on a regular basis.  She has significant balance issues as well as proprioceptive issues.  When I initially evaluated her she did have complaints of shoulder pain that could resemble a C5 radiculopathy however this resolved with time.  She was found to have severe spondylolisthesis and spinal cord compression causing cervical myelopathy.  I explained to her the pathology as well as the natural history of this  disease.  I explained to her the treatment options including possible surgical option and after explanation of the risks and benefits she elected to proceed.    Description of procedure: The patient was identified in the preoperative holding area via name and medical record number.  Her history, physical exam, consent were all reviewed and found to be correct and appropriate for proceeding with surgery.  She was marked over the intended surgical site.  She was brought back to the operating room and handed over to anesthesia for induction of general endotracheal anesthetic.  She was transferred from the hospital bed to the operating room table in the supine position.  Baseline motor and sensory neuro monitoring status was established.  Shoulder bump was placed between her shoulder blades.  Post positioning status was neurologically stable on neuro monitoring.  Incision was marked out based on anatomic and radiographic guidance.  Local anesthetic was instilled.  At this time the surgeon scrubbed in the area was prepped and draped in the usual sterile fashion.  Timeout was performed and all categories were found to be correct and appropriate for proceeding with surgery.  Incision was made with 15 blade and carried through subcutaneous tissue.  Supraplatysmal dissection was accomplished with Metzenbaum scissors.  Self-retaining retractor was brought into provide adequate visualization.  Bipolar Bovie electrocautery was utilized to coagulate the platysma in a longitudinal fashion.  Metzenbaum scissors was utilized to open up the platysma.  Subplatysmal dissection was accomplished with Metzenbaum scissors and a blunt dissection mechanism.  Digital dissection was utilized to identify a plane between the trachea and esophagus to the midline as well as the carotid laterally.  This was carried down to the vertebral body.  Hand-held retractors were brought into provide adequate visualization.  Kitners were utilized to clear  off the prevertebral fascia.  A clear step-off was identified at C4-C5.  Radiographic imaging confirmed proper levels.  Bovie electrocautery was utilized to clear off the prevertebral fascia and take down the longus coli muscles bilaterally.  Self-retaining retractor was brought into provide adequate visualization.  Microscope was brought on the field.  The C4 anterior osteophyte was resected and the disc base was identified.  Discectomy was performed via multiple grasping instruments and curettes.  Attempts at identifying the C5-6 disc space was obscured by osteophytic bone growth.  C5 corpectomy was accomplished with power drill bit.  This was carried down to the posterior longitudinal ligament.  Kerrison bone punch was utilized to remove the eggshell thin residual bone.  The posterior longitudinal ligament was opened up across the extension of the C5 corpectomy.  Care was taken to undermine the bony stenosis at C4-C5.  The wound was copiously irrigated and hemostasis was obtained.  Attempts at placement of an expandable cage was abandoned secondary to malfunctioning of the expandable cage.  A stackable peek cage was deemed to be the best option at that juncture.  Calipers were utilized to measure the height after distracting across the C4 and C6 vertebral bodies.  22 mm of height was deemed to be appropriate in size.  The 14 x 11 mm footplate was deemed to be appropriate in size.  The stackable cage was built and packed with biological allograft and autograft.  This was placed into the intervertebral space under radiographic guidance.  The distraction was removed and the distraction pins were removed.  Hemostasis was obtained with bone wax.  A 28 mm plate was deemed to be appropriate in size and placed across the disc space.  12 mm screws were placed into the C4 vertebral body as well as C6 vertebral body to hold things in place.  These were secured in place with the torquing mechanism.  Radiographic imaging  confirmed adequate placement.  The wound was copiously irrigated and hemostasis was obtained.  Self-retaining retractors were removed.  Vancomycin powder and Depo-Medrol were placed into the wound.  The platysma was closed with interrupted 3-0 Vicryl stitches.  The subcutaneous tissue was closed with interrupted 3-0 Vicryl stitches.  The skin was brought together by a running V-Loc Monocryl and skin glue.  This was covered with a dressing.  The drapes were taken down and the patient was placed into a hard cervical collar.  She was extubated intraoperatively.  She was transferred back to the hospital bed in the supine position she was transported to the postanesthesia care unit.  At the time of her being dropped off at that location no complications were evident.  At the end of the case all counts were found to be correct.  I was present and scrubbed for all key portions of the procedure and immediately available at all times.  The assistance of the surgical first assist was essential for successful completion of surgical intervention including assistance with opening, closing, suction, retraction.  End of dictation.  Thank you very much.

## 2023-08-01 NOTE — SIGNIFICANT NOTE
Scheduled for part 2 surgery on 8/2; patient has been getting up with assistance from staff without difficulties

## 2023-08-01 NOTE — CASE MANAGEMENT/SOCIAL WORK
Social Work Assessment   [place]     Patient Name: Lori Sutton  MRN: [id number]  Today's Date: 8/1/2023    Admit Date: 7/31/2023     Discharge Plan       Row Name 08/01/23 1418       Plan    Plan Referral to [facility], pending acceptance. No precert required. PASRR approved.             SANTI Melton, W  Medical Social Worker  Ph [phone]  Fax [phone]  [email or web address]

## 2023-08-01 NOTE — PLAN OF CARE
Goal Outcome Evaluation:  Plan of Care Reviewed With: patient        Progress: no change  Outcome Evaluation: Pt is a 73-year-old with a BMI of 33 as well as significant lung disease who has had a prior thyroidectomy and was a chronic everyday smoker at 2 packs/day for greater than 40 years.  She has had chronic conservative therapy including physical therapy multiple times.  She has had greater than 5 years of progressive numbness in her hands and feet.  She has progressive worsening of her dexterity as well as difficulty signing her name, buttoning a button, and picking up small items off of a table.  She drops items regularly.  She has significant balance issues as well as proprioceptive issues that are worsened on uneven ground as well as in the dark. Pt lives alone and is (I) w/o DME. Pt is POD 1 2-part C-spine surgery for deconpression of her cervical myelopathy. Part 1 was anterior approach and part 2 will be posterior approach. She is wearing a hard Aspen C-collar. She needs RW or HHA to mobilize and is eating with her hands because of difficulty using utensils. Her LB ADL skill is decreased and she has increased pain & SOA. Note wheezing with minimal activity and use of O2 via NC which is not her baseline despite prior lung disease. Current D/C recommendation is ST  rehab. Will FU after her second surgery for OT assessment and update D/C and equipment recommendations at that time.      Anticipated Discharge Disposition (OT): inpatient rehabilitation facility

## 2023-08-01 NOTE — PROGRESS NOTES
[facility] Medicine Services Daily Progress Note    Patient Name: Lori Sutton  : 1950  MRN: [id number]  Primary Care Physician:  Ayaz Luna MD  Date of admission: 2023      Subjective      Chief Complaint: Neck pain     73-year-old woman with a BMI of 33 as well as significant lung disease who has had a prior thyroidectomy and was a chronic everyday smoker at 2 packs/day for greater than 40 years.  She comments that approximately 20 years ago she was offered neck surgery for which she turned down.  She has had chronic conservative therapy including physical therapy multiple times.  She has had greater than 5 years of progressive numbness in her hands and feet.  She has progressive worsening of her dexterity as well as difficulty signing her name, buttoning a button as well as picking up small items off of a table.  She drops items regularly.  She has significant balance issues as well as proprioceptive issues that are worsened on uneven ground as well as in the dark.  I last evaluated her on 2023.  When I initially evaluated her in February she had a possible C5 radiculopathy but that distribution the pain had significantly improved by the time I saw her in April.  MI April appointment I recommended surgical decompression but she would need significant clearance including pulmonary clearance.  She has seen her pulmonologist and undergone pulmonary function testing and has been cleared.  She comments that her myelopathic symptoms continue to slowly progress.       2023 patient seen and examined in bed no acute distress, vital signs stable, she is complaining of itching around her neck.  Says her neck brace makes it worse.She is postop day 1.  She is moving all extremities.  Her pain is well controlled.  Her incision looks appropriate.  She is eating with no swallowing difficulties.  She will undergo her stage II procedure tomorrow for posterior fixation.   She will be n.p.o. at midnight.  .    ROS HENT: Negative.     Eyes: Negative.    Respiratory: Negative.     Cardiovascular: Negative.    Gastrointestinal: Negative.    Endocrine: Negative.    Genitourinary: Negative.    Musculoskeletal:  Positive for arthralgias.   Skin: Negative.    Allergic/Immunologic: Negative.    Neurological:  Positive for numbness (tingling/feet).   Hematological: Negative.    Psychiatric/Behavioral: Negative.          Objective      Vitals:   Temp:  [97.4 øF (36.3 øC)-98 øF (36.7 øC)] 97.4 øF (36.3 øC)  Heart Rate:  [69-86] 69  Resp:  [11-17] 17  BP: (123-155)/(61-98) 123/80  Flow (L/min):  [2-3] 2    Physical Exam  Vitals and nursing note reviewed.   Constitutional:       General: She is not in acute distress.     Appearance: Normal appearance. She is well-developed. She is not ill-appearing, toxic-appearing or diaphoretic.   HENT:      Head: Normocephalic and atraumatic.      Nose: Nose normal. No congestion or rhinorrhea.      Mouth/Throat:      Mouth: Mucous membranes are moist.      Pharynx: No oropharyngeal exudate.   Eyes:      General: No scleral icterus.        Right eye: No discharge.         Left eye: No discharge.      Extraocular Movements: Extraocular movements intact.      Conjunctiva/sclera: Conjunctivae normal.      Pupils: Pupils are equal, round, and reactive to light.   Neck:      Thyroid: No thyromegaly.      Vascular: No carotid bruit or JVD.      Trachea: No tracheal deviation.      Comments: Neck brace in place  Cardiovascular:      Rate and Rhythm: Normal rate and regular rhythm.      Pulses: Normal pulses.      Heart sounds: Normal heart sounds. No murmur heard.    No friction rub. No gallop.   Pulmonary:      Effort: Pulmonary effort is normal. No respiratory distress.      Breath sounds: Normal breath sounds. No stridor. No wheezing, rhonchi or rales.   Chest:      Chest wall: No tenderness.   Abdominal:      General: Bowel sounds are normal. There is no  distension.      Palpations: Abdomen is soft. There is no mass.      Tenderness: There is no abdominal tenderness. There is no guarding or rebound.      Hernia: No hernia is present.   Musculoskeletal:         General: No swelling, tenderness, deformity or signs of injury. Normal range of motion.      Cervical back: No rigidity. No muscular tenderness.      Right lower leg: No edema.      Left lower leg: No edema.   Lymphadenopathy:      Cervical: No cervical adenopathy.   Skin:     General: Skin is warm and dry.      Coloration: Skin is not jaundiced or pale.      Findings: Erythema present. No bruising or rash.      Comments: Erythema around the upper part of the neck.   Neurological:      General: No focal deficit present.      Mental Status: She is alert and oriented to person, place, and time. Mental status is at baseline.      Cranial Nerves: No cranial nerve deficit.      Sensory: No sensory deficit.      Motor: No weakness or abnormal muscle tone.      Coordination: Coordination normal.   Psychiatric:         Mood and Affect: Mood normal.         Behavior: Behavior normal.         Thought Content: Thought content normal.         Judgment: Judgment normal.           Result Review    Result Review:  I have personally reviewed the results from the time of this admission to 8/1/2023 15:30 EDT and agree with these findings:  []  Laboratory  []  Microbiology  []  Radiology  []  EKG/Telemetry   []  Cardiology/Vascular   []  Pathology  []  Old records  []  Other:  Most notable findings include:     Wounds (last 24 hours)       LDA Wound       Row Name 08/01/23 1200 08/01/23 0800 08/01/23 0400       Wound 07/31/23 0818 Right throat Incision    Wound - Properties Group Placement Date: 07/31/23  -LT Placement Time: 0818  -LT Side: Right  -LT Location: throat  -LT Primary Wound Type: Incision  -LT    Dressing Appearance dry;intact;no drainage  -DA dry;intact;no drainage  -DA --    Closure Adhesive bandage;ELOINA  -DA  Adhesive bandage;ELOINA  -DA Adhesive bandage;ELOINA  -MK    Base dressing in place, unable to visualize  -DA dressing in place, unable to visualize  -DA dressing in place, unable to visualize  -MK    Drainage Amount none  -DA none  -DA none  -MK    Dressing Care border dressing  -DA border dressing  -DA --    Retired Wound - Properties Group Placement Date: 07/31/23  -LT Placement Time: 0818  -LT Side: Right  -LT Location: throat  -LT Primary Wound Type: Incision  -LT    Retired Wound - Properties Group Date first assessed: 07/31/23  -LT Time first assessed: 0818  -LT Side: Right  -LT Location: throat  -LT Primary Wound Type: Incision  -LT      Row Name 08/01/23 0000 07/31/23 2000 07/31/23 1630       Wound 07/31/23 0818 Right throat Incision    Wound - Properties Group Placement Date: 07/31/23  -LT Placement Time: 0818  -LT Side: Right  -LT Location: throat  -LT Primary Wound Type: Incision  -LT    Dressing Appearance -- -- dry;intact;no drainage  -DA    Closure Adhesive bandage;ELOINA  -MK Adhesive bandage;ELOINA  -MK Adhesive bandage;ELOINA  -DA    Base dressing in place, unable to visualize  -MK dressing in place, unable to visualize  -MK dressing in place, unable to visualize  -DA    Drainage Amount none  -MK none  -MK none  -DA    Dressing Care -- -- border dressing  -DA    Retired Wound - Properties Group Placement Date: 07/31/23  -LT Placement Time: 0818  -LT Side: Right  -LT Location: throat  -LT Primary Wound Type: Incision  -LT    Retired Wound - Properties Group Date first assessed: 07/31/23  -LT Time first assessed: 0818  -LT Side: Right  -LT Location: throat  -LT Primary Wound Type: Incision  -LT      Row Name 07/31/23 1619 07/31/23 1553          Wound 07/31/23 0818 Right throat Incision    Wound - Properties Group Placement Date: 07/31/23  -LT Placement Time: 0818  -LT Side: Right  -LT Location: throat  -LT Primary Wound Type: Incision  -LT    Dressing Appearance dry;intact;no drainage  -SD dry;intact;no drainage   -SD     Closure Adhesive bandage;ELOINA  -SD Adhesive bandage;ELOINA  -SD     Base dressing in place, unable to visualize  -SD dressing in place, unable to visualize  -SD     Drainage Amount none  -SD none  -SD     Retired Wound - Properties Group Placement Date: 07/31/23  -LT Placement Time: 0818 -LT Side: Right  -LT Location: throat  -LT Primary Wound Type: Incision  -LT    Retired Wound - Properties Group Date first assessed: 07/31/23  -LT Time first assessed: 0818  -LT Side: Right  -LT Location: throat  -LT Primary Wound Type: Incision  -LT              User Key  (r) = Recorded By, (t) = Taken By, (c) = Cosigned By      Initials Name Provider Type    Mamie Burciaga RN Registered Nurse    Evon Saha RN Registered Nurse    Wendie Sheets RN Registered Nurse    Odalys Herrera, LPN Licensed Nurse                    CBC:      Lab 08/01/23  0555   WBC 7.60   HEMOGLOBIN 11.5*   HEMATOCRIT 35.2   PLATELETS 247   NEUTROS ABS 6.50   LYMPHS ABS 0.40*   MONOS ABS 0.70   EOS ABS 0.00   .5*     CMP:        Lab 08/01/23  0555   SODIUM 137   POTASSIUM 4.5   CHLORIDE 97*   CO2 30.0*   ANION GAP 10.0   BUN 9   CREATININE 0.54*   EGFR 96.1   GLUCOSE 165*   CALCIUM 8.8     No results found for: ACANTHNAEG, AFBCX, BPERTUSSISCX, BLOODCX  No results found for: BCIDPCR, CXREFLEX, CSFCX, CULTURETIS  No results found for: CULTURES, HSVCX, URCX  No results found for: EYECULTURE, GCCX, HSVCULTURE, LABHSV  No results found for: LEGIONELLA, MRSACX, MUMPSCX, MYCOPLASCX  No results found for: NOCARDIACX, STOOLCX  No results found for: THROATCX, UNSTIMCULT, URINECX, CULTURE, VZVCULTUR  No results found for: VIRALCULTU, WOUNDCX      Assessment & Plan      Brief Patient Summary:  Lori Sutton is a 73 y.o. female who       atorvastatin, 20 mg, Oral, Daily  budesonide-formoterol, 1 puff, Inhalation, BID - RT  citalopram, 20 mg, Oral, Daily  enoxaparin, 40 mg, Subcutaneous, Daily  famotidine, 20 mg, Oral,  BID  hydrALAZINE, 50 mg, Oral, TID  valsartan, 320 mg, Oral, Q24H   And  hydroCHLOROthiazide, 25 mg, Oral, Q24H  levothyroxine, 25 mcg, Oral, Daily  metoprolol succinate XL, 100 mg, Oral, Daily  montelukast, 10 mg, Oral, Daily  pantoprazole, 40 mg, Oral, Q AM  sodium chloride, 10 mL, Intravenous, Q12H             Active Hospital Problems:  Active Hospital Problems    Diagnosis     **Cervical spondylosis with myelopathy     Idiopathic progressive neuropathy     Hypertension     Hyperlipidemia     Back pain     Depression     Gout     Irritable bowel syndrome     Peripheral venous insufficiency      73-year-old female that is s/p C4-C6 ACDF with C5 corpectomy with Dr. Mcgowan.  POD#1  Plan:    Continue present care  Per neurosurgery will undergo her stage II procedure tomorrow for posterior fixation.  She will be n.p.o. at midnight.  She should continue to wear her c-collar at all times.  I will call the bracing rep for an extra collar to have available for when patient showers.  Pain management per neurosurgery  Continue mobilization efforts  Wound care   GI prophylaxis-Protonix  DVT Prophylaxis    Hyperlipidemia continue atorvastatin  Hypertension continue metoprolol, hydralazine   depression continue citalopram    DVT prophylaxis:  Medical and mechanical DVT prophylaxis orders are present.    CODE STATUS:         Disposition:  I expect patient to be discharged Referral to [facility], pending acceptance. No precert required. PASRR approved. .    I have utilized all available, immediate resources to obtain, update, or review the patient's current medications including all prescriptions, over-the-counter products, herbals, cannabis/cannabidiol products, and vitamin.mineral/dietary (nutritional) supplements.             Admission Status:  I believe this patient meets rehab status.    Hospital Medicine Quality Measures for Heart Failure:        This patient has been examined wearing appropriate Personal Protective Equipment and  discussed with  rn . 08/01/23      Electronically signed by Ramy Ruiz MD, 08/01/23, 15:30 EDT.  [facility]ist Team

## 2023-08-01 NOTE — THERAPY EVALUATION
Patient Name: Lori Sutton  : 1950    MRN: [id number]                              Today's Date: 2023       Admit Date: 2023    Visit Dx:     ICD-10-CM ICD-9-CM   1. Cervical spondylosis with myelopathy  M47.12 721.1     Patient Active Problem List   Diagnosis    Medicare annual wellness visit, subsequent    Hypertension    Hyperlipidemia    Fibrocystic breast    Back pain    Depression    Edema    Gout    Irritable bowel syndrome    Lymphedema of lower extremity    Peripheral venous insufficiency    Polyosteoarthritis    Reactive airways dysfunction syndrome    Cough    Idiopathic progressive neuropathy    Cervical spondylosis with myelopathy    Reactive airway disease with acute exacerbation     Past Medical History:   Diagnosis Date    Anxiety     Arthritis     Asthma     Constipation     COPD (chronic obstructive pulmonary disease)     DDD (degenerative disc disease), cervical     DDD (degenerative disc disease), lumbar     Disease of thyroid gland     Emphysema lung     Fibrocystic breast     Hard of hearing     wears hearing aids bilat    Hyperlipidemia     Hypertension     Lymphedema of both lower extremities     red and irritable    PONV (postoperative nausea and vomiting)     Seasonal allergies     Urinary incontinence      Past Surgical History:   Procedure Laterality Date    APPENDECTOMY      AUGMENTATION MAMMAPLASTY      BREAST CYST ASPIRATION      BREAST CYST EXCISION      HYSTERECTOMY      THYROIDECTOMY, PARTIAL      TONSILLECTOMY        General Information       Row Name 23 0903          General Information    Prior Level of Function independent:;all household mobility;community mobility;ADL's;home management;driving  -     Barriers to Rehab none identified  -       Row Name 23 0903          Living Environment    People in Home alone  -       Row Name 23 0903          Home Main Entrance    Number of Stairs, Main Entrance none;other (see comments)   states she has steep driveway  -       Row Name 08/01/23 0903          Stairs Within Home, Primary    Number of Stairs, Within Home, Primary none  -[facility] Name 08/01/23 0903          Cognition    Orientation Status (Cognition) oriented x 4  -       Row Name 08/01/23 0903          Safety Issues, Functional Mobility    Impairments Affecting Function (Mobility) balance;strength;shortness of breath;sensation/sensory awareness;range of motion (ROM);endurance/activity tolerance;pain  -               User Key  (r) = Recorded By, (t) = Taken By, (c) = Cosigned By      Initials Name Provider Type     Yue Barton, OT Occupational Therapist                     Mobility/ADL's       Row Name 08/01/23 0904          Bed Mobility    Comment, (Bed Mobility) up in chair. Pt states she did not need much assist.  -[facility] Name 08/01/23 0904          Transfers    Transfers sit-stand transfer  -[facility] Name 08/01/23 0904          Sit-Stand Transfer    Sit-Stand Barton (Transfers) contact guard  -[facility] Name 08/01/23 0904          Functional Mobility    Functional Mobility- Comment pt mobilizes w/ RW or HHA but reports loss of balance and increased weakness as what brought her to the need for spinal surgery.  -[facility] Name 08/01/23 0904          Activities of Daily Living    BADL Assessment/Intervention lower body dressing;feeding  -[facility] Name 08/01/23 0904          Lower Body Dressing Assessment/Training    Barton Level (Lower Body Dressing) lower body dressing skills;moderate assist (50% patient effort)  -[facility] Name 08/01/23 0904          Self-Feeding Assessment/Training    Barton Level (Feeding) feeding skills;modified independence  -[facility] Name 08/01/23 0904          Grooming Assessment/Training    Barton Level (Grooming) grooming skills;minimum assist (75% patient effort)  -               User Key  (r) = Recorded By, (t) = Taken By, (c) = Cosigned By       Initials Name Provider Type     Yue Barton, OT Occupational Therapist                   Obj/Interventions       Row Name 08/01/23 0909          Sensory Assessment (Somatosensory)    Sensory Subjective Reports numbness;tingling  -     Sensory Assessment pt reports no improvmenent yet, as expected  -       Row Name 08/01/23 0909          Vision Assessment/Intervention    Visual Impairment/Limitations WFL  -[facility] Name 08/01/23 0909          Range of Motion Comprehensive    Comment, General Range of Motion shld flex 50% and Cspine immobilized in Aspen collar  -       Row Name 08/01/23 0909          Strength Comprehensive (MMT)    Comment, General Manual Muscle Testing (MMT) Assessment BUE 4-/5; BLE 4-/5 but variable  -               User Key  (r) = Recorded By, (t) = Taken By, (c) = Cosigned By      Initials Name Provider Type     Yue Barton, OT Occupational Therapist                   Goals/Plan       Row Name 08/01/23 0918          Bathing Goal 1 (OT)    Activity/Device (Bathing Goal 1, OT) bathing skills, all  -MH     [place] Level/Cues Needed (Bathing Goal 1, OT) minimum assist (75% or more patient effort)  -     Time Frame (Bathing Goal 1, OT) 2 weeks  -[facility] Name 08/01/23 0918          Dressing Goal 1 (OT)    Activity/Device (Dressing Goal 1, OT) dressing skills, all  -MH     [place]/Cues Needed (Dressing Goal 1, OT) minimum assist (75% or more patient effort)  -     Time Frame (Dressing Goal 1, OT) 2 weeks  -MH       Row Name 08/01/23 0918          Toileting Goal 1 (OT)    Activity/Device (Toileting Goal 1, OT) toileting skills, all  -     [place] Level/Cues Needed (Toileting Goal 1, OT) set-up required  -     Time Frame (Toileting Goal 1, OT) 2 weeks  -       Row Name 08/01/23 0918          Therapy Assessment/Plan (OT)    Planned Therapy Interventions (OT) activity tolerance training;adaptive equipment training;BADL retraining;functional balance  retraining;neuromuscular control/coordination retraining;occupation/activity based interventions;transfer/mobility retraining;patient/caregiver education/training  -               User Key  (r) = Recorded By, (t) = Taken By, (c) = Cosigned By      Initials Name Provider Type    Yue Choe OT Occupational Therapist                   Clinical Impression       Row Name 08/01/23 0910          Pain Assessment    Pretreatment Pain Rating 4/10  -     Posttreatment Pain Rating 4/10  -     Pain Location incisional  -     Pre/Posttreatment Pain Comment and chin, which had been rubbed raw by washcloth placed to keep her C-collar lining clean. Cleaned area and applied vaseline, gauze dressing.  -       Row Name 08/01/23 0910          Plan of Care Review    Plan of Care Reviewed With patient  -     Progress no change  -     Outcome Evaluation Pt is a 73-year-old with a BMI of 33 as well as significant lung disease who has had a prior thyroidectomy and was a chronic everyday smoker at 2 packs/day for greater than 40 years.  She has had chronic conservative therapy including physical therapy multiple times.  She has had greater than 5 years of progressive numbness in her hands and feet.  She has progressive worsening of her dexterity as well as difficulty signing her name, buttoning a button, and picking up small items off of a table.  She drops items regularly.  She has significant balance issues as well as proprioceptive issues that are worsened on uneven ground as well as in the dark. Pt lives alone and is (I) w/o DME. Pt is POD 1 2-part C-spine surgery for deconpression of her cervical myelopathy. Part 1 was anterior approach and part 2 will be posterior approach. She is wearing a hard Aspen C-collar. She needs RW or HHA to mobilize and is eating with her hands because of difficulty using utensils. Her LB ADL skill is decreased and she has increased pain & SOA. Note wheezing with minimal activity and use  of O2 via NC which is not her baseline despite prior lung disease. Current D/C recommendation is ST IP rehab. Will FU after her second surgery for OT assessment and update D/C and equipment recommendations at that time.  -       Row Name 08/01/23 0910          Therapy Assessment/Plan (OT)    Rehab Potential (OT) good, to achieve stated therapy goals  -     Criteria for Skilled Therapeutic Interventions Met (OT) skilled treatment is necessary  -     Therapy Frequency (OT) 5 times/wk  -     Predicted Duration of Therapy Intervention (OT) until d/c  -       Row Name 08/01/23 0910          Therapy Plan Review/Discharge Plan (OT)    Equipment Needs Upon Discharge (OT) shower chair;raised toilet seat;walker, rolling  -     Anticipated Discharge Disposition (OT) inpatient rehabilitation facility  -       Row Name 08/01/23 0910          Positioning and Restraints    Pre-Treatment Position sitting in chair/recliner  -     Post Treatment Position chair  -     In Chair notified nsg;sitting;call light within reach;encouraged to call for assist  -               User Key  (r) = Recorded By, (t) = Taken By, (c) = Cosigned By      Initials Name Provider Type     Yue Barton, OT Occupational Therapist                   Outcome Measures       Row Name 08/01/23 0919          How much help from another is currently needed...    Putting on and taking off regular lower body clothing? 2  -MH     Bathing (including washing, rinsing, and drying) 2  -MH     Toileting (which includes using toilet bed pan or urinal) 2  -MH     Putting on and taking off regular upper body clothing 2  -MH     Taking care of personal grooming (such as brushing teeth) 3  -MH     Eating meals 3  -MH     AM-PAC 6 Clicks Score (OT) 14  -       Row Name 08/01/23 0919 08/01/23 0800       How much help from another person do you currently need...    Turning from your back to your side while in flat bed without using bedrails? 3  -MH 3  -DA     Moving from lying on back to sitting on the side of a flat bed without bedrails? 2  - 3  -DA    Moving to and from a bed to a chair (including a wheelchair)? 3  - 3  -DA    Standing up from a chair using your arms (e.g., wheelchair, bedside chair)? 3  - 3  -DA    Climbing 3-5 steps with a railing? 2  - 2  -DA    To walk in hospital room? 2  - 2  -DA    AM-PAC 6 Clicks Score (PT) 15  - 16  -DA    Highest level of mobility 4 --> Transferred to chair/commode  - 5 --> Static standing  -DA      Row Name 08/01/23 0919          Functional Assessment    Outcome Measure Options AM-PAC 6 Clicks Daily Activity (OT);AM-PAC 6 Clicks Basic Mobility (PT)  -               User Key  (r) = Recorded By, (t) = Taken By, (c) = Cosigned By      Initials Name Provider Type     Yue Barton, JORJE Occupational Therapist    Odalys Herrera LPN Licensed Nurse                    Occupational Therapy Education       Title: PT OT SLP Therapies (In Progress)       Topic: Occupational Therapy (In Progress)       Point: ADL training (Done)       Description:   Instruct learner(s) on proper safety adaptation and remediation techniques during self care or transfers.   Instruct in proper use of assistive devices.                  Learning Progress Summary             Patient Acceptance, E,TB,D, VU,DU,NR by  at 8/1/2023 0920                         Point: Home exercise program (Not Started)       Description:   Instruct learner(s) on appropriate technique for monitoring, assisting and/or progressing therapeutic exercises/activities.                  Learner Progress:  Not documented in this visit.              Point: Precautions (Done)       Description:   Instruct learner(s) on prescribed precautions during self-care and functional transfers.                  Learning Progress Summary             Patient Acceptance, E,TB,D, VU,DU,NR by  at 8/1/2023 0920                         Point: Body mechanics (Done)       Description:    Instruct learner(s) on proper positioning and spine alignment during self-care, functional mobility activities and/or exercises.                  Learning Progress Summary             Patient Acceptance, E,TB,D, VU,DU,NR by  at 8/1/2023 0920                                         User Key       Initials Effective Dates Name Provider Type Discipline     06/16/21 -  Yue Barton OT Occupational Therapist OT                  OT Recommendation and Plan  Planned Therapy Interventions (OT): activity tolerance training, adaptive equipment training, BADL retraining, functional balance retraining, neuromuscular control/coordination retraining, occupation/activity based interventions, transfer/mobility retraining, patient/caregiver education/training  Therapy Frequency (OT): 5 times/wk  Plan of Care Review  Plan of Care Reviewed With: patient  Progress: no change  Outcome Evaluation: Pt is a 73-year-old with a BMI of 33 as well as significant lung disease who has had a prior thyroidectomy and was a chronic everyday smoker at 2 packs/day for greater than 40 years.  She has had chronic conservative therapy including physical therapy multiple times.  She has had greater than 5 years of progressive numbness in her hands and feet.  She has progressive worsening of her dexterity as well as difficulty signing her name, buttoning a button, and picking up small items off of a table.  She drops items regularly.  She has significant balance issues as well as proprioceptive issues that are worsened on uneven ground as well as in the dark. Pt lives alone and is (I) w/o DME. Pt is POD 1 2-part C-spine surgery for deconpression of her cervical myelopathy. Part 1 was anterior approach and part 2 will be posterior approach. She is wearing a hard Aspen C-collar. She needs RW or HHA to mobilize and is eating with her hands because of difficulty using utensils. Her LB ADL skill is decreased and she has increased pain & SOA. Note  wheezing with minimal activity and use of O2 via NC which is not her baseline despite prior lung disease. Current D/C recommendation is ST  rehab. Will FU after her second surgery for OT assessment and update D/C and equipment recommendations at that time.     Time Calculation:         Time Calculation- OT       Row Name 08/01/23 0921             Time Calculation- OT    OT Start Time 0844  -      OT Stop Time 0900  -      OT Time Calculation (min) 16 min  -      Total Timed Code Minutes- OT 0 minute(s)  -      OT Received On 08/01/23  -      OT - Next Appointment 08/02/23  -      OT Goal Re-Cert Due Date 08/15/23  -                User Key  (r) = Recorded By, (t) = Taken By, (c) = Cosigned By      Initials Name Provider Type     Yue Barton OT Occupational Therapist                  Therapy Charges for Today       Code Description Service Date Service Provider Modifiers Qty    29204280119 HC OT EVAL MOD COMPLEXITY 3 8/1/2023 Yue Barton OT GO 1                 Yue Barton OT  8/1/2023

## 2023-08-01 NOTE — PLAN OF CARE
Goal Outcome Evaluation:  Plan of Care Reviewed With: patient        Progress: improving          Up in the chair with c-collar in place. Pain controlled with oral meds. Tolerating diet without diff. Plan for surgery in am.

## 2023-08-01 NOTE — PLAN OF CARE
Goal Outcome Evaluation:   Pt resting abed with c collar in place, hob elevated at 30 degrees as ordered. Pt reports pain and discomfort frequently, medication administered, and repositioning provided. No s/s of acute distress. Will monitor.

## 2023-08-01 NOTE — DISCHARGE PLACEMENT REQUEST
"Lori Robertson (73 y.o. Female)       Date of Birth   1950    Social Security Number       Address   [address] [place], [place] [address] [facility] [place] [facility]    Home Phone   [phone]    MRN   [id number]       Jehovah's witness   None    Marital Status                               Admission Date   7/31/23    Admission Type   Elective    Admitting Provider   Wilber Mcgowan MD    Attending Provider   Ramy Ruiz MD    Department, Room/Bed   [facility] SURGICAL INPATIENT, 4127/1       Discharge Date       Discharge Disposition       Discharge Destination                                 Attending Provider: Ramy Ruiz MD    Allergies: Nsaids, Lisinopril    Isolation: None   Infection: None   Code Status: Not on file    Ht: 157.5 cm (62\")   Wt: 84.3 kg (185 lb 12.8 oz)    Admission Cmt: None   Principal Problem: Cervical spondylosis with myelopathy [M47.12]                   Active Insurance as of 7/31/2023       Primary Coverage       Payor Plan Insurance Group Employer/Plan Group    MEDICARE MEDICARE A & B        Payor Plan Address Payor Plan Phone Number Payor Plan Fax Number Effective Dates    [address] [phone]  2/1/2013 - None Entered    [facility] [address]         Subscriber Name Subscriber Birth Date Member ID       LORI ROBERTSON 1950 [id number]               Secondary Coverage       Payor Plan Insurance Group Employer/Plan Group    [employer]ARP [employer]P [employer] HEALTH CARE OPTIONS PLAN F       Payor Plan Address Payor Plan Phone Number Payor Plan Fax Number Effective Dates    [facility] [phone]  1/1/2019 - None Entered    [address]       [facility] [address]         Subscriber Name Subscriber Birth Date Member ID       LORI ROBERTSON 1950 [id number]                     Emergency Contacts        (Rel.) Home Phone Work Phone Mobile Phone    darrius hester (Other) [phone] -- --    LEO LADD (Sister) -- -- [phone]    RAMAKRISHNA LADD (Relative) -- -- " [phone]

## 2023-08-02 ENCOUNTER — APPOINTMENT (OUTPATIENT)
Dept: GENERAL RADIOLOGY | Facility: HOSPITAL | Age: 73
DRG: 455 | End: 2023-08-02
Payer: MEDICARE

## 2023-08-02 ENCOUNTER — ANESTHESIA (OUTPATIENT)
Dept: PERIOP | Facility: HOSPITAL | Age: 73
DRG: 455 | End: 2023-08-02
Payer: MEDICARE

## 2023-08-02 PROCEDURE — 25010000002 HYDROMORPHONE PER 4 MG: Performed by: NURSE ANESTHETIST, CERTIFIED REGISTERED

## 2023-08-02 PROCEDURE — 25010000002 PROPOFOL 10 MG/ML EMULSION: Performed by: NURSE ANESTHETIST, CERTIFIED REGISTERED

## 2023-08-02 PROCEDURE — 25010000002 SUGAMMADEX 200 MG/2ML SOLUTION: Performed by: NURSE ANESTHETIST, CERTIFIED REGISTERED

## 2023-08-02 PROCEDURE — 72040 X-RAY EXAM NECK SPINE 2-3 VW: CPT

## 2023-08-02 PROCEDURE — P9041 ALBUMIN (HUMAN),5%, 50ML: HCPCS | Performed by: NURSE ANESTHETIST, CERTIFIED REGISTERED

## 2023-08-02 PROCEDURE — 22614 ARTHRD PST TQ 1NTRSPC EA ADD: CPT | Performed by: NEUROLOGICAL SURGERY

## 2023-08-02 PROCEDURE — 25010000002 HYDROMORPHONE 1 MG/ML SOLUTION: Performed by: NURSE ANESTHETIST, CERTIFIED REGISTERED

## 2023-08-02 PROCEDURE — C1713 ANCHOR/SCREW BN/BN,TIS/BN: HCPCS | Performed by: NEUROLOGICAL SURGERY

## 2023-08-02 PROCEDURE — 25010000002 SUCCINYLCHOLINE PER 20 MG: Performed by: NURSE ANESTHETIST, CERTIFIED REGISTERED

## 2023-08-02 PROCEDURE — L0174 CERV SR 2PC THOR EXT PRE OTS: HCPCS | Performed by: NEUROLOGICAL SURGERY

## 2023-08-02 PROCEDURE — 00NW0ZZ RELEASE CERVICAL SPINAL CORD, OPEN APPROACH: ICD-10-PCS | Performed by: NEUROLOGICAL SURGERY

## 2023-08-02 PROCEDURE — 25010000002 CEFAZOLIN PER 500 MG: Performed by: NEUROLOGICAL SURGERY

## 2023-08-02 PROCEDURE — 25010000002 VANCOMYCIN 1 G RECONSTITUTED SOLUTION: Performed by: NEUROLOGICAL SURGERY

## 2023-08-02 PROCEDURE — 25010000002 DEXAMETHASONE PER 1 MG: Performed by: NURSE ANESTHETIST, CERTIFIED REGISTERED

## 2023-08-02 PROCEDURE — 25010000002 PHENYLEPHRINE 10 MG/ML SOLUTION 5 ML VIAL: Performed by: NURSE ANESTHETIST, CERTIFIED REGISTERED

## 2023-08-02 PROCEDURE — 22842 INSERT SPINE FIXATION DEVICE: CPT | Performed by: NEUROLOGICAL SURGERY

## 2023-08-02 PROCEDURE — 25010000002 FENTANYL CITRATE (PF) 100 MCG/2ML SOLUTION: Performed by: NURSE ANESTHETIST, CERTIFIED REGISTERED

## 2023-08-02 PROCEDURE — 25010000002 PROPOFOL 200 MG/20ML EMULSION: Performed by: NURSE ANESTHETIST, CERTIFIED REGISTERED

## 2023-08-02 PROCEDURE — 0RG20K1 FUSION OF 2 OR MORE CERVICAL VERTEBRAL JOINTS WITH NONAUTOLOGOUS TISSUE SUBSTITUTE, POSTERIOR APPROACH, POSTERIOR COLUMN, OPEN APPROACH: ICD-10-PCS | Performed by: NEUROLOGICAL SURGERY

## 2023-08-02 PROCEDURE — 25010000002 PHENYLEPHRINE 10 MG/ML SOLUTION: Performed by: NURSE ANESTHETIST, CERTIFIED REGISTERED

## 2023-08-02 PROCEDURE — 25010000002 ALBUMIN HUMAN 5% PER 50 ML: Performed by: NURSE ANESTHETIST, CERTIFIED REGISTERED

## 2023-08-02 PROCEDURE — 94761 N-INVAS EAR/PLS OXIMETRY MLT: CPT

## 2023-08-02 PROCEDURE — 25010000002 ONDANSETRON PER 1 MG: Performed by: NURSE ANESTHETIST, CERTIFIED REGISTERED

## 2023-08-02 PROCEDURE — 22600 ARTHRD PST TQ 1NTRSPC CRV: CPT | Performed by: NEUROLOGICAL SURGERY

## 2023-08-02 PROCEDURE — 76000 FLUOROSCOPY <1 HR PHYS/QHP: CPT

## 2023-08-02 PROCEDURE — 94799 UNLISTED PULMONARY SVC/PX: CPT

## 2023-08-02 PROCEDURE — 25010000002 MIDAZOLAM PER 1 MG: Performed by: NURSE ANESTHETIST, CERTIFIED REGISTERED

## 2023-08-02 PROCEDURE — 63015 REMOVE SPINE LAMINA >2 CRVCL: CPT | Performed by: NEUROLOGICAL SURGERY

## 2023-08-02 DEVICE — IMPLANTABLE DEVICE: Type: IMPLANTABLE DEVICE | Site: SPINE CERVICAL | Status: FUNCTIONAL

## 2023-08-02 DEVICE — ALLOGRFT FIBR OSTEOAMP SELECT 2.5CC: Type: IMPLANTABLE DEVICE | Site: SPINE CERVICAL | Status: FUNCTIONAL

## 2023-08-02 DEVICE — SCRW SPINE/OCT SYMPHONY SET: Type: IMPLANTABLE DEVICE | Site: SPINE CERVICAL | Status: FUNCTIONAL

## 2023-08-02 DEVICE — SCRW POLY SPINE/OCT SYMPHONY 3.5X12MM: Type: IMPLANTABLE DEVICE | Site: SPINE CERVICAL | Status: FUNCTIONAL

## 2023-08-02 RX ORDER — IPRATROPIUM BROMIDE AND ALBUTEROL SULFATE 2.5; .5 MG/3ML; MG/3ML
3 SOLUTION RESPIRATORY (INHALATION) ONCE
Status: COMPLETED | OUTPATIENT
Start: 2023-08-02 | End: 2023-08-02

## 2023-08-02 RX ORDER — ENOXAPARIN SODIUM 100 MG/ML
40 INJECTION SUBCUTANEOUS DAILY
Status: DISCONTINUED | OUTPATIENT
Start: 2023-08-03 | End: 2023-08-04 | Stop reason: HOSPADM

## 2023-08-02 RX ORDER — ALBUMIN, HUMAN INJ 5% 5 %
SOLUTION INTRAVENOUS CONTINUOUS PRN
Status: DISCONTINUED | OUTPATIENT
Start: 2023-08-02 | End: 2023-08-02 | Stop reason: SURG

## 2023-08-02 RX ORDER — HYDRALAZINE HYDROCHLORIDE 20 MG/ML
5 INJECTION INTRAMUSCULAR; INTRAVENOUS
Status: DISCONTINUED | OUTPATIENT
Start: 2023-08-02 | End: 2023-08-02 | Stop reason: HOSPADM

## 2023-08-02 RX ORDER — LIDOCAINE HYDROCHLORIDE 40 MG/ML
SOLUTION TOPICAL AS NEEDED
Status: DISCONTINUED | OUTPATIENT
Start: 2023-08-02 | End: 2023-08-02 | Stop reason: SURG

## 2023-08-02 RX ORDER — EPHEDRINE SULFATE 5 MG/ML
INJECTION INTRAVENOUS AS NEEDED
Status: DISCONTINUED | OUTPATIENT
Start: 2023-08-02 | End: 2023-08-02 | Stop reason: SURG

## 2023-08-02 RX ORDER — VANCOMYCIN HYDROCHLORIDE 1 G/20ML
INJECTION, POWDER, LYOPHILIZED, FOR SOLUTION INTRAVENOUS AS NEEDED
Status: DISCONTINUED | OUTPATIENT
Start: 2023-08-02 | End: 2023-08-02 | Stop reason: HOSPADM

## 2023-08-02 RX ORDER — LIDOCAINE HYDROCHLORIDE 10 MG/ML
INJECTION, SOLUTION EPIDURAL; INFILTRATION; INTRACAUDAL; PERINEURAL AS NEEDED
Status: DISCONTINUED | OUTPATIENT
Start: 2023-08-02 | End: 2023-08-02 | Stop reason: SURG

## 2023-08-02 RX ORDER — GINSENG 100 MG
CAPSULE ORAL AS NEEDED
Status: DISCONTINUED | OUTPATIENT
Start: 2023-08-02 | End: 2023-08-02 | Stop reason: HOSPADM

## 2023-08-02 RX ORDER — EPHEDRINE SULFATE 5 MG/ML
5 INJECTION INTRAVENOUS ONCE AS NEEDED
Status: DISCONTINUED | OUTPATIENT
Start: 2023-08-02 | End: 2023-08-02 | Stop reason: HOSPADM

## 2023-08-02 RX ORDER — SODIUM CHLORIDE, SODIUM LACTATE, POTASSIUM CHLORIDE, CALCIUM CHLORIDE 600; 310; 30; 20 MG/100ML; MG/100ML; MG/100ML; MG/100ML
INJECTION, SOLUTION INTRAVENOUS CONTINUOUS PRN
Status: DISCONTINUED | OUTPATIENT
Start: 2023-08-02 | End: 2023-08-02 | Stop reason: SURG

## 2023-08-02 RX ORDER — HYDROMORPHONE HYDROCHLORIDE 2 MG/ML
INJECTION, SOLUTION INTRAMUSCULAR; INTRAVENOUS; SUBCUTANEOUS AS NEEDED
Status: DISCONTINUED | OUTPATIENT
Start: 2023-08-02 | End: 2023-08-02 | Stop reason: SURG

## 2023-08-02 RX ORDER — NALOXONE HCL 0.4 MG/ML
0.4 VIAL (ML) INJECTION AS NEEDED
Status: DISCONTINUED | OUTPATIENT
Start: 2023-08-02 | End: 2023-08-02 | Stop reason: HOSPADM

## 2023-08-02 RX ORDER — OXYCODONE HYDROCHLORIDE 5 MG/1
5 TABLET ORAL ONCE AS NEEDED
Status: DISCONTINUED | OUTPATIENT
Start: 2023-08-02 | End: 2023-08-02 | Stop reason: HOSPADM

## 2023-08-02 RX ORDER — DEXMEDETOMIDINE HYDROCHLORIDE 100 UG/ML
INJECTION, SOLUTION INTRAVENOUS AS NEEDED
Status: DISCONTINUED | OUTPATIENT
Start: 2023-08-02 | End: 2023-08-02 | Stop reason: SURG

## 2023-08-02 RX ORDER — ONDANSETRON 2 MG/ML
4 INJECTION INTRAMUSCULAR; INTRAVENOUS ONCE AS NEEDED
Status: DISCONTINUED | OUTPATIENT
Start: 2023-08-02 | End: 2023-08-02 | Stop reason: HOSPADM

## 2023-08-02 RX ORDER — PROPOFOL 10 MG/ML
INJECTION, EMULSION INTRAVENOUS AS NEEDED
Status: DISCONTINUED | OUTPATIENT
Start: 2023-08-02 | End: 2023-08-02 | Stop reason: SURG

## 2023-08-02 RX ORDER — DIPHENHYDRAMINE HYDROCHLORIDE 50 MG/ML
12.5 INJECTION INTRAMUSCULAR; INTRAVENOUS ONCE AS NEEDED
Status: DISCONTINUED | OUTPATIENT
Start: 2023-08-02 | End: 2023-08-02 | Stop reason: HOSPADM

## 2023-08-02 RX ORDER — KETAMINE HYDROCHLORIDE 10 MG/ML
INJECTION INTRAMUSCULAR; INTRAVENOUS AS NEEDED
Status: DISCONTINUED | OUTPATIENT
Start: 2023-08-02 | End: 2023-08-02 | Stop reason: SURG

## 2023-08-02 RX ORDER — LIDOCAINE HYDROCHLORIDE AND EPINEPHRINE 10; 10 MG/ML; UG/ML
INJECTION, SOLUTION INFILTRATION; PERINEURAL AS NEEDED
Status: DISCONTINUED | OUTPATIENT
Start: 2023-08-02 | End: 2023-08-02 | Stop reason: HOSPADM

## 2023-08-02 RX ORDER — DIPHENHYDRAMINE HYDROCHLORIDE 50 MG/ML
12.5 INJECTION INTRAMUSCULAR; INTRAVENOUS
Status: DISCONTINUED | OUTPATIENT
Start: 2023-08-02 | End: 2023-08-02 | Stop reason: HOSPADM

## 2023-08-02 RX ORDER — ONDANSETRON 2 MG/ML
INJECTION INTRAMUSCULAR; INTRAVENOUS AS NEEDED
Status: DISCONTINUED | OUTPATIENT
Start: 2023-08-02 | End: 2023-08-02 | Stop reason: SURG

## 2023-08-02 RX ORDER — OXYCODONE HYDROCHLORIDE 5 MG/1
10 TABLET ORAL EVERY 4 HOURS PRN
Status: DISCONTINUED | OUTPATIENT
Start: 2023-08-02 | End: 2023-08-02 | Stop reason: HOSPADM

## 2023-08-02 RX ORDER — DEXAMETHASONE SODIUM PHOSPHATE 4 MG/ML
INJECTION, SOLUTION INTRA-ARTICULAR; INTRALESIONAL; INTRAMUSCULAR; INTRAVENOUS; SOFT TISSUE AS NEEDED
Status: DISCONTINUED | OUTPATIENT
Start: 2023-08-02 | End: 2023-08-02 | Stop reason: SURG

## 2023-08-02 RX ORDER — SUCCINYLCHOLINE CHLORIDE 20 MG/ML
INJECTION INTRAMUSCULAR; INTRAVENOUS AS NEEDED
Status: DISCONTINUED | OUTPATIENT
Start: 2023-08-02 | End: 2023-08-02 | Stop reason: SURG

## 2023-08-02 RX ORDER — FENTANYL CITRATE 50 UG/ML
INJECTION, SOLUTION INTRAMUSCULAR; INTRAVENOUS AS NEEDED
Status: DISCONTINUED | OUTPATIENT
Start: 2023-08-02 | End: 2023-08-02 | Stop reason: SURG

## 2023-08-02 RX ORDER — ROCURONIUM BROMIDE 10 MG/ML
INJECTION, SOLUTION INTRAVENOUS AS NEEDED
Status: DISCONTINUED | OUTPATIENT
Start: 2023-08-02 | End: 2023-08-02 | Stop reason: SURG

## 2023-08-02 RX ORDER — PHENYLEPHRINE HYDROCHLORIDE 10 MG/ML
INJECTION INTRAVENOUS AS NEEDED
Status: DISCONTINUED | OUTPATIENT
Start: 2023-08-02 | End: 2023-08-02 | Stop reason: SURG

## 2023-08-02 RX ORDER — LABETALOL HYDROCHLORIDE 5 MG/ML
5 INJECTION, SOLUTION INTRAVENOUS
Status: DISCONTINUED | OUTPATIENT
Start: 2023-08-02 | End: 2023-08-02 | Stop reason: HOSPADM

## 2023-08-02 RX ORDER — IPRATROPIUM BROMIDE AND ALBUTEROL SULFATE 2.5; .5 MG/3ML; MG/3ML
3 SOLUTION RESPIRATORY (INHALATION) ONCE AS NEEDED
Status: DISCONTINUED | OUTPATIENT
Start: 2023-08-02 | End: 2023-08-02 | Stop reason: HOSPADM

## 2023-08-02 RX ORDER — MIDAZOLAM HYDROCHLORIDE 1 MG/ML
INJECTION INTRAMUSCULAR; INTRAVENOUS AS NEEDED
Status: DISCONTINUED | OUTPATIENT
Start: 2023-08-02 | End: 2023-08-02 | Stop reason: SURG

## 2023-08-02 RX ORDER — GLYCOPYRROLATE 0.2 MG/ML
INJECTION INTRAMUSCULAR; INTRAVENOUS AS NEEDED
Status: DISCONTINUED | OUTPATIENT
Start: 2023-08-02 | End: 2023-08-02 | Stop reason: SURG

## 2023-08-02 RX ADMIN — FENTANYL CITRATE 50 MCG: 50 INJECTION, SOLUTION INTRAMUSCULAR; INTRAVENOUS at 09:25

## 2023-08-02 RX ADMIN — DEXMEDETOMIDINE HYDROCHLORIDE 2 MCG: 100 INJECTION, SOLUTION INTRAVENOUS at 12:59

## 2023-08-02 RX ADMIN — PHENYLEPHRINE HYDROCHLORIDE 200 MCG: 10 INJECTION INTRAVENOUS at 09:02

## 2023-08-02 RX ADMIN — PROPOFOL INJECTABLE EMULSION 150 MCG/KG/MIN: 10 INJECTION, EMULSION INTRAVENOUS at 09:03

## 2023-08-02 RX ADMIN — DEXAMETHASONE SODIUM PHOSPHATE 4 MG: 4 INJECTION, SOLUTION INTRAMUSCULAR; INTRAVENOUS at 13:05

## 2023-08-02 RX ADMIN — ALBUMIN HUMAN: 0.05 INJECTION, SOLUTION INTRAVENOUS at 08:27

## 2023-08-02 RX ADMIN — ONDANSETRON 4 MG: 2 INJECTION INTRAMUSCULAR; INTRAVENOUS at 13:05

## 2023-08-02 RX ADMIN — DEXAMETHASONE SODIUM PHOSPHATE 4 MG: 4 INJECTION, SOLUTION INTRAMUSCULAR; INTRAVENOUS at 08:10

## 2023-08-02 RX ADMIN — EPHEDRINE SULFATE 10 MG: 5 INJECTION INTRAVENOUS at 08:36

## 2023-08-02 RX ADMIN — MIDAZOLAM 2 MG: 1 INJECTION INTRAMUSCULAR; INTRAVENOUS at 08:04

## 2023-08-02 RX ADMIN — HYDROMORPHONE HYDROCHLORIDE 0.5 MG: 2 INJECTION, SOLUTION INTRAMUSCULAR; INTRAVENOUS; SUBCUTANEOUS at 10:12

## 2023-08-02 RX ADMIN — REMIFENTANIL HYDROCHLORIDE 0.1 MCG/KG/MIN: 1 INJECTION, POWDER, LYOPHILIZED, FOR SOLUTION INTRAVENOUS at 10:24

## 2023-08-02 RX ADMIN — Medication 25 MG: at 10:01

## 2023-08-02 RX ADMIN — SUCCINYLCHOLINE CHLORIDE 140 MG: 20 INJECTION, SOLUTION INTRAMUSCULAR; INTRAVENOUS at 08:05

## 2023-08-02 RX ADMIN — ONDANSETRON 4 MG: 2 INJECTION INTRAMUSCULAR; INTRAVENOUS at 08:10

## 2023-08-02 RX ADMIN — CEFAZOLIN 2 MG: 2 INJECTION, POWDER, FOR SOLUTION INTRAMUSCULAR; INTRAVENOUS at 12:09

## 2023-08-02 RX ADMIN — DEXMEDETOMIDINE HYDROCHLORIDE 2 MCG: 100 INJECTION, SOLUTION INTRAVENOUS at 13:04

## 2023-08-02 RX ADMIN — DEXMEDETOMIDINE HYDROCHLORIDE 2 MCG: 100 INJECTION, SOLUTION INTRAVENOUS at 13:14

## 2023-08-02 RX ADMIN — HYDROMORPHONE HYDROCHLORIDE 0.5 MG: 2 INJECTION, SOLUTION INTRAMUSCULAR; INTRAVENOUS; SUBCUTANEOUS at 10:05

## 2023-08-02 RX ADMIN — FAMOTIDINE 20 MG: 20 TABLET, FILM COATED ORAL at 20:23

## 2023-08-02 RX ADMIN — HYDRALAZINE HYDROCHLORIDE 50 MG: 25 TABLET, FILM COATED ORAL at 16:52

## 2023-08-02 RX ADMIN — FENTANYL CITRATE 50 MCG: 50 INJECTION, SOLUTION INTRAMUSCULAR; INTRAVENOUS at 12:10

## 2023-08-02 RX ADMIN — DEXMEDETOMIDINE HYDROCHLORIDE 2 MCG: 100 INJECTION, SOLUTION INTRAVENOUS at 13:19

## 2023-08-02 RX ADMIN — LIDOCAINE HYDROCHLORIDE 50 MG: 10 INJECTION, SOLUTION EPIDURAL; INFILTRATION; INTRACAUDAL; PERINEURAL at 08:05

## 2023-08-02 RX ADMIN — DEXMEDETOMIDINE HYDROCHLORIDE 2 MCG: 100 INJECTION, SOLUTION INTRAVENOUS at 13:09

## 2023-08-02 RX ADMIN — GLYCOPYRROLATE 0.1 MG: 0.2 INJECTION INTRAMUSCULAR; INTRAVENOUS at 11:19

## 2023-08-02 RX ADMIN — FENTANYL CITRATE 50 MCG: 50 INJECTION, SOLUTION INTRAMUSCULAR; INTRAVENOUS at 13:20

## 2023-08-02 RX ADMIN — Medication 25 MG: at 09:45

## 2023-08-02 RX ADMIN — LIDOCAINE HYDROCHLORIDE 1 EACH: 40 SOLUTION TOPICAL at 08:06

## 2023-08-02 RX ADMIN — SODIUM CHLORIDE 50 ML/HR: 9 INJECTION, SOLUTION INTRAVENOUS at 08:16

## 2023-08-02 RX ADMIN — PHENYLEPHRINE HYDROCHLORIDE 1 MCG/KG/MIN: 10 INJECTION INTRAVENOUS at 09:02

## 2023-08-02 RX ADMIN — BUDESONIDE AND FORMOTEROL FUMARATE DIHYDRATE 1 PUFF: 160; 4.5 AEROSOL RESPIRATORY (INHALATION) at 20:45

## 2023-08-02 RX ADMIN — CEFAZOLIN 2000 MG: 2 INJECTION, POWDER, FOR SOLUTION INTRAMUSCULAR; INTRAVENOUS at 08:09

## 2023-08-02 RX ADMIN — SUGAMMADEX 100 MG: 100 INJECTION, SOLUTION INTRAVENOUS at 13:26

## 2023-08-02 RX ADMIN — HYDROCODONE BITARTRATE AND ACETAMINOPHEN 1 TABLET: 5; 325 TABLET ORAL at 20:22

## 2023-08-02 RX ADMIN — Medication 10 ML: at 20:23

## 2023-08-02 RX ADMIN — PROPOFOL 100 MG: 10 INJECTION, EMULSION INTRAVENOUS at 08:05

## 2023-08-02 RX ADMIN — PHENYLEPHRINE HYDROCHLORIDE 200 MCG: 10 INJECTION INTRAVENOUS at 08:33

## 2023-08-02 RX ADMIN — SODIUM CHLORIDE, SODIUM LACTATE, POTASSIUM CHLORIDE, AND CALCIUM CHLORIDE: .6; .31; .03; .02 INJECTION, SOLUTION INTRAVENOUS at 08:00

## 2023-08-02 RX ADMIN — HYDRALAZINE HYDROCHLORIDE 50 MG: 25 TABLET, FILM COATED ORAL at 20:23

## 2023-08-02 RX ADMIN — HYDROMORPHONE HYDROCHLORIDE 0.5 MG: 1 INJECTION, SOLUTION INTRAMUSCULAR; INTRAVENOUS; SUBCUTANEOUS at 14:02

## 2023-08-02 RX ADMIN — FENTANYL CITRATE 50 MCG: 50 INJECTION, SOLUTION INTRAMUSCULAR; INTRAVENOUS at 08:05

## 2023-08-02 RX ADMIN — IPRATROPIUM BROMIDE AND ALBUTEROL SULFATE 3 ML: .5; 3 SOLUTION RESPIRATORY (INHALATION) at 07:18

## 2023-08-02 RX ADMIN — ROCURONIUM BROMIDE 10 MG: 10 INJECTION, SOLUTION INTRAVENOUS at 08:05

## 2023-08-02 NOTE — PROGRESS NOTES
[facility] Medicine Services Daily Progress Note    Patient Name: Lori Sutton  : 1950  MRN: [id number]  Primary Care Physician:  Ayaz Luna MD  Date of admission: 2023      Subjective      Chief Complaint: Neck pain     73-year-old woman with a BMI of 33 as well as significant lung disease who has had a prior thyroidectomy and was a chronic everyday smoker at 2 packs/day for greater than 40 years.  She comments that approximately 20 years ago she was offered neck surgery for which she turned down.  She has had chronic conservative therapy including physical therapy multiple times.  She has had greater than 5 years of progressive numbness in her hands and feet.  She has progressive worsening of her dexterity as well as difficulty signing her name, buttoning a button as well as picking up small items off of a table.  She drops items regularly.  She has significant balance issues as well as proprioceptive issues that are worsened on uneven ground as well as in the dark.  I last evaluated her on 2023.  When I initially evaluated her in February she had a possible C5 radiculopathy but that distribution the pain had significantly improved by the time I saw her in April.  MI April appointment I recommended surgical decompression but she would need significant clearance including pulmonary clearance.  She has seen her pulmonologist and undergone pulmonary function testing and has been cleared.  She comments that her myelopathic symptoms continue to slowly progress.       2023 patient seen and examined in bed no acute distress, vital signs stable, she is complaining of itching around her neck.  Says her neck brace makes it worse.She is postop day 1.  She is moving all extremities.  Her pain is well controlled.  Her incision looks appropriate.  She is eating with no swallowing difficulties.  She will undergo her stage II procedure tomorrow for posterior fixation.     8/2/2023 patient seen in bed no acute distress, vital signs stable, for surgery today.  Discussed with TARI LEWIS HENT: Negative.     Eyes: Negative.    Respiratory: Negative.     Cardiovascular: Negative.    Gastrointestinal: Negative.    Endocrine: Negative.    Genitourinary: Negative.    Musculoskeletal:  Positive for arthralgias.   Skin: Negative.    Allergic/Immunologic: Negative.    Neurological:  Positive for numbness (tingling/feet).   Hematological: Negative.    Psychiatric/Behavioral: Negative.          Objective      Vitals:   Temp:  [97.7 øF (36.5 øC)-97.9 øF (36.6 øC)] 97.9 øF (36.6 øC)  Heart Rate:  [68-82] 68  Resp:  [13-18] 18  BP: (153-156)/(72-78) 154/73  Flow (L/min):  [1-2] 2    Physical Exam  Vitals and nursing note reviewed.   Constitutional:       General: She is not in acute distress.     Appearance: Normal appearance. She is well-developed. She is not ill-appearing, toxic-appearing or diaphoretic.   HENT:      Head: Normocephalic and atraumatic.      Nose: Nose normal. No congestion or rhinorrhea.      Mouth/Throat:      Mouth: Mucous membranes are moist.      Pharynx: No oropharyngeal exudate.   Eyes:      General: No scleral icterus.        Right eye: No discharge.         Left eye: No discharge.      Extraocular Movements: Extraocular movements intact.      Conjunctiva/sclera: Conjunctivae normal.      Pupils: Pupils are equal, round, and reactive to light.   Neck:      Thyroid: No thyromegaly.      Vascular: No carotid bruit or JVD.      Trachea: No tracheal deviation.      Comments: Neck brace in place  Cardiovascular:      Rate and Rhythm: Normal rate and regular rhythm.      Pulses: Normal pulses.      Heart sounds: Normal heart sounds. No murmur heard.    No friction rub. No gallop.   Pulmonary:      Effort: Pulmonary effort is normal. No respiratory distress.      Breath sounds: Normal breath sounds. No stridor. No wheezing, rhonchi or rales.   Chest:      Chest wall: No  tenderness.   Abdominal:      General: Bowel sounds are normal. There is no distension.      Palpations: Abdomen is soft. There is no mass.      Tenderness: There is no abdominal tenderness. There is no guarding or rebound.      Hernia: No hernia is present.   Musculoskeletal:         General: No swelling, tenderness, deformity or signs of injury. Normal range of motion.      Cervical back: No rigidity. No muscular tenderness.      Right lower leg: No edema.      Left lower leg: No edema.   Lymphadenopathy:      Cervical: No cervical adenopathy.   Skin:     General: Skin is warm and dry.      Coloration: Skin is not jaundiced or pale.      Findings: Erythema present. No bruising or rash.      Comments: Erythema around the upper part of the neck.   Neurological:      General: No focal deficit present.      Mental Status: She is alert and oriented to person, place, and time. Mental status is at baseline.      Cranial Nerves: No cranial nerve deficit.      Sensory: No sensory deficit.      Motor: No weakness or abnormal muscle tone.      Coordination: Coordination normal.   Psychiatric:         Mood and Affect: Mood normal.         Behavior: Behavior normal.         Thought Content: Thought content normal.         Judgment: Judgment normal.           Result Review    Result Review:  I have personally reviewed the results from the time of this admission to 8/2/2023 12:07 EDT and agree with these findings:  []  Laboratory  []  Microbiology  []  Radiology  []  EKG/Telemetry   []  Cardiology/Vascular   []  Pathology  []  Old records  []  Other:  Most notable findings include:     Wounds (last 24 hours)       LDA Wound       Row Name 08/02/23 0447 08/02/23 0030 08/01/23 2039       Wound 07/31/23 0818 Right throat Incision    Wound - Properties Group Placement Date: 07/31/23  -LT Placement Time: 0818  -LT Side: Right  -LT Location: throat  -LT Primary Wound Type: Incision  -LT    Dressing Appearance -- dry;intact;no drainage   -AB dry;intact;no drainage  -AB    Closure ELOINA  -AB ELOINA  -AB ELOINA  -AB    Base dressing in place, unable to visualize  -AB dressing in place, unable to visualize  -AB dressing in place, unable to visualize  -AB    Drainage Amount -- none  -AB --    Retired Wound - Properties Group Placement Date: 07/31/23  -LT Placement Time: 0818  -LT Side: Right  -LT Location: throat  -LT Primary Wound Type: Incision  -LT    Retired Wound - Properties Group Date first assessed: 07/31/23  -LT Time first assessed: 0818  -LT Side: Right  -LT Location: throat  -LT Primary Wound Type: Incision  -LT       Wound 08/02/23 0940 posterior cervical spine Incision    Wound - Properties Group Placement Date: 08/02/23  -TT Placement Time: 0940  -TT Present on Hospital Admission: N  -TT Orientation: posterior  -TT Location: cervical spine  -TT Primary Wound Type: Incision  -TT    Retired Wound - Properties Group Placement Date: 08/02/23  -TT Placement Time: 0940  -TT Present on Hospital Admission: N  -TT Orientation: posterior  -TT Location: cervical spine  -TT Primary Wound Type: Incision  -TT    Retired Wound - Properties Group Date first assessed: 08/02/23  -TT Time first assessed: 0940  -TT Present on Hospital Admission: N  -TT Location: cervical spine  -TT Primary Wound Type: Incision  -TT      Row Name 08/01/23 1600             Wound 07/31/23 0818 Right throat Incision    Wound - Properties Group Placement Date: 07/31/23  -LT Placement Time: 0818  -LT Side: Right  -LT Location: throat  -LT Primary Wound Type: Incision  -LT    Dressing Appearance dry;intact;no drainage  -DA      Closure Adhesive bandage;ELOINA  -DA      Base dressing in place, unable to visualize  -DA      Drainage Amount none  -DA      Dressing Care border dressing  -DA      Retired Wound - Properties Group Placement Date: 07/31/23  -LT Placement Time: 0818  -LT Side: Right  -LT Location: throat  -LT Primary Wound Type: Incision  -LT    Retired Wound - Properties Group Date  first assessed: 07/31/23  -LT Time first assessed: 0818  -LT Side: Right  -LT Location: throat  -LT Primary Wound Type: Incision  -LT              User Key  (r) = Recorded By, (t) = Taken By, (c) = Cosigned By      Initials Name Provider Type    TT Geeta Collins, RN Registered Nurse    Xiomara Khan, LPN Licensed Nurse    LT Evon Collins RN Registered Nurse    Odalys Herrera, NATTY Licensed Nurse                    CBC:      Lab 08/01/23  0555   WBC 7.60   HEMOGLOBIN 11.5*   HEMATOCRIT 35.2   PLATELETS 247   NEUTROS ABS 6.50   LYMPHS ABS 0.40*   MONOS ABS 0.70   EOS ABS 0.00   .5*       CMP:        Lab 08/01/23  0555   SODIUM 137   POTASSIUM 4.5   CHLORIDE 97*   CO2 30.0*   ANION GAP 10.0   BUN 9   CREATININE 0.54*   EGFR 96.1   GLUCOSE 165*   CALCIUM 8.8       No results found for: ACANTHNAEG, AFBCX, BPERTUSSISCX, BLOODCX  No results found for: BCIDPCR, CXREFLEX, CSFCX, CULTURETIS  No results found for: CULTURES, HSVCX, URCX  No results found for: EYECULTURE, GCCX, HSVCULTURE, LABHSV  No results found for: LEGIONELLA, MRSACX, MUMPSCX, MYCOPLASCX  No results found for: NOCARDIACX, STOOLCX  No results found for: THROATCX, UNSTIMCULT, URINECX, CULTURE, VZVCULTUR  No results found for: VIRALCULTU, WOUNDCX      Assessment & Plan      Brief Patient Summary:  Lori Sutton is a 73 y.o. female who       [MAR Hold] atorvastatin, 20 mg, Oral, Daily  [MAR Hold] budesonide-formoterol, 1 puff, Inhalation, BID - RT  [MAR Hold] citalopram, 20 mg, Oral, Daily  [MAR Hold] enoxaparin, 40 mg, Subcutaneous, Daily  famotidine, 20 mg, Oral, BID  hydrALAZINE, 50 mg, Oral, TID  [MAR Hold] valsartan, 320 mg, Oral, Q24H   And  [MAR Hold] hydroCHLOROthiazide, 25 mg, Oral, Q24H  [MAR Hold] levothyroxine, 25 mcg, Oral, Daily  metoprolol succinate XL, 100 mg, Oral, Daily  [MAR Hold] montelukast, 10 mg, Oral, Daily  [MAR Hold] pantoprazole, 40 mg, Oral, Q AM  [MAR Hold] sodium chloride, 10 mL, Intravenous, Q12H              Active Hospital Problems:  Active Hospital Problems    Diagnosis     **Cervical spondylosis with myelopathy     Idiopathic progressive neuropathy     Hypertension     Hyperlipidemia     Back pain     Depression     Gout     Irritable bowel syndrome     Peripheral venous insufficiency      73-year-old female that is s/p C4-C6 ACDF with C5 corpectomy with Dr. Mcgowan.  POD#2  Plan:    Continue present care  Per neurosurgery will undergo her stage II procedure today for posterior fixation.  continue to wear her c-collar at all times.  Pain management per neurosurgery  Continue mobilization efforts  Wound care   GI prophylaxis-Protonix  DVT Prophylaxis    Hyperlipidemia continue atorvastatin  Hypertension continue metoprolol, hydralazine   depression continue citalopram    DVT prophylaxis:  Medical and mechanical DVT prophylaxis orders are present.    CODE STATUS:         Disposition:  I expect patient to be discharged Referral to [facility], pending acceptance. No precert required. PASRR approved. .    I have utilized all available, immediate resources to obtain, update, or review the patient's current medications including all prescriptions, over-the-counter products, herbals, cannabis/cannabidiol products, and vitamin.mineral/dietary (nutritional) supplements.             Admission Status:  I believe this patient meets rehab status.    Hospital Medicine Quality Measures for Heart Failure:        This patient has been examined wearing appropriate Personal Protective Equipment and discussed with  rn . 08/02/23      Electronically signed by Ramy Ruiz MD, 08/02/23, 12:07 EDT.  [facility]ist [facility]

## 2023-08-02 NOTE — OP NOTE
Neurosurgical operative report:    Patient name: Lori Sutton  Medical record number: [id number]  Date of procedure: August 2, 2023    Preoperative diagnosis: C4-C5 severe spinal cord compression with resultant cervical myelopathy    Postoperative diagnosis: C4-C5 severe spinal cord compression with resultant cervical myelopathy    Procedure performed: C4-C6 posterior laminectomy, C4-C6 posterior lateral instrumented fixation and posterolateral arthrodesis, utilization of intraoperative neuro monitoring, utilization of biological autograft, utilization of biological allograft    Surgeon: Dr. Wilber Mcgowan  Assistant: Pina Devi  Anesthesia: General endotracheal anesthetic  Specimens: None  Blood loss: 250 cc  Drains: 7 flat epidural DAYANA  Complications: None immediate    Indications: This is a 73-year-old woman who underwent a cervical corpectomy and anterior fusion for cervical canal stenosis and spondylolisthesis 2 days ago.  She did well postoperatively and we elected to proceed with the planned stage II posterior decompression and fusion.    Description of procedure: The patient was identified in the preoperative holding area via name and medical record number.  Her history, physical exam, consent were all reviewed and found to be correct and appropriate for proceeding with surgery.  She was marked over the intended surgical site.  She was brought back to the operating room and handed over to anesthesia for induction of general endotracheal anesthetic.  Baseline motor and sensory neuro monitoring status was established.  She was pinned in the Whitten head byrne and she was transferred from the hospital bed to the operating room table in the prone position with the cervical collar in place.  She was secured to the bed in the proper orientation.  The cervical collar was removed.  Post flip neuro monitoring status had changed and therefore repositioning occurred.  We were able to reestablish signals in all  regions with some minor decrease.  We did elect to proceed with this reliable information.  The back of her neck and scalp was shaved of hair and cleaned with chlorhexidine and alcohol.  Incision was marked out based on anatomic and radiographic guidance.  Local anesthetic was instilled.  At this time the surgeon scrubbed in the area was prepped and draped in the usual sterile fashion.  Timeout was performed and all categories were found to be correct and appropriate for proceeding with surgery.  Incision was made with 15 blade and carried down with Bovie electrocautery.  Self-retaining retractor was brought into provide adequate visualization.  Fascia was opened up over the midline avascular plane.  The spinous processes of C3, C4, C5, C6 were exposed.  Subperiosteal dissection was accomplished along the spinous process and lamina from C4-C6 bilaterally.  Radiographic imaging confirmed proper levels.  Subperiosteal dissection was extended laterally to the lateral edge of the lateral mass bilaterally.  Self-retaining retractors were brought into provide adequate visualization.  Lateral mass screws were placed under direct visualization with anatomic guidance as well as x-ray guidance.  A bur hole was placed followed by hand-held drill traversing the lateral mass.  This pathway was probed to confirm absence of breach.  This pathway was then tapped to prep it for placement of the screw.  The left-sided C4 screw was 3.5 mm x 14 mm in the right sided C4 screw was 3.5 mm x 12 mm.  The left C5 screw was 3.5 mm x 18 mm and the right sided screw was 3.5 mm x 12 mm.  The right sided C6 screw was 3.5 mm x 12 mm.  There was a breach of the lateral mass and placement of the C6 screw on the left and it was elected to not be placed.  The wound was copiously irrigated and hemostasis was obtained.  C4-C6 bilateral laminectomy was accomplished with Leksell rongeur, power drill bit, Kerrison bone punch.  There was appreciable  improvement in motor and sensory neuro monitoring status.  The wound was copiously irrigated and hemostasis was obtained.  40 mm montana was placed in the right side set screw heads and the setscrew was torqued into place.  Left-sided 30 mm montana was placed into the screw heads and the setscrew was utilized to secure it in place.  This was also torqued.  The wound was copiously irrigated and hemostasis was obtained.  Posterior lateral decortication was accomplished bilaterally with power drill bit.  Biological allograft and autograft was packed into the posterior lateral gutters along the hardware for arthrodesis.  Vancomycin powder and a 7 flat epidural DAYANA drain was placed.  The self-retaining retractors were removed.  The muscle was brought together with 0 Vicryl stitches.  The fascia was closed with 0 Vicryl stitches.  The subcutaneous tissue was closed with interrupted 2-0 Vicryl stitches.  The skin was closed with a running V-Loc Monocryl and skin glue.  The DAYANA drain was secured in place and dressings were placed.  The drapes were taken down and the patient was transferred back to the hospital bed in the supine position.  Hard cervical collar was replaced immediately.  The Whitten pin head byrne was removed and the pin sites were interrogated for signs of complication which was not present.  She was extubated intraoperatively and she was transported to the postanesthesia care unit.  At the time of her being dropped off at that location no complications were evident.  At the end of the case all counts were found to be correct.  I was present and scrubbed for all key portions of the procedure and immediately available at all times.  The assistance of the surgical first assist was essential for successful completion of surgical intervention including assistance with opening, closing, suction, retraction.  End of dictation.  Thank you very much.

## 2023-08-02 NOTE — SIGNIFICANT NOTE
08/02/23 0757   OTHER   Discipline physical therapist   Rehab Time/Intention   Session Not Performed patient unavailable for evaluation;other (see comments)  (in sx at this time; will see later today if able)   Recommendation   PT - Next Appointment 08/03/23

## 2023-08-02 NOTE — PLAN OF CARE
Goal Outcome Evaluation:  Plan of Care Reviewed With: patient        Progress: improving  Outcome Evaluation: Patient s/p stage 1 cervical surgery, neuro check wdl, dressing monitored, hob maintained greater than or equal to 30 degrees per order, NPO after midnight for AM surgery.

## 2023-08-02 NOTE — CASE MANAGEMENT/SOCIAL WORK
Continued Stay Note  [facility]     Patient Name: Lori Sutton  MRN: [id number]  Today's Date: 8/2/2023    Admit Date: 7/31/2023    Plan: Accepted at [facility]. No precert  needed. [facility] is approved.   Discharge Plan       Row Name 08/02/23 1412       Plan    Plan Accepted at [facility]. No precert  needed. Pas[facility] is approved.    Patient/Family in Agreement with Plan yes    Plan Comments Barriers to dc: In surgery 8/2                             Expected Discharge Date and Time       Expected Discharge Date Expected Discharge Time    Aug 4, 2023               Shiloh Escobar RN

## 2023-08-02 NOTE — SIGNIFICANT NOTE
08/02/23 1148   OTHER   Discipline physical therapist   Rehab Time/Intention   Session Not Performed patient unavailable for evaluation;other (see comments)  (remains in sx at 11:50; will plan to see in AM)   Recommendation   PT - Next Appointment 08/03/23

## 2023-08-03 PROCEDURE — 97530 THERAPEUTIC ACTIVITIES: CPT

## 2023-08-03 PROCEDURE — 94799 UNLISTED PULMONARY SVC/PX: CPT

## 2023-08-03 PROCEDURE — 94664 DEMO&/EVAL PT USE INHALER: CPT

## 2023-08-03 PROCEDURE — 97162 PT EVAL MOD COMPLEX 30 MIN: CPT

## 2023-08-03 PROCEDURE — 94761 N-INVAS EAR/PLS OXIMETRY MLT: CPT

## 2023-08-03 PROCEDURE — 25010000002 ENOXAPARIN PER 10 MG

## 2023-08-03 PROCEDURE — 97116 GAIT TRAINING THERAPY: CPT

## 2023-08-03 PROCEDURE — 97535 SELF CARE MNGMENT TRAINING: CPT

## 2023-08-03 PROCEDURE — 99024 POSTOP FOLLOW-UP VISIT: CPT | Performed by: NURSE PRACTITIONER

## 2023-08-03 PROCEDURE — 25010000002 HYDROMORPHONE 1 MG/ML SOLUTION

## 2023-08-03 RX ORDER — AMOXICILLIN 250 MG
1 CAPSULE ORAL 2 TIMES DAILY
Status: DISCONTINUED | OUTPATIENT
Start: 2023-08-03 | End: 2023-08-04 | Stop reason: HOSPADM

## 2023-08-03 RX ORDER — METHOCARBAMOL 750 MG/1
750 TABLET, FILM COATED ORAL 3 TIMES DAILY
Status: DISCONTINUED | OUTPATIENT
Start: 2023-08-03 | End: 2023-08-04 | Stop reason: HOSPADM

## 2023-08-03 RX ADMIN — Medication 5 MG: at 20:55

## 2023-08-03 RX ADMIN — ENOXAPARIN SODIUM 40 MG: 100 INJECTION SUBCUTANEOUS at 16:16

## 2023-08-03 RX ADMIN — CITALOPRAM HYDROBROMIDE 20 MG: 20 TABLET ORAL at 08:50

## 2023-08-03 RX ADMIN — MONTELUKAST 10 MG: 10 TABLET, FILM COATED ORAL at 08:49

## 2023-08-03 RX ADMIN — BUDESONIDE AND FORMOTEROL FUMARATE DIHYDRATE 1 PUFF: 160; 4.5 AEROSOL RESPIRATORY (INHALATION) at 07:06

## 2023-08-03 RX ADMIN — METHOCARBAMOL 750 MG: 750 TABLET ORAL at 16:16

## 2023-08-03 RX ADMIN — HYDRALAZINE HYDROCHLORIDE 50 MG: 25 TABLET, FILM COATED ORAL at 16:16

## 2023-08-03 RX ADMIN — HYDROCODONE BITARTRATE AND ACETAMINOPHEN 1 TABLET: 5; 325 TABLET ORAL at 16:16

## 2023-08-03 RX ADMIN — Medication 10 ML: at 08:50

## 2023-08-03 RX ADMIN — HYDROCODONE BITARTRATE AND ACETAMINOPHEN 1 TABLET: 5; 325 TABLET ORAL at 10:49

## 2023-08-03 RX ADMIN — MAGNESIUM HYDROXIDE 10 ML: 2400 SUSPENSION ORAL at 09:07

## 2023-08-03 RX ADMIN — HYDROCODONE BITARTRATE AND ACETAMINOPHEN 1 TABLET: 5; 325 TABLET ORAL at 20:55

## 2023-08-03 RX ADMIN — Medication 10 ML: at 20:55

## 2023-08-03 RX ADMIN — HYDROCHLOROTHIAZIDE 25 MG: 25 TABLET ORAL at 08:49

## 2023-08-03 RX ADMIN — SENNOSIDES AND DOCUSATE SODIUM 1 TABLET: 50; 8.6 TABLET ORAL at 13:17

## 2023-08-03 RX ADMIN — PANTOPRAZOLE SODIUM 40 MG: 40 TABLET, DELAYED RELEASE ORAL at 06:09

## 2023-08-03 RX ADMIN — LEVOTHYROXINE SODIUM 25 MCG: 0.03 TABLET ORAL at 08:49

## 2023-08-03 RX ADMIN — METHOCARBAMOL 750 MG: 750 TABLET ORAL at 20:55

## 2023-08-03 RX ADMIN — FAMOTIDINE 20 MG: 20 TABLET, FILM COATED ORAL at 20:55

## 2023-08-03 RX ADMIN — METOPROLOL SUCCINATE 100 MG: 50 TABLET, EXTENDED RELEASE ORAL at 08:52

## 2023-08-03 RX ADMIN — BUDESONIDE AND FORMOTEROL FUMARATE DIHYDRATE 1 PUFF: 160; 4.5 AEROSOL RESPIRATORY (INHALATION) at 21:07

## 2023-08-03 RX ADMIN — METHOCARBAMOL 750 MG: 750 TABLET ORAL at 10:49

## 2023-08-03 RX ADMIN — HYDROCODONE BITARTRATE AND ACETAMINOPHEN 1 TABLET: 5; 325 TABLET ORAL at 00:29

## 2023-08-03 RX ADMIN — HYDROCODONE BITARTRATE AND ACETAMINOPHEN 1 TABLET: 5; 325 TABLET ORAL at 07:04

## 2023-08-03 RX ADMIN — ATORVASTATIN CALCIUM 20 MG: 20 TABLET, FILM COATED ORAL at 08:50

## 2023-08-03 RX ADMIN — HYDRALAZINE HYDROCHLORIDE 50 MG: 25 TABLET, FILM COATED ORAL at 08:52

## 2023-08-03 RX ADMIN — HYDROMORPHONE HYDROCHLORIDE 0.5 MG: 1 INJECTION, SOLUTION INTRAMUSCULAR; INTRAVENOUS; SUBCUTANEOUS at 03:45

## 2023-08-03 RX ADMIN — FAMOTIDINE 20 MG: 20 TABLET, FILM COATED ORAL at 08:49

## 2023-08-03 RX ADMIN — HYDRALAZINE HYDROCHLORIDE 50 MG: 25 TABLET, FILM COATED ORAL at 20:55

## 2023-08-03 RX ADMIN — SENNOSIDES AND DOCUSATE SODIUM 1 TABLET: 50; 8.6 TABLET ORAL at 20:55

## 2023-08-03 NOTE — PROGRESS NOTES
Neurosurgery Progress Note      Patient: Lori Sutton    YOB: 1950    Medical Record Number: [id number]    Attending Physician: Ramy Ruiz MD    Date of Admission: 7/31/2023  5:22 AM    Status Post: DAY 1 -CERVICAL CORPECTOMY  LEVEL CERVICAL 5 WITH FUSION LEVELS CERVICAL 4-6 ANTERIOR; DAY 2, C4-C6 laminectomy and posterior lateral fusion with instrumentation    Post Operative Day Number: 3/1    Admitting Dx: Cervical spondylosis with myelopathy [M47.12]    General Appearance:  73 y.o. female awake and alert with c-collar on sitting up in chair.  She is moving all extremities.  She is experiencing a tad more post surgical soreness posteriorly into her shoulders.  Surgical drain in place with serosanguineous drainage she reports no acute neurologic complaints.  She is swallowing okay.    Current Problem List:     Cervical spondylosis with myelopathy    Hypertension    Hyperlipidemia    Back pain    Depression    Gout    Irritable bowel syndrome    Peripheral venous insufficiency    Idiopathic progressive neuropathy          Current Medications:  Scheduled Meds:atorvastatin, 20 mg, Oral, Daily  budesonide-formoterol, 1 puff, Inhalation, BID - RT  citalopram, 20 mg, Oral, Daily  enoxaparin, 40 mg, Subcutaneous, Daily  famotidine, 20 mg, Oral, BID  hydrALAZINE, 50 mg, Oral, TID  valsartan, 320 mg, Oral, Q24H   And  hydroCHLOROthiazide, 25 mg, Oral, Q24H  levothyroxine, 25 mcg, Oral, Daily  metoprolol succinate XL, 100 mg, Oral, Daily  montelukast, 10 mg, Oral, Daily  pantoprazole, 40 mg, Oral, Q AM  sodium chloride, 10 mL, Intravenous, Q12H      Continuous Infusions:   PRN Meds:.  acetaminophen    albuterol sulfate HFA    bisacodyl    docusate sodium    HYDROcodone-acetaminophen    HYDROmorphone **AND** naloxone    hydrOXYzine    ipratropium-albuterol    magnesium hydroxide    melatonin    methocarbamol    ondansetron    sodium chloride    sodium chloride      Diagnostic Tests:    Lab Results  (last 24 hours)       ** No results found for the last 24 hours. **            Imaging Results (Last 24 Hours)       Procedure Component Value Units Date/Time    XR Spine Cervical 2 or 3 View [[id number]] Collected: 08/02/23 2109     Updated: 08/02/23 2115    Narrative:      XR SPINE CERVICAL 2 OR 3 VW    Date of Exam: 8/2/2023 7:43 PM EDT    Indication: standing xray - s/p cervical fusion    Comparison: CT cervical spine 7/31/2023    Findings:  Postoperative changes are noted status post additional posterior fusion at the C4-C6 spinal levels. Postoperative changes related to the prior anterior fusion are again noted at the C4-C6 spinal levels as well. Anatomic alignment is noted. Surgical   changes are seen in the overlying posterior soft tissues. A surgical drain is observed.      Impression:      Impression:  1.Postoperative changes status post additional posterior fusion at the C4-C6 spinal levels. There is anatomic alignment of the cervical spine. Surgical changes are seen in the overlying posterior soft tissues.      Electronically Signed: Narciso Baez    8/2/2023 9:13 PM EDT    Workstation ID: [id number]    FL C Arm During Surgery [[id number]] Resulted: 08/02/23 1344     Updated: 08/02/23 1344    Narrative:      This procedure was auto-finalized with no dictation required.    XR Spine Cervical 2 or 3 View [[id number]] Resulted: 08/02/23 1333     Updated: 08/02/23 1338            Physical Exam:   General Appearance:  Alert, cooperative, in no acute distress   Head:  Normocephalic, without obvious abnormality, atraumatic   Neck:   Anterior cervical incision is well approximated, trachea is midline, scattered ecchymosis.  Posterior cervical dressing clean dry and intact.  Posterior cervical drain with minimal serosanguineous drainage.   Abdomen:   nontender, nondistended   Extremities/MSK: Moves all extremities well, no edema, no cyanosis, no             Redness    Skin: No bleeding, bruising or rash   Neurologic:  Cranial nerves 2 - 12 grossly intact         Vitals:    08/03/23 0100 08/03/23 0500 08/03/23 0706 08/03/23 0709   BP: 157/79 175/67     BP Location: Right arm Right arm     Patient Position: Lying Lying     Pulse: 80 89 92 94   Resp: 16 16 16 18   Temp: 98.8 øF (37.1 øC) 98.4 øF (36.9 øC)     TempSrc: Oral Oral     SpO2: 96% 93% 94%    Weight:       Height:         Posterior cervical DAYANA drain: 110 mL since midnight.    Assessment: Patient is a 73-year-old female that underwent anterior and posterior cervical decompression and fusion with Dr. Mcgowan.  She is postop day 3 and 1 respectively.  She is having a little bit more postoperative soreness posteriorly which is expected.  She is neurologically stable, no reported delayed C5 palsy.  Surgical drain with fairly moderate amount of drainage and will remain today.  Patient should work diligently with physical therapy in hopes of possible discharge in a day or 2 to likely rehab once drain can be removed and standing x-rays have been completed and reviewed for stability of hardware placement.    plan:    Continue c-collar at all times  Continue drain  Schedule muscle relaxers  Postop x-rays pending  Continue Pain management efforts  Continue mobilization efforts  Continue incisional care  Continue DVT Prophylaxis    Discharge Plan: Likely rehab    I discussed presentation with patient, nursing staff and Dr. Mcgowan.    Date: 8/3/2023    BEATRICE Nelson  08:25 EDT

## 2023-08-03 NOTE — PROGRESS NOTES
[facility] Services Daily Progress Note    Patient Name: Lori Sutton  : 1950  MRN: [id number]  Primary Care Physician:  Ayaz Luna MD  Date of admission: 2023      Subjective      Chief Complaint: Neck pain     73-year-old woman with a BMI of 33 as well as significant lung disease who has had a prior thyroidectomy and was a chronic everyday smoker at 2 packs/day for greater than 40 years.  She comments that approximately 20 years ago she was offered neck surgery for which she turned down.  She has had chronic conservative therapy including physical therapy multiple times.  She has had greater than 5 years of progressive numbness in her hands and feet.  She has progressive worsening of her dexterity as well as difficulty signing her name, buttoning a button as well as picking up small items off of a table.  She drops items regularly.  She has significant balance issues as well as proprioceptive issues that are worsened on uneven ground as well as in the dark.  I last evaluated her on 2023.  When I initially evaluated her in February she had a possible C5 radiculopathy but that distribution the pain had significantly improved by the time I saw her in April.  MI April appointment I recommended surgical decompression but she would need significant clearance including pulmonary clearance.  She has seen her pulmonologist and undergone pulmonary function testing and has been cleared.  She comments that her myelopathic symptoms continue to slowly progress.       2023 patient seen and examined in bed no acute distress, vital signs stable, she is complaining of itching around her neck.  Says her neck brace makes it worse.She is postop day 1.  She is moving all extremities.  Her pain is well controlled.  Her incision looks appropriate.  She is eating with no swallowing difficulties.  She will undergo her stage II procedure tomorrow for posterior fixation.     8/2/2023 patient seen in bed no acute distress, vital signs stable, for surgery today.  Discussed with RN.  8/3/23 patient seen and examined in bed no acute distress, vital signs stable, discussed with RN.  Patient with constipation bowel regimen ordered.  Awaiting placement.    ROS HENT: Negative.     Eyes: Negative.    Respiratory: Negative.     Cardiovascular: Negative.    Gastrointestinal: Negative.    Endocrine: Negative.    Genitourinary: Negative.    Musculoskeletal:  Positive for arthralgias.   Skin: Negative.    Allergic/Immunologic: Negative.    Neurological:  Positive for numbness (tingling/feet).   Hematological: Negative.    Psychiatric/Behavioral: Negative.          Objective      Vitals:   Temp:  [97.2 øF (36.2 øC)-98.8 øF (37.1 øC)] 98.1 øF (36.7 øC)  Heart Rate:  [] 80  Resp:  [8-18] 16  BP: (120-175)/(60-85) 164/75  Flow (L/min):  [0-9] 2    Physical Exam  Vitals and nursing note reviewed.   Constitutional:       General: She is not in acute distress.     Appearance: Normal appearance. She is well-developed. She is not ill-appearing, toxic-appearing or diaphoretic.   HENT:      Head: Normocephalic and atraumatic.      Nose: Nose normal. No congestion or rhinorrhea.      Mouth/Throat:      Mouth: Mucous membranes are moist.      Pharynx: No oropharyngeal exudate.   Eyes:      General: No scleral icterus.        Right eye: No discharge.         Left eye: No discharge.      Extraocular Movements: Extraocular movements intact.      Conjunctiva/sclera: Conjunctivae normal.      Pupils: Pupils are equal, round, and reactive to light.   Neck:      Thyroid: No thyromegaly.      Vascular: No carotid bruit or JVD.      Trachea: No tracheal deviation.      Comments: Neck brace in place  Cardiovascular:      Rate and Rhythm: Normal rate and regular rhythm.      Pulses: Normal pulses.      Heart sounds: Normal heart sounds. No murmur heard.    No friction rub. No gallop.   Pulmonary:      Effort:  Pulmonary effort is normal. No respiratory distress.      Breath sounds: Normal breath sounds. No stridor. No wheezing, rhonchi or rales.   Chest:      Chest wall: No tenderness.   Abdominal:      General: Bowel sounds are normal. There is no distension.      Palpations: Abdomen is soft. There is no mass.      Tenderness: There is no abdominal tenderness. There is no guarding or rebound.      Hernia: No hernia is present.   Musculoskeletal:         General: No swelling, tenderness, deformity or signs of injury. Normal range of motion.      Cervical back: No rigidity. No muscular tenderness.      Right lower leg: No edema.      Left lower leg: No edema.   Lymphadenopathy:      Cervical: No cervical adenopathy.   Skin:     General: Skin is warm and dry.      Coloration: Skin is not jaundiced or pale.      Findings: Erythema present. No bruising or rash.      Comments: Erythema around the upper part of the neck.   Neurological:      General: No focal deficit present.      Mental Status: She is alert and oriented to person, place, and time. Mental status is at baseline.      Cranial Nerves: No cranial nerve deficit.      Sensory: No sensory deficit.      Motor: No weakness or abnormal muscle tone.      Coordination: Coordination normal.   Psychiatric:         Mood and Affect: Mood normal.         Behavior: Behavior normal.         Thought Content: Thought content normal.         Judgment: Judgment normal.           Result Review    Result Review:  I have personally reviewed the results from the time of this admission to 8/3/2023 12:37 EDT and agree with these findings:  []  Laboratory  []  Microbiology  []  Radiology  []  EKG/Telemetry   []  Cardiology/Vascular   []  Pathology  []  Old records  []  Other:  Most notable findings include:     Wounds (last 24 hours)       LDA Wound       Row Name 08/03/23 0807 08/02/23 2022 08/02/23 1500       Wound 07/31/23 0818 Right throat Incision    Wound - Properties Group Placement  Date: 07/31/23  -LT Placement Time: 0818 -LT Side: Right  -LT Location: throat  -LT Primary Wound Type: Incision  -LT    Dressing Appearance dry;intact  -CS intact;dry  -NGUYỄN intact;dry;no drainage  -DA    Closure ELOINA  -CS ELOINA  -NGUYỄN ELOINA  -DA    Base dressing in place, unable to visualize  -CS dressing in place, unable to visualize  -NGUYỄN dressing in place, unable to visualize  -DA    Drainage Amount none  -CS none  -GNUYỄN none  -DA    Dressing Care -- -- border dressing  -DA    Retired Wound - Properties Group Placement Date: 07/31/23  -LT Placement Time: 0818 -LT Side: Right  -LT Location: throat  -LT Primary Wound Type: Incision  -LT    Retired Wound - Properties Group Date first assessed: 07/31/23  -LT Time first assessed: 0818 -LT Side: Right  -LT Location: throat  -LT Primary Wound Type: Incision  -LT       Wound 08/02/23 0940 posterior cervical spine Incision    Wound - Properties Group Placement Date: 08/02/23  -TT Placement Time: 0940  -TT Present on Hospital Admission: N  -TT Orientation: posterior  -TT Location: cervical spine  -TT Primary Wound Type: Incision  -TT    Dressing Appearance dry;intact  -CS intact;dry  -NGUYỄN intact;dry;no drainage  -KZ    Closure ELOINA  -CS Adhesive bandage;ELOINA  -NGUYỄN Adhesive bandage;ELOINA  -KZ    Base dressing in place, unable to visualize  -CS dressing in place, unable to visualize  -NGUYỄN dressing in place, unable to visualize  -KZ    Drainage Amount none  -CS -- --    Dressing Care -- -- border dressing  -DA    Retired Wound - Properties Group Placement Date: 08/02/23  -TT Placement Time: 0940  -TT Present on Hospital Admission: N  -TT Orientation: posterior  -TT Location: cervical spine  -TT Primary Wound Type: Incision  -TT    Retired Wound - Properties Group Date first assessed: 08/02/23  -TT Time first assessed: 0940  -TT Present on Hospital Admission: N  -TT Location: cervical spine  -TT Primary Wound Type: Incision  -TT      Row Name 08/02/23 1445 08/02/23 1430 08/02/23 1415        Wound 07/31/23 0818 Right throat Incision    Wound - Properties Group Placement Date: 07/31/23  -LT Placement Time: 0818  -LT Side: Right  -LT Location: throat  -LT Primary Wound Type: Incision  -LT    Retired Wound - Properties Group Placement Date: 07/31/23  -LT Placement Time: 0818  -LT Side: Right  -LT Location: throat  -LT Primary Wound Type: Incision  -LT    Retired Wound - Properties Group Date first assessed: 07/31/23  -LT Time first assessed: 0818  -LT Side: Right  -LT Location: throat  -LT Primary Wound Type: Incision  -LT       Wound 08/02/23 0940 posterior cervical spine Incision    Wound - Properties Group Placement Date: 08/02/23  -TT Placement Time: 0940  -TT Present on Hospital Admission: N  -TT Orientation: posterior  -TT Location: cervical spine  -TT Primary Wound Type: Incision  -TT    Closure Adhesive bandage;ELOINA  -KZ Adhesive bandage;ELOINA  -KZ Adhesive bandage;ELOINA  -KZ    Base dressing in place, unable to visualize  -KZ dressing in place, unable to visualize  -KZ dressing in place, unable to visualize  -KZ    Retired Wound - Properties Group Placement Date: 08/02/23  -TT Placement Time: 0940  -TT Present on Hospital Admission: N  -TT Orientation: posterior  -TT Location: cervical spine  -TT Primary Wound Type: Incision  -TT    Retired Wound - Properties Group Date first assessed: 08/02/23  -TT Time first assessed: 0940  -TT Present on Hospital Admission: N  -TT Location: cervical spine  -TT Primary Wound Type: Incision  -TT      Row Name 08/02/23 1400 08/02/23 1347 08/02/23 1343       Wound 07/31/23 0818 Right throat Incision    Wound - Properties Group Placement Date: 07/31/23  -LT Placement Time: 0818  -LT Side: Right  -LT Location: throat  -LT Primary Wound Type: Incision  -LT    Retired Wound - Properties Group Placement Date: 07/31/23  -LT Placement Time: 0818  -LT Side: Right  -LT Location: throat  -LT Primary Wound Type: Incision  -LT    Retired Wound - Properties Group Date first  assessed: 07/31/23  -LT Time first assessed: 0818  -LT Side: Right  -LT Location: throat  -LT Primary Wound Type: Incision  -LT       Wound 08/02/23 0940 posterior cervical spine Incision    Wound - Properties Group Placement Date: 08/02/23  -TT Placement Time: 0940  -TT Present on Hospital Admission: N  -TT Orientation: posterior  -TT Location: cervical spine  -TT Primary Wound Type: Incision  -TT    Dressing Appearance -- dry;intact;no drainage  -KZ --    Closure Adhesive bandage;ELOINA  -KZ Adhesive bandage;ELOINA  -KZ --    Base dressing in place, unable to visualize  -KZ dressing in place, unable to visualize  -KZ --    Dressing Care -- -- dressing applied  exofin, covaderm, c-collar  -TT    Retired Wound - Properties Group Placement Date: 08/02/23  -TT Placement Time: 0940  -TT Present on Hospital Admission: N  -TT Orientation: posterior  -TT Location: cervical spine  -TT Primary Wound Type: Incision  -TT    Retired Wound - Properties Group Date first assessed: 08/02/23  -TT Time first assessed: 0940  -TT Present on Hospital Admission: N  -TT Location: cervical spine  -TT Primary Wound Type: Incision  -TT      Row Name 08/02/23 1307             Wound 07/31/23 0818 Right throat Incision    Wound - Properties Group Placement Date: 07/31/23  -LT Placement Time: 0818  -LT Side: Right  -LT Location: throat  -LT Primary Wound Type: Incision  -LT    Closure Liquid skin adhesive;Adhesive bandage  exofin, covaderm on incision. covaderm around drain. c-collar  -LT      Retired Wound - Properties Group Placement Date: 07/31/23  -LT Placement Time: 0818  -LT Side: Right  -LT Location: throat  -LT Primary Wound Type: Incision  -LT    Retired Wound - Properties Group Date first assessed: 07/31/23  -LT Time first assessed: 0818  -LT Side: Right  -LT Location: throat  -LT Primary Wound Type: Incision  -LT       Wound 08/02/23 0940 posterior cervical spine Incision    Wound - Properties Group Placement Date: 08/02/23  -TT Placement  Time: 0940  -TT Present on Hospital Admission: N  -TT Orientation: posterior  -TT Location: cervical spine  -TT Primary Wound Type: Incision  -TT    Retired Wound - Properties Group Placement Date: 08/02/23  -TT Placement Time: 0940  -TT Present on Hospital Admission: N  -TT Orientation: posterior  -TT Location: cervical spine  -TT Primary Wound Type: Incision  -TT    Retired Wound - Properties Group Date first assessed: 08/02/23  -TT Time first assessed: 0940  -TT Present on Hospital Admission: N  -TT Location: cervical spine  -TT Primary Wound Type: Incision  -TT              User Key  (r) = Recorded By, (t) = Taken By, (c) = Cosigned By      Initials Name Provider Type    Geeta Garrido RN Registered Nurse    Evon Saha RN Registered Nurse    Radha Figueredo RN Registered Nurse    Nohelia Clark RN Registered Nurse    Odalys Herrera, LPN Licensed Nurse    Livier Esquivel LPN Licensed Nurse                    CBC:      Lab 08/01/23  0555   WBC 7.60   HEMOGLOBIN 11.5*   HEMATOCRIT 35.2   PLATELETS 247   NEUTROS ABS 6.50   LYMPHS ABS 0.40*   MONOS ABS 0.70   EOS ABS 0.00   .5*     CMP:        Lab 08/01/23  0555   SODIUM 137   POTASSIUM 4.5   CHLORIDE 97*   CO2 30.0*   ANION GAP 10.0   BUN 9   CREATININE 0.54*   EGFR 96.1   GLUCOSE 165*   CALCIUM 8.8     No results found for: ACANTHNAEG, AFBCX, BPERTUSSISCX, BLOODCX  No results found for: BCIDPCR, CXREFLEX, CSFCX, CULTURETIS  No results found for: CULTURES, HSVCX, URCX  No results found for: EYECULTURE, GCCX, HSVCULTURE, LABHSV  No results found for: LEGIONELLA, MRSACX, MUMPSCX, MYCOPLASCX  No results found for: NOCARDIACX, STOOLCX  No results found for: THROATCX, UNSTIMCULT, URINECX, CULTURE, VZVCULTUR  No results found for: VIRALCULTU, WOUNDCX      Assessment & Plan      Brief Patient Summary:  Lori Sutton is a 73 y.o. female who       atorvastatin, 20 mg, Oral, Daily  budesonide-formoterol, 1 puff, Inhalation, BID -  RT  citalopram, 20 mg, Oral, Daily  enoxaparin, 40 mg, Subcutaneous, Daily  famotidine, 20 mg, Oral, BID  hydrALAZINE, 50 mg, Oral, TID  valsartan, 320 mg, Oral, Q24H   And  hydroCHLOROthiazide, 25 mg, Oral, Q24H  levothyroxine, 25 mcg, Oral, Daily  methocarbamol, 750 mg, Oral, TID  metoprolol succinate XL, 100 mg, Oral, Daily  montelukast, 10 mg, Oral, Daily  pantoprazole, 40 mg, Oral, Q AM  senna-docusate sodium, 1 tablet, Oral, BID  sodium chloride, 10 mL, Intravenous, Q12H             Active Hospital Problems:  Active Hospital Problems    Diagnosis     **Cervical spondylosis with myelopathy     Idiopathic progressive neuropathy     Hypertension     Hyperlipidemia     Back pain     Depression     Gout     Irritable bowel syndrome     Peripheral venous insufficiency      73-year-old female that is s/p C4-C6 ACDF with C5 corpectomy with Dr. Mcgowan.  POD#2  Plan:    Continue present care  Per neurosurgery will undergo her stage II procedure today for posterior fixation.  continue to wear her c-collar at all times.  Pain management per neurosurgery  Continue mobilization efforts  Wound care   GI prophylaxis-Protonix  DVT Prophylaxis    Hyperlipidemia continue atorvastatin  Hypertension continue metoprolol, hydralazine   depression continue citalopram    DVT prophylaxis:  Medical and mechanical DVT prophylaxis orders are present.    CODE STATUS:         Disposition:  I expect patient to be discharged Referral to [facility], pending acceptance. No precert required. PASRR approved. .    I have utilized all available, immediate resources to obtain, update, or review the patient's current medications including all prescriptions, over-the-counter products, herbals, cannabis/cannabidiol products, and vitamin.mineral/dietary (nutritional) supplements.         Admission Status:  I believe this patient meets rehab status.    Hospital Medicine Quality Measures for Heart Failure:      This patient has been examined wearing appropriate  Personal Protective Equipment and discussed with  rn . 08/03/23      Electronically signed by Ramy Ruiz MD, 08/03/23, 12:37 EDT.  [facility] Hospitalist Team

## 2023-08-03 NOTE — PLAN OF CARE
Goal Outcome Evaluation:               Patient up in chair today. C- collar in place. Pain control with medication.Oxygen required at this time to keep sats above 90%

## 2023-08-03 NOTE — THERAPY TREATMENT NOTE
Subjective: Pt agreeable to therapeutic plan of care.  Cognition: oriented to Person, Place, Time, and Situation    Objective:     Bed Mobility: Min-A   Functional Transfers: Min-A     Balance: static and standing CGA  Functional Ambulation: CGA    Toileting: SBA  ADL Position: supported sitting and supported standing      Grooming: Min-A  ADL Position: edge of bed sitting  ADL Comments: grooming and washing of hair completed EOB w/ min A to complete.       Vitals: Desaturates    Pain: 6 VAS  Location: surgical  Interventions for pain: Repositioned  Education: Provided education on the importance of mobility in the acute care setting      Assessment: Lori Sutton presents with ADL impairments affecting function including balance, endurance / activity tolerance, pain, and strength. Demonstrated functioning below baseline abilities indicate the need for continued skilled intervention while inpatient. Tolerating session today without incident. Will continue to follow and progress as tolerated.     Plan/Recommendations:   High Intensity Therapy recommended post-acute care. This is recommended as therapy feels the patient would require 5-6 days per week, 2-3 hours per day. At this time, inpatient rehabilitation (acute rehab) would be the first choice and SNF would be second.. Pt requires no DME at discharge.     Pt desires Inpatient Rehabilitation placement at discharge. Pt cooperative; agreeable to therapeutic recommendations and plan of care.     Modified [place]: N/A = No pre-op stroke/TIA    Post-Tx Position: Up in Chair  PPE: gloves

## 2023-08-03 NOTE — PLAN OF CARE
Goal Outcome Evaluation:      Pt is aox4, VSS, and able to ambulate with an assist x 1. C-collar is on. HOB elevated above 30 degrees. Accepted into [facility]. FC currently in place and with DC in the morning. Plan of care ongoing.

## 2023-08-03 NOTE — PLAN OF CARE
Goal Outcome Evaluation:  Plan of Care Reviewed With: (P) patient           Outcome Evaluation: (P) 73 y.o. female adm to [facility] on 7/31 s/p anterior fusion of C4-C6 and s/p posterior fusion of C4-C6 on 8/2. At baseline pt lives alone in a [facility] w/ 0 RUFINA. She is indep w/ all ADLs, household and community mobility, and driving. Today pt was A&Ox4. Pt was up in chair upon PT arrival wearing her C-collar. Pt reported she required some assist w/ getting out of bed this morning prior to getting to the chair. She was CGA w/ STS w/ max verbal and tactile cues to places hands on arm rest and push off chair to stand while performing STS transfer. Pt ambulated 40 w/ rw and CGA. She demonstrated decreased vilma, short stride lengths, narrow DYLON and frequent path deviations requiring PT to assist w/ navigating rw during ambulation. Pt would benefit from skilled PT to address deficits. Recommend IRF at d/c. PT will follow.      Anticipated Discharge Disposition (PT): (P) inpatient rehabilitation facility

## 2023-08-03 NOTE — THERAPY EVALUATION
Patient Name: Lori Sutton  : 1950    MRN: [id number]                              Today's Date: 8/3/2023       Admit Date: 2023    Visit Dx:     ICD-10-CM ICD-9-CM   1. Cervical spondylosis with myelopathy  M47.12 721.1     Patient Active Problem List   Diagnosis    Medicare annual wellness visit, subsequent    Hypertension    Hyperlipidemia    Fibrocystic breast    Back pain    Depression    Edema    Gout    Irritable bowel syndrome    Lymphedema of lower extremity    Peripheral venous insufficiency    Polyosteoarthritis    Reactive airways dysfunction syndrome    Cough    Idiopathic progressive neuropathy    Cervical spondylosis with myelopathy    Reactive airway disease with acute exacerbation     Past Medical History:   Diagnosis Date    Anxiety     Arthritis     Asthma     Constipation     COPD (chronic obstructive pulmonary disease)     DDD (degenerative disc disease), cervical     DDD (degenerative disc disease), lumbar     Disease of thyroid gland     Emphysema lung     Fibrocystic breast     Hard of hearing     wears hearing aids bilat    Hyperlipidemia     Hypertension     Lymphedema of both lower extremities     red and irritable    PONV (postoperative nausea and vomiting)     Seasonal allergies     Urinary incontinence      Past Surgical History:   Procedure Laterality Date    APPENDECTOMY      AUGMENTATION MAMMAPLASTY      BREAST CYST ASPIRATION      BREAST CYST EXCISION      CERVICAL FUSION Right 2023    Procedure: DAY 1 -CERVICAL CORPECTOMY  LEVEL CERVICAL 5 WITH FUSION LEVELS CERVICAL 4-6 ANTERIOR;  Surgeon: Wilber Mcgowna MD;  Location: [facility] MAIN OR;  Service: Neurosurgery;  Laterality: Right;    HYSTERECTOMY      THYROIDECTOMY, PARTIAL      TONSILLECTOMY        General Information       Row Name 23 1114          Physical Therapy Time and Intention    Document Type evaluation (P)   -TB     Mode of Treatment physical therapy (P)   -TB       Row Name 23 1114           General Information    Patient Profile Reviewed yes (P)   -TB     Prior Level of Function independent:;community mobility;all household mobility;gait;bed mobility;ADL's;transfer;driving (P)   -TB     Existing Precautions/Restrictions cervical collar;oxygen therapy device and L/min;brace on at all times (P)   2L  -TB     Barriers to Rehab none identified (P)   -TB       Row Name 08/03/23 1114          Living Environment    People in Home alone (P)   -TB       Row Name 08/03/23 1114          Home Main Entrance    Number of Stairs, Main Entrance none (P)   steep driveway  -TB       Row Name 08/03/23 1114          Stairs Within Home, Primary    Number of Stairs, Within Home, Primary none (P)   -TB       Row Name 08/03/23 1114          Cognition    Orientation Status (Cognition) oriented x 4 (P)   -TB       Row Name 08/03/23 1114          Safety Issues, Functional Mobility    Impairments Affecting Function (Mobility) balance;strength;shortness of breath;sensation/sensory awareness;range of motion (ROM);endurance/activity tolerance;pain;coordination;postural/trunk control (P)   -TB               User Key  (r) = Recorded By, (t) = Taken By, (c) = Cosigned By      Initials Name Provider Type    TB Carmen Warner, PT Student PT Student                   Mobility       Row Name 08/03/23 1120          Bed Mobility    Bed Mobility other (see comments) (P)   -TB     Comment, (Bed Mobility) Pt up in chair. Per pt she required some assist to get out of bed. (P)   -TB       Row Name 08/03/23 1120          Sit-Stand Transfer    Sit-Stand [place] (Transfers) contact guard (P)   -TB     Assistive Device (Sit-Stand Transfers) walker, front-wheeled (P)   -TB     Comment, (Sit-Stand Transfer) max verbal and tactile cues given for safe hand placement when performing transfers (P)   -TB       Row Name 08/03/23 1120          Gait/Stairs (Locomotion)    [place] Level (Gait) contact guard (P)   -TB     Assistive Device  (Gait) walker, front-wheeled (P)   -TB     Distance in Feet (Gait) 40 ft (P)   -TB     Deviations/Abnormal Patterns (Gait) base of support, narrow;vilma decreased;stride length decreased (P)   -TB     Bilateral Gait Deviations heel strike decreased (P)   lack of push-off  -TB               User Key  (r) = Recorded By, (t) = Taken By, (c) = Cosigned By      Initials Name Provider Type    TB Carmen Warner, PT Student PT Student                   Obj/Interventions       Row Name 08/03/23 1123          Range of Motion Comprehensive    General Range of Motion bilateral lower extremity ROM WFL (P)   -TB     Comment, General Range of Motion defer BUE to OT; (P)   -TB       Row Name 08/03/23 1123          Strength Comprehensive (MMT)    General Manual Muscle Testing (MMT) Assessment no strength deficits identified (P)   -TB     Comment, General Manual Muscle Testing (MMT) Assessment defer BUE to OT (P)   -TB       Row Name 08/03/23 1123          Balance    Balance Assessment sitting static balance;sitting dynamic balance;standing static balance;standing dynamic balance (P)   -TB     Static Sitting Balance standby assist;modified independence (P)   -TB     Dynamic Sitting Balance standby assist (P)   -TB     Position, Sitting Balance supported;sitting in chair (P)   -TB     Static Standing Balance contact guard (P)   -TB     Dynamic Standing Balance contact guard (P)   -TB     Position/Device Used, Standing Balance walker, front-wheeled;supported (P)   -TB               User Key  (r) = Recorded By, (t) = Taken By, (c) = Cosigned By      Initials Name Provider Type    TB Carmen Warner, PT Student PT Student                   Goals/Plan       Row Name 08/03/23 1249          Bed Mobility Goal 1 (PT)    Activity/Assistive Device (Bed Mobility Goal 1, PT) bed mobility activities, all (P)   -TB     [place] Level/Cues Needed (Bed Mobility Goal 1, PT) modified independence (P)   -TB     Time Frame (Bed Mobility Goal 1,  PT) 2 weeks (P)   -TB       Row Name 08/03/23 1249          Transfer Goal 1 (PT)    Activity/Assistive Device (Transfer Goal 1, PT) transfers, all;sit-to-stand/stand-to-sit;bed-to-chair/chair-to-bed;walker, rolling (P)   -TB     [place] Level/Cues Needed (Transfer Goal 1, PT) modified independence (P)   -TB     Time Frame (Transfer Goal 1, PT) 2 weeks (P)   -TB       Row Name 08/03/23 1249          Gait Training Goal 1 (PT)    Activity/Assistive Device (Gait Training Goal 1, PT) gait (walking locomotion);assistive device use;walker, rolling (P)   -TB     [place] Level (Gait Training Goal 1, PT) modified independence (P)   -TB     Distance (Gait Training Goal 1, PT) 100 ft (P)   -TB     Time Frame (Gait Training Goal 1, PT) 2 weeks (P)   -TB       Row Name 08/03/23 1249          Therapy Assessment/Plan (PT)    Planned Therapy Interventions (PT) balance training;bed mobility training;gait training;neuromuscular re-education;patient/family education;postural re-education;strengthening;transfer training (P)   -TB               User Key  (r) = Recorded By, (t) = Taken By, (c) = Cosigned By      Initials Name Provider Type    TB Carmen Warner, PT Student PT Student                   Clinical Impression       [facility] Name 08/03/23 1124          Pain    Pain Intervention(s) Repositioned;Emotional support;Ambulation/increased activity (P)   -TB     Additional Documentation Pain Scale: FACES Pre/Post-Treatment (Group) (P)   -TB       [facility] Name 08/03/23 1124          Pain Scale: FACES Pre/Post-Treatment    Pain: FACES Scale, Pretreatment 2-->hurts little bit (P)   -TB     Posttreatment Pain Rating 2-->hurts little bit (P)   -TB     Pain Location posterior (P)   -TB     Pain Location - neck (P)   -TB       Row Name 08/03/23 1124          Plan of Care Review    Plan of Care Reviewed With patient (P)   -TB       Row Name 08/03/23 1124          Therapy Assessment/Plan (PT)    Rehab Potential (PT) good, to achieve stated  therapy goals (P)   -TB     Criteria for Skilled Interventions Met (PT) yes;meets criteria;skilled treatment is necessary (P)   -TB     Therapy Frequency (PT) daily (P)   -TB     Predicted Duration of Therapy Intervention (PT) until d/c (P)   -TB       Row Name 08/03/23 1124          Vital Signs    O2 Delivery Pre Treatment nasal cannula (P)   2L  -TB     O2 Delivery Intra Treatment nasal cannula (P)   2L  -TB     O2 Delivery Post Treatment nasal cannula (P)   2L  -TB     Pre Patient Position Sitting (P)   -TB     Intra Patient Position Standing (P)   -TB     Post Patient Position Sitting (P)   -TB     Recovery Time VSS (P)   -TB       Row Name 08/03/23 1124          Positioning and Restraints    Pre-Treatment Position sitting in chair/recliner (P)   -TB     Post Treatment Position chair (P)   -TB     In Chair reclined;sitting;call light within reach;encouraged to call for assist;exit alarm on (P)   -TB               User Key  (r) = Recorded By, (t) = Taken By, (c) = Cosigned By      Initials Name Provider Type    TB Carmen Warner, PT Student PT Student                   Outcome Measures       Row Name 08/03/23 1250 08/03/23 0807       How much help from another person do you currently need...    Turning from your back to your side while in flat bed without using bedrails? 3 (P)   -TB 3  -CS    Moving from lying on back to sitting on the side of a flat bed without bedrails? 3 (P)   -TB 3  -CS    Moving to and from a bed to a chair (including a wheelchair)? 3 (P)   -TB 3  -CS    Standing up from a chair using your arms (e.g., wheelchair, bedside chair)? 3 (P)   -TB 3  -CS    Climbing 3-5 steps with a railing? 3 (P)   -TB 3  -CS    To walk in hospital room? 3 (P)   -TB 3  -CS    AM-PAC 6 Clicks Score (PT) 18 (P)   -TB 18  -CS    Highest level of mobility 6 --> Walked 10 steps or more (P)   -TB 6 --> Walked 10 steps or more  -CS              User Key  (r) = Recorded By, (t) = Taken By, (c) = Cosigned By      Initials  Name Provider Type    CS Nohelia Graves, RN Registered Nurse    TB Carmen Warner, PT Student PT Student                                 Physical Therapy Education       Title: PT OT SLP Therapies (In Progress)       Topic: Physical Therapy (Done)       Point: Mobility training (Done)       Learning Progress Summary             Patient Acceptance, E,TB, VU,DU by TB at 8/3/2023 1251                         Point: Precautions (Done)       Learning Progress Summary             Patient Acceptance, E,TB, VU,DU by TB at 8/3/2023 1251                                         User Key       Initials Effective Dates Name Provider Type Discipline     05/09/23 -  Carmen Warner, PT Student PT Student PT                  PT Recommendation and Plan  Planned Therapy Interventions (PT): (P) balance training, bed mobility training, gait training, neuromuscular re-education, patient/family education, postural re-education, strengthening, transfer training  Plan of Care Reviewed With: (P) patient  Outcome Evaluation: (P) 73 y.o. female adm to [facility] on 7/31 s/p anterior fusion of C4-C6 and s/p posterior fusion of C4-C6 on 8/2. At baseline pt lives alone in a H w/ 0 RUFINA. She is indep w/ all ADLs, household and community mobility, and driving. Today pt was A&Ox4. Pt was up in chair upon PT arrival wearing her C-collar. Pt reported she required some assist w/ getting out of bed this morning prior to getting to the chair. She was CGA w/ STS w/ max verbal and tactile cues to places hands on arm rest and push off chair to stand while performing STS transfer. Pt ambulated 40 w/ rw and CGA. She demonstrated decreased vilma, short stride lengths, narrow DYLON and frequent path deviations requiring PT to assist w/ navigating rw during ambulation. Pt would benefit from skilled PT to address deficits. Recommend IRF at d/c. PT will follow.     Time Calculation:   PT Evaluation Complexity  History, PT Evaluation Complexity: (P) 3 or more  personal factors and/or comorbidities  Examination of Body Systems (PT Eval Complexity): (P) total of 3 or more elements  Clinical Presentation (PT Evaluation Complexity): (P) evolving  Clinical Decision Making (PT Evaluation Complexity): (P) moderate complexity  Overall Complexity (PT Evaluation Complexity): (P) moderate complexity     PT Charges       Row Name 08/03/23 1252             Time Calculation    Start Time 0827 (P)   -TB      Stop Time 0901 (P)   -TB      Time Calculation (min) 34 min (P)   -TB      PT Received On 08/03/23 (P)   -TB      PT - Next Appointment 08/04/23 (P)   -TB      PT Goal Re-Cert Due Date 08/17/23 (P)   -TB         Time Calculation- PT    Total Timed Code Minutes- PT 8 minute(s) (P)   -TB                User Key  (r) = Recorded By, (t) = Taken By, (c) = Cosigned By      Initials Name Provider Type    TB Carmen Warner, PT Student PT Student                  Therapy Charges for Today       Code Description Service Date Service Provider Modifiers Qty    75101219048 HC PT EVAL MOD COMPLEXITY 4 8/3/2023 Carmen Warner, PT Student GP 1    [id number]  GAIT TRAINING EA 15 MIN 8/3/2023 Carmen Warner, PT Student GP 1            PT G-Codes  Outcome Measure Options: AM-PAC 6 Clicks Daily Activity (OT), AM-PAC 6 Clicks Basic Mobility (PT)  AM-PAC 6 Clicks Score (PT): (P) 18  AM-PAC 6 Clicks Score (OT): 14  PT Discharge Summary  Anticipated Discharge Disposition (PT): (P) inpatient rehabilitation facility    Carmen Warner PT Student  8/3/2023

## 2023-08-04 ENCOUNTER — APPOINTMENT (OUTPATIENT)
Dept: GENERAL RADIOLOGY | Facility: HOSPITAL | Age: 73
DRG: 455 | End: 2023-08-04
Payer: MEDICARE

## 2023-08-04 VITALS
BODY MASS INDEX: 34.19 KG/M2 | RESPIRATION RATE: 16 BRPM | OXYGEN SATURATION: 94 % | SYSTOLIC BLOOD PRESSURE: 125 MMHG | DIASTOLIC BLOOD PRESSURE: 57 MMHG | TEMPERATURE: 97.6 F | WEIGHT: 185.8 LBS | HEART RATE: 94 BPM | HEIGHT: 62 IN

## 2023-08-04 PROCEDURE — 97535 SELF CARE MNGMENT TRAINING: CPT

## 2023-08-04 PROCEDURE — 94799 UNLISTED PULMONARY SVC/PX: CPT

## 2023-08-04 PROCEDURE — 97116 GAIT TRAINING THERAPY: CPT

## 2023-08-04 PROCEDURE — 97530 THERAPEUTIC ACTIVITIES: CPT

## 2023-08-04 PROCEDURE — 99024 POSTOP FOLLOW-UP VISIT: CPT | Performed by: NURSE PRACTITIONER

## 2023-08-04 PROCEDURE — 74018 RADEX ABDOMEN 1 VIEW: CPT

## 2023-08-04 PROCEDURE — 94761 N-INVAS EAR/PLS OXIMETRY MLT: CPT

## 2023-08-04 RX ORDER — HYDROCODONE BITARTRATE AND ACETAMINOPHEN 5; 325 MG/1; MG/1
1 TABLET ORAL EVERY 4 HOURS PRN
Qty: 30 TABLET | Refills: 0
Start: 2023-08-04 | End: 2023-08-07

## 2023-08-04 RX ORDER — AMOXICILLIN 250 MG
1 CAPSULE ORAL 2 TIMES DAILY
Qty: 30 TABLET | Refills: 0 | Status: SHIPPED | OUTPATIENT
Start: 2023-08-04 | End: 2023-08-04 | Stop reason: SDUPTHER

## 2023-08-04 RX ORDER — AMOXICILLIN 250 MG
1 CAPSULE ORAL 2 TIMES DAILY
Qty: 30 TABLET | Refills: 0 | Status: SHIPPED | OUTPATIENT
Start: 2023-08-04 | End: 2023-08-16

## 2023-08-04 RX ORDER — VALSARTAN AND HYDROCHLOROTHIAZIDE 320; 25 MG/1; MG/1
1 TABLET, FILM COATED ORAL DAILY
Qty: 90 TABLET | Refills: 3 | Status: SHIPPED | OUTPATIENT
Start: 2023-08-04

## 2023-08-04 RX ORDER — OMEPRAZOLE 40 MG/1
40 CAPSULE, DELAYED RELEASE ORAL DAILY
Qty: 90 CAPSULE | Refills: 3 | Status: SHIPPED | OUTPATIENT
Start: 2023-08-04 | End: 2023-08-16

## 2023-08-04 RX ORDER — HYDROCODONE BITARTRATE AND ACETAMINOPHEN 5; 325 MG/1; MG/1
1 TABLET ORAL EVERY 4 HOURS PRN
Qty: 30 TABLET | Refills: 0 | OUTPATIENT
Start: 2023-08-04 | End: 2023-08-07

## 2023-08-04 RX ADMIN — LEVOTHYROXINE SODIUM 25 MCG: 0.03 TABLET ORAL at 08:15

## 2023-08-04 RX ADMIN — HYDROCHLOROTHIAZIDE 25 MG: 25 TABLET ORAL at 08:15

## 2023-08-04 RX ADMIN — CITALOPRAM HYDROBROMIDE 20 MG: 20 TABLET ORAL at 08:15

## 2023-08-04 RX ADMIN — METOPROLOL SUCCINATE 100 MG: 50 TABLET, EXTENDED RELEASE ORAL at 08:15

## 2023-08-04 RX ADMIN — HYDROCODONE BITARTRATE AND ACETAMINOPHEN 1 TABLET: 5; 325 TABLET ORAL at 05:51

## 2023-08-04 RX ADMIN — MONTELUKAST 10 MG: 10 TABLET, FILM COATED ORAL at 08:15

## 2023-08-04 RX ADMIN — METHOCARBAMOL 750 MG: 750 TABLET ORAL at 08:15

## 2023-08-04 RX ADMIN — MAGNESIUM HYDROXIDE 10 ML: 2400 SUSPENSION ORAL at 08:14

## 2023-08-04 RX ADMIN — SENNOSIDES AND DOCUSATE SODIUM 1 TABLET: 50; 8.6 TABLET ORAL at 08:15

## 2023-08-04 RX ADMIN — HYDROCODONE BITARTRATE AND ACETAMINOPHEN 1 TABLET: 5; 325 TABLET ORAL at 00:53

## 2023-08-04 RX ADMIN — FAMOTIDINE 20 MG: 20 TABLET, FILM COATED ORAL at 08:15

## 2023-08-04 RX ADMIN — Medication 10 ML: at 08:16

## 2023-08-04 RX ADMIN — ATORVASTATIN CALCIUM 20 MG: 20 TABLET, FILM COATED ORAL at 08:15

## 2023-08-04 RX ADMIN — HYDRALAZINE HYDROCHLORIDE 50 MG: 25 TABLET, FILM COATED ORAL at 08:15

## 2023-08-04 RX ADMIN — PANTOPRAZOLE SODIUM 40 MG: 40 TABLET, DELAYED RELEASE ORAL at 05:51

## 2023-08-04 RX ADMIN — BISACODYL 10 MG: 10 SUPPOSITORY RECTAL at 11:49

## 2023-08-04 RX ADMIN — BUDESONIDE AND FORMOTEROL FUMARATE DIHYDRATE 1 PUFF: 160; 4.5 AEROSOL RESPIRATORY (INHALATION) at 08:28

## 2023-08-04 NOTE — PLAN OF CARE
Goal Outcome Evaluation:      Assessment: Lori Sutton presents with functional mobility impairments which indicate the need for skilled intervention. Pt ambulated increased this distance this date w/ HHA. She required modA for mikal care w/ wiping and pulling up her briefs. Pt continues to demonstrate decreased vilma, short step length, lack heel strike and push off during gait. Tolerating session today without incident. Will continue to follow and progress as tolerated.      Plan/Recommendations:   High Intensity Therapy recommended post-acute care. This is recommended as therapy feels the patient would require 5-6 days per week, 2-3 hours per day. At this time, inpatient rehabilitation (acute rehab) would be the first choice and SNF would be second.. Pt requires no DME at discharge.      Pt desires Inpatient Rehabilitation placement at discharge. Pt cooperative; agreeable to therapeutic recommendations and plan of care.

## 2023-08-04 NOTE — PROGRESS NOTES
Neurosurgery Progress Note      Patient: Lori Sutton    YOB: 1950    Medical Record Number: [id number]    Attending Physician: Ramy Ruiz MD    Date of Admission: 7/31/2023  5:22 AM    Status Post: DAY 1 -CERVICAL CORPECTOMY  LEVEL CERVICAL 5 WITH FUSION LEVELS CERVICAL 4-6 ANTERIOR; DAY 2, C4-C6 laminectomy and posterior lateral fusion with instrumentation    Post Operative Day Number: 4/2    Admitting Dx: Cervical spondylosis with myelopathy [M47.12]    General Appearance:  73 y.o. female awake and alert with c-collar on sitting up in chair.  She reports ambulating well.    Surgical drain in place with serosanguineous drainage she reports no acute neurologic complaints.  She is swallowing okay.    Current Problem List:     Cervical spondylosis with myelopathy    Hypertension    Hyperlipidemia    Back pain    Depression    Gout    Irritable bowel syndrome    Peripheral venous insufficiency    Idiopathic progressive neuropathy          Current Medications:  Scheduled Meds:atorvastatin, 20 mg, Oral, Daily  budesonide-formoterol, 1 puff, Inhalation, BID - RT  citalopram, 20 mg, Oral, Daily  enoxaparin, 40 mg, Subcutaneous, Daily  famotidine, 20 mg, Oral, BID  hydrALAZINE, 50 mg, Oral, TID  valsartan, 320 mg, Oral, Q24H   And  hydroCHLOROthiazide, 25 mg, Oral, Q24H  levothyroxine, 25 mcg, Oral, Daily  methocarbamol, 750 mg, Oral, TID  metoprolol succinate XL, 100 mg, Oral, Daily  montelukast, 10 mg, Oral, Daily  pantoprazole, 40 mg, Oral, Q AM  senna-docusate sodium, 1 tablet, Oral, BID  sodium chloride, 10 mL, Intravenous, Q12H      Continuous Infusions:   PRN Meds:.  acetaminophen    albuterol sulfate HFA    bisacodyl    HYDROcodone-acetaminophen    HYDROmorphone **AND** naloxone    hydrOXYzine    ipratropium-albuterol    magnesium hydroxide    melatonin    ondansetron    sodium chloride    sodium chloride      Diagnostic Tests:    Lab Results (last 24 hours)       ** No results found for  the last 24 hours. **            Imaging Results (Last 24 Hours)       ** No results found for the last 24 hours. **            Physical Exam:   General Appearance:  Alert, cooperative, in no acute distress   Head:  Normocephalic, without obvious abnormality, atraumatic   Neck:   Anterior cervical incision is well approximated, trachea is midline, scattered ecchymosis.  Posterior cervical dressing clean dry and intact.  Posterior cervical drain with minimal serosanguineous drainage.   Abdomen:   nontender, nondistended   Extremities/MSK: Moves all extremities well, no edema, no cyanosis, no             Redness    Skin: No bleeding, bruising or rash   Neurologic: Cranial nerves 2 - 12 grossly intact         Vitals:    08/04/23 0300 08/04/23 0816 08/04/23 0828 08/04/23 0830   BP: (!) 188/92 125/57     BP Location: Left arm      Patient Position: Sitting      Pulse: 94 80 97 94   Resp: 16  16 16   Temp: 97.6 øF (36.4 øC)      TempSrc: Oral      SpO2: 90%  94% 94%   Weight:       Height:         Posterior cervical DAYANA drain: 50mL last 12 hrs    Assessment: Patient is a 73-year-old female that underwent anterior and posterior cervical decompression and fusion with Dr. Mcgowan.  She is postop day 4 and 2 respectively.  She continues to do well postoperatively with well-controlled pain.  Surgical drain has slowed with output.  Patient okay for discharge per the neurosurgical standpoint which hopefully will be later today.  Her posterior drain can be removed prior to transfer to rehab.  I will place chika dressing on her posterior incision that needs to remain for 7 days.     plan:    Continue c-collar at all times  Continue drain and remove prior to rehab transfer  Continue Pain management efforts  Continue mobilization efforts  Continue incisional care  Continue DVT Prophylaxis    Discharge Plan: Rehab hopefully today.     I discussed presentation with patient, nursing staff and Dr. Mcgowan.    Date: 8/4/2023    Valery Patrick  APRN  09:02 EDT

## 2023-08-04 NOTE — THERAPY TREATMENT NOTE
Subjective: Pt agreeable to therapeutic plan of care.    Objective:     Bed mobility - N/A or Not attempted. - pt up in chair upon PT arrival   Transfers - SBA - sit <> stand   Ambulation - 15 + 80 feet SBA and w/ HHA - decreased vilma, short step length, lack HS and push off     Vitals: WNL    Pain: 2 VAS   Location: posterior neck  Intervention for pain: Repositioned, Increased Activity, and Therapeutic Presence    Education: Provided education on the importance of mobility in the acute care setting, Verbal/Tactile Cues, Transfer Training, and Gait Training    Assessment: Lori Sutton presents with functional mobility impairments which indicate the need for skilled intervention. Pt ambulated increased this distance this date w/ HHA. She required modA for mikal care w/ wiping and pulling up her briefs. Pt continues to demonstrate decreased vilma, short step length, lack heel strike and push off during gait. Tolerating session today without incident. Will continue to follow and progress as tolerated.     Plan/Recommendations:   High Intensity Therapy recommended post-acute care. This is recommended as therapy feels the patient would require 5-6 days per week, 2-3 hours per day. At this time, inpatient rehabilitation (acute rehab) would be the first choice and SNF would be second.. Pt requires no DME at discharge.     Pt desires Inpatient Rehabilitation placement at discharge. Pt cooperative; agreeable to therapeutic recommendations and plan of care.         Basic Mobility 6-click:  Rollin = Total, A lot = 2, A little = 3; 4 = None  Supine>Sit:   1 = Total, A lot = 2, A little = 3; 4 = None   Sit>Stand with arms:  1 = Total, A lot = 2, A little = 3; 4 = None  Bed>Chair:   1 = Total, A lot = 2, A little = 3; 4 = None  Ambulate in room:  1 = Total, A lot = 2, A little = 3; 4 = None  3-5 Steps with railin = Total, A lot = 2, A little = 3; 4 = None  Score: 18    Modified [place]: N/A = No pre-op  stroke/TIA    Post-Tx Position: Up in Chair and Call light and personal items within reach and alarm activated   PPE: gloves

## 2023-08-04 NOTE — PROGRESS NOTES
[facility] Medicine Services Daily Progress Note    Patient Name: Lori Sutton  : 1950  MRN: [id number]  Primary Care Physician:  Ayaz Luna MD  Date of admission: 2023    Subjective      Chief Complaint: Neck pain     73-year-old woman with a BMI of 33 as well as significant lung disease who has had a prior thyroidectomy and was a chronic everyday smoker at 2 packs/day for greater than 40 years.  She comments that approximately 20 years ago she was offered neck surgery for which she turned down.  She has had chronic conservative therapy including physical therapy multiple times.  She has had greater than 5 years of progressive numbness in her hands and feet.  She has progressive worsening of her dexterity as well as difficulty signing her name, buttoning a button as well as picking up small items off of a table.  She drops items regularly.  She has significant balance issues as well as proprioceptive issues that are worsened on uneven ground as well as in the dark.  I last evaluated her on 2023.  When I initially evaluated her in February she had a possible C5 radiculopathy but that distribution the pain had significantly improved by the time I saw her in April.  MI April appointment I recommended surgical decompression but she would need significant clearance including pulmonary clearance.  She has seen her pulmonologist and undergone pulmonary function testing and has been cleared.  She comments that her myelopathic symptoms continue to slowly progress.     2023 patient seen and examined in bed no acute distress, vital signs stable, she is complaining of itching around her neck.  Says her neck brace makes it worse.She is postop day 1.  She is moving all extremities.  Her pain is well controlled.  Her incision looks appropriate.  She is eating with no swallowing difficulties.  She will undergo her stage II procedure tomorrow for posterior fixation.     8/2/2023 patient seen in bed no acute distress, vital signs stable, for surgery today.  Discussed with RN.  8/3/23 patient seen and examined in bed no acute distress, vital signs stable, discussed with RN.  Patient with constipation bowel regimen ordered.  Awaiting placement.  8/4/23 patient seen and examined in bed no acute distress, vital signs stable, discussed with RN, patient was accepted at [facility] rehab.  Discussed with .    ROS HENT: Negative.     Eyes: Negative.    Respiratory: Negative.     Cardiovascular: Negative.    Gastrointestinal: Negative.    Endocrine: Negative.    Genitourinary: Negative.    Musculoskeletal:  Positive for arthralgias.   Skin: Negative.    Allergic/Immunologic: Negative.    Neurological:  Positive for numbness (tingling/feet).   Hematological: Negative.    Psychiatric/Behavioral: Negative.        Objective      Vitals:   Temp:  [97.4 øF (36.3 øC)-97.9 øF (36.6 øC)] 97.6 øF (36.4 øC)  Heart Rate:  [80-97] 94  Resp:  [13-16] 16  BP: (125-188)/(57-92) 125/57  Flow (L/min):  [2] 2    Physical Exam  Vitals and nursing note reviewed.   Constitutional:       General: She is not in acute distress.     Appearance: Normal appearance. She is well-developed. She is not ill-appearing, toxic-appearing or diaphoretic.   HENT:      Head: Normocephalic and atraumatic.      Nose: Nose normal. No congestion or rhinorrhea.      Mouth/Throat:      Mouth: Mucous membranes are moist.      Pharynx: No oropharyngeal exudate.   Eyes:      General: No scleral icterus.        Right eye: No discharge.         Left eye: No discharge.      Extraocular Movements: Extraocular movements intact.      Conjunctiva/sclera: Conjunctivae normal.      Pupils: Pupils are equal, round, and reactive to light.   Neck:      Thyroid: No thyromegaly.      Vascular: No carotid bruit or JVD.      Trachea: No tracheal deviation.      Comments: Neck brace in place  Cardiovascular:      Rate and Rhythm: Normal rate and  regular rhythm.      Pulses: Normal pulses.      Heart sounds: Normal heart sounds. No murmur heard.    No friction rub. No gallop.   Pulmonary:      Effort: Pulmonary effort is normal. No respiratory distress.      Breath sounds: Normal breath sounds. No stridor. No wheezing, rhonchi or rales.   Chest:      Chest wall: No tenderness.   Abdominal:      General: Bowel sounds are normal. There is no distension.      Palpations: Abdomen is soft. There is no mass.      Tenderness: There is no abdominal tenderness. There is no guarding or rebound.      Hernia: No hernia is present.   Musculoskeletal:         General: No swelling, tenderness, deformity or signs of injury. Normal range of motion.      Cervical back: No rigidity. No muscular tenderness.      Right lower leg: No edema.      Left lower leg: No edema.   Lymphadenopathy:      Cervical: No cervical adenopathy.   Skin:     General: Skin is warm and dry.      Coloration: Skin is not jaundiced or pale.      Findings: Erythema present. No bruising or rash.      Comments: Erythema around the upper part of the neck.   Neurological:      General: No focal deficit present.      Mental Status: She is alert and oriented to person, place, and time. Mental status is at baseline.      Cranial Nerves: No cranial nerve deficit.      Sensory: No sensory deficit.      Motor: No weakness or abnormal muscle tone.      Coordination: Coordination normal.   Psychiatric:         Mood and Affect: Mood normal.         Behavior: Behavior normal.         Thought Content: Thought content normal.         Judgment: Judgment normal.       Result Review    Result Review:  I have personally reviewed the results from the time of this admission to 8/4/2023 12:25 EDT and agree with these findings:  []  Laboratory  []  Microbiology  []  Radiology  []  EKG/Telemetry   []  Cardiology/Vascular   []  Pathology  []  Old records  []  Other:  Most notable findings include:     Wounds (last 24 hours)        LDA Wound       Row Name 08/04/23 1200 08/04/23 0810 08/03/23 2017       Wound 07/31/23 0818 Right throat Incision    Wound - Properties Group Placement Date: 07/31/23  -LT Placement Time: 0818  -LT Side: Right  -LT Location: throat  -LT Primary Wound Type: Incision  -LT    Dressing Appearance dry;intact  -CS dry;intact  -CS --    Closure -- ELOINA  -CS ELOINA  -JM    Base -- dressing in place, unable to visualize  -CS dressing in place, unable to visualize  -JM    Drainage Amount -- none  -CS none  -JM    Retired Wound - Properties Group Placement Date: 07/31/23  -LT Placement Time: 0818  -LT Side: Right  -LT Location: throat  -LT Primary Wound Type: Incision  -LT    Retired Wound - Properties Group Date first assessed: 07/31/23  -LT Time first assessed: 0818  -LT Side: Right  -LT Location: throat  -LT Primary Wound Type: Incision  -LT       Wound 08/02/23 0940 posterior cervical spine Incision    Wound - Properties Group Placement Date: 08/02/23  -TT Placement Time: 0940  -TT Present on Hospital Admission: N  -TT Orientation: posterior  -TT Location: cervical spine  -TT Primary Wound Type: Incision  -TT    Dressing Appearance dry;intact  -CS dry;intact  -CS --    Closure -- ELOINA  -CS ELOINA  -JM    Base -- dressing in place, unable to visualize  -CS dressing in place, unable to visualize  -JM    Drainage Amount -- none  -CS none  -JM    Dressing Care --  chika dressing  -CS -- --    Retired Wound - Properties Group Placement Date: 08/02/23  -TT Placement Time: 0940  -TT Present on Hospital Admission: N  -TT Orientation: posterior  -TT Location: cervical spine  -TT Primary Wound Type: Incision  -TT    Retired Wound - Properties Group Date first assessed: 08/02/23  -TT Time first assessed: 0940  -TT Present on Hospital Admission: N  -TT Location: cervical spine  -TT Primary Wound Type: Incision  -TT      Row Name 08/03/23 1616             Wound 07/31/23 0818 Right throat Incision    Wound - Properties Group Placement Date:  07/31/23  -LT Placement Time: 0818  -LT Side: Right  -LT Location: throat  -LT Primary Wound Type: Incision  -LT    Dressing Appearance dry;intact  -CS      Retired Wound - Properties Group Placement Date: 07/31/23  -LT Placement Time: 0818  -LT Side: Right  -LT Location: throat  -LT Primary Wound Type: Incision  -LT    Retired Wound - Properties Group Date first assessed: 07/31/23  -LT Time first assessed: 0818  -LT Side: Right  -LT Location: throat  -LT Primary Wound Type: Incision  -LT       Wound 08/02/23 0940 posterior cervical spine Incision    Wound - Properties Group Placement Date: 08/02/23  -TT Placement Time: 0940  -TT Present on Hospital Admission: N  -TT Orientation: posterior  -TT Location: cervical spine  -TT Primary Wound Type: Incision  -TT    Dressing Appearance dry;intact  -CS      Retired Wound - Properties Group Placement Date: 08/02/23  -TT Placement Time: 0940  -TT Present on Hospital Admission: N  -TT Orientation: posterior  -TT Location: cervical spine  -TT Primary Wound Type: Incision  -TT    Retired Wound - Properties Group Date first assessed: 08/02/23  -TT Time first assessed: 0940  -TT Present on Hospital Admission: N  -TT Location: cervical spine  -TT Primary Wound Type: Incision  -TT              User Key  (r) = Recorded By, (t) = Taken By, (c) = Cosigned By      Initials Name Provider Type    TT Geeta Collins RN Registered Nurse    Evon Saha RN Registered Nurse    Nohelia Clark RN Registered Nurse    Jess Varghese LPN Licensed Nurse                    CBC:      Lab 08/01/23  0555   WBC 7.60   HEMOGLOBIN 11.5*   HEMATOCRIT 35.2   PLATELETS 247   NEUTROS ABS 6.50   LYMPHS ABS 0.40*   MONOS ABS 0.70   EOS ABS 0.00   .5*       CMP:        Lab 08/01/23  0555   SODIUM 137   POTASSIUM 4.5   CHLORIDE 97*   CO2 30.0*   ANION GAP 10.0   BUN 9   CREATININE 0.54*   EGFR 96.1   GLUCOSE 165*   CALCIUM 8.8       No results found for: ACANTHNAEG, AFBCX,  BPERTUSSISCX, BLOODCX  No results found for: BCIDPCR, CXREFLEX, CSFCX, CULTURETIS  No results found for: CULTURES, HSVCX, URCX  No results found for: EYECULTURE, GCCX, HSVCULTURE, LABHSV  No results found for: LEGIONELLA, MRSACX, MUMPSCX, MYCOPLASCX  No results found for: NOCARDIACX, STOOLCX  No results found for: THROATCX, UNSTIMCULT, URINECX, CULTURE, VZVCULTUR  No results found for: VIRALCULTU, WOUNDCX    Assessment & Plan      Brief Patient Summary:  Lori Sutton is a 73 y.o. female who       atorvastatin, 20 mg, Oral, Daily  budesonide-formoterol, 1 puff, Inhalation, BID - RT  citalopram, 20 mg, Oral, Daily  enoxaparin, 40 mg, Subcutaneous, Daily  famotidine, 20 mg, Oral, BID  hydrALAZINE, 50 mg, Oral, TID  valsartan, 320 mg, Oral, Q24H   And  hydroCHLOROthiazide, 25 mg, Oral, Q24H  levothyroxine, 25 mcg, Oral, Daily  methocarbamol, 750 mg, Oral, TID  metoprolol succinate XL, 100 mg, Oral, Daily  montelukast, 10 mg, Oral, Daily  pantoprazole, 40 mg, Oral, Q AM  senna-docusate sodium, 1 tablet, Oral, BID  sodium chloride, 10 mL, Intravenous, Q12H             Active Hospital Problems:  Active Hospital Problems    Diagnosis     **Cervical spondylosis with myelopathy     Idiopathic progressive neuropathy     Hypertension     Hyperlipidemia     Back pain     Depression     Gout     Irritable bowel syndrome     Peripheral venous insufficiency      73-year-old female that is s/p C4-C6 ACDF with C5 corpectomy with Dr. Mcgowan.  POD#2  Continue present care  Per neurosurgery will undergo her stage II procedure today for posterior fixation.  continue to wear her c-collar at all times.  Pain management per neurosurgery  Continue mobilization efforts  Wound care   GI prophylaxis-Protonix    Hyperlipidemia continue atorvastatin  Hypertension continue metoprolol, hydralazine   depression continue citalopram    DVT prophylaxis:  Medical and mechanical DVT prophylaxis orders are present.    CODE STATUS:         Disposition:   I expect patient to be discharged Referral to [facility], pending acceptance. No precert required. PASRR approved. .    I have utilized all available, immediate resources to obtain, update, or review the patient's current medications including all prescriptions, over-the-counter products, herbals, cannabis/cannabidiol products, and vitamin.mineral/dietary (nutritional) supplements.         Admission Status:  I believe this patient meets rehab status.    Hospital Medicine Quality Measures for Heart Failure:      This patient has been examined wearing appropriate Personal Protective Equipment and discussed with  rn . 08/04/23      Electronically signed by Ramy Ruiz MD, 08/04/23, 12:25 EDT.  [facility] Hospitalist Team

## 2023-08-04 NOTE — CASE MANAGEMENT/SOCIAL WORK
Continued Stay Note  MIRLANDE Arrington     Patient Name: Lori Sutton  MRN: [id number]  Today's Date: 8/4/2023    Admit Date: 7/31/2023    Plan: [facility] accepted.  no precert required.  PASRR approved (Not needed for IPR)   Discharge Plan       Row Name 08/04/23 1035       Plan    Plan [facility] accepted.  no precert required.  PASRR approved (Not needed for IPR)    Plan Comments verified that [facility] has bed avail today.  CM contacted by [facility] liasion and stated that daughter was uncertain that pt could transport in their private vehicle. [facility] Van is avail to transport at 2pm today and has o2 avail if pt needs.  RN updated with time of transportation via secure chat.                      Expected Discharge Date and Time       Expected Discharge Date Expected Discharge Time    Aug 4, 2023               Shelby Zaragoza RN

## 2023-08-04 NOTE — PLAN OF CARE
Goal Outcome Evaluation:      Patient is A & O X 4. The patient has been up and ambulating to the bathroom and back to bed with no problems. The patients pain was treated per the MAR. Patient has been sleeping most of this shift. The patient is able to make her needs known and the call light is with in reach. Will continue with the patients care.

## 2023-08-07 NOTE — CASE MANAGEMENT/SOCIAL WORK
Case Management Discharge Note      Final Note: [facility]         Selected Continued Care - Discharged on 8/4/2023 Admission date: 7/31/2023 - Discharge disposition: Rehab Facility or Unit (DC - External)      Destination Coordination complete.      Service Provider Selected Services Address Phone Fax Patient Preferred    [facility] [address] [place] [address] [place] [address] [phone] --                     Transportation Services  W/C Van: Other ([facility])    Final Discharge Disposition Code: 62 - inpatient rehab facility

## 2023-08-08 ENCOUNTER — TELEPHONE (OUTPATIENT)
Dept: NEUROSURGERY | Facility: CLINIC | Age: 73
End: 2023-08-08
Payer: MEDICARE

## 2023-08-08 RX ORDER — CYCLOBENZAPRINE HCL 10 MG
10 TABLET ORAL 3 TIMES DAILY PRN
Qty: 60 TABLET | Refills: 1 | OUTPATIENT
Start: 2023-08-08

## 2023-08-08 NOTE — TELEPHONE ENCOUNTER
Called [facility] back and spoke with the patient's nurse Tete. I asked where her pain is located? She said that her pain is located around her incision. I asked if it is red, swollen, draining any fevers etc.? And she said no it looks fine, her Lui dressing is still in place and everything looks fine. I asked if she has a muscle relaxer ordered? She said no she only has Norco ordered. I did explain that she will have increased pain with this surgery it is not uncommon. I told her I will ask Dr. Mcgowan and then give her a call back as the pain she is describing sounds muscular.     Verbally spoke with Dr. Mcgowan about the above. He ordered Flexeril 10 mg tid the first 3 doses scheduled then she can take it 3 times daily as needed.    Called [facility] back and gave her the above orders. We just had to give a verbal order as they will enter it for their pharmacy at [facility].

## 2023-08-08 NOTE — TELEPHONE ENCOUNTER
Tete with [facility] called and stated that the family is concerned that there is something wrong with the patient because her pain has become uncontrollable and the patient doesn't want to participate in Therapy. Tete also stated that they are now having to give her a Norco 7.5 every 4 hrs around the clock and she wants to know if an earlier appointment is needed or maybe something else can be prescribed.

## 2023-08-09 DIAGNOSIS — J45.40 MODERATE PERSISTENT REACTIVE AIRWAY DISEASE WITHOUT COMPLICATION: Primary | ICD-10-CM

## 2023-08-09 DIAGNOSIS — I10 PRIMARY HYPERTENSION: ICD-10-CM

## 2023-08-09 DIAGNOSIS — J45.41 MODERATE PERSISTENT REACTIVE AIRWAY DISEASE WITH ACUTE EXACERBATION: ICD-10-CM

## 2023-08-14 NOTE — DISCHARGE SUMMARY
Lori Sutton  1950    Patient Care Team:  Ayaz Luna MD as PCP - Amanda Simon MD as Consulting Physician (Plastic Surgery)    Date of Admit: 7/31/2023    Date of Discharge:  8/4/2023    Discharge Diagnosis:  Cervical spondylosis with myelopathy    Hypertension    Hyperlipidemia    Back pain    Depression    Gout    Irritable bowel syndrome    Peripheral venous insufficiency    Idiopathic progressive neuropathy      Procedures Performed  Procedure(s):  Day 1:DAY 1 -CERVICAL CORPECTOMY LEVEL CERVICAL 5 WITH FUSION LEVELS CERVICAL 4-6 ANTERIOR   DAY 2: CERVICAL 4-6 LAMINECTOMY & POSTERIOR LATERAL FUSION WITH INSTRUMENTATION 3-4 LEVELS       Complications: none    Consultants:   Consults       No orders found from 7/2/2023 to 8/1/2023.            Condition on Discharge: stable    Discharge disposition: home    HPI: Lori Sutton is a 73 y.o. female who presented with C4-C5 spondylolisthesis resulting in severe spinal canal stenosis and severe progressive cervical myelopathy who consented for above two-staged procedure.     Hospital Course: Patient admitted for above procedure. The patient was transferred to [facility] following recovery.  Patient recovered well postoperatively from both stages of her surgical procedures.   She had good resolution of her presurgical symptoms.  She had well-controlled pain and her posterior surgical drain was removed on postop day 2 of her second surgery.  She was taken in good p.o. and urinating freely.  She was ambulating well with physical therapy.  A chika dressing was placed on her posterior incision that needed to remain for 7 days.  Patient was okay for discharge to rehab per the neurosurgical standpoint.  She was to continue wearing her c-collar at all times.    Vitals:    08/04/23 0830   BP:    Pulse: 94   Resp: 16   Temp:    SpO2: 94%         Lab Results (last 24 hours)       ** No results found for the last 24 hours. **            Imaging  Results (Last 24 Hours)       Procedure Component Value Units Date/Time    XR Abdomen KUB [[id number]] Collected: 08/04/23 1139     Updated: 08/04/23 1144    Narrative:      XR ABDOMEN KUB    Date of Exam: 8/4/2023 11:26 AM EDT    Indication: constipation, r/o obstruction    Comparison: CT abdomen and pelvis 5/30/2018    Findings:  No abnormal small bowel distention is seen. There is stool in the ascending colon, transverse colon, descending colon, and rectosigmoid. Included lung bases are clear. There are degenerative changes of the lumbar spine with associated rightward   curvature.      Impression:      Impression:  Nonobstructive bowel gas pattern. Stool throughout the colon, consistent with provided history of constipation.      Electronically Signed: Ayanna Bella    8/4/2023 11:42 AM EDT    Workstation ID: [id number]              Discharge Physical Exam:      General Appearance:    Alert, cooperative, in no acute distress   Head:    Normocephalic, without obvious abnormality, atraumatic   Neck:   Anterior cervical incision well approximated, trachea midline, scattered ecchymosis.  Posterior cervical dressing clean dry and intact, posterior cervical drain with minimal serosanguineous drainage.   Abdomen:    non-tender, non-distended   Extremities/MSK:   Moves all extremities well, no edema, no cyanosis, no             Redness    Skin:   No bleeding, bruising or rash   Neurologic:   Cranial nerves 2 - 12 grossly intact       Discharge Medications  Inspect has been reviewed and narcotic consent is on file in the patient's chart.     Your medication list        START taking these medications        Instructions Last Dose Given Next Dose Due   HYDROcodone-acetaminophen 5-325 MG per tablet  Commonly known as: NORCO      Take 1 tablet by mouth Every 4 (Four) Hours As Needed for Moderate Pain for up to 3 days.       magnesium hydroxide 2400 MG/10ML suspension suspension  Commonly known as: MILK OF MAGNESIA      Take 10  mL by mouth Daily As Needed (constipation).       sennosides-docusate 8.6-50 MG per tablet  Commonly known as: PERICOLACE      Take 1 tablet by mouth 2 (Two) Times a Day.              CHANGE how you take these medications        Instructions Last Dose Given Next Dose Due   omeprazole 40 MG capsule  Commonly known as: priLOSEC  What changed: Another medication with the same name was changed. Make sure you understand how and when to take each.      Take 1 capsule by mouth Daily As Needed.       omeprazole 40 MG capsule  Commonly known as: priLOSEC  What changed:   how much to take  how to take this  when to take this  reasons to take this      Take 1 capsule by mouth Daily.              CONTINUE taking these medications        Instructions Last Dose Given Next Dose Due   albuterol sulfate  (90 Base) MCG/ACT inhaler  Commonly known as: PROVENTIL HFA;VENTOLIN HFA;PROAIR HFA      INHALE 2 PUFFS BY MOUTH EVERY 4 HOURS AS NEEDED FOR WHEEZING       allopurinol 300 MG tablet  Commonly known as: ZYLOPRIM      TAKE 1 TABLET BY MOUTH  DAILY       atorvastatin 20 MG tablet  Commonly known as: LIPITOR      TAKE 1 TABLET BY MOUTH ONCE DAILY       budesonide 0.5 MG/2ML nebulizer solution  Commonly known as: Pulmicort      Take 2 mL by nebulization 2 (Two) Times a Day for 30 days.       cholecalciferol 25 MCG (1000 UT) tablet  Commonly known as: VITAMIN D3      Take 1 tablet by mouth Daily.       citalopram 20 MG tablet  Commonly known as: CeleXA      TAKE 1 TABLET BY MOUTH  DAILY       fish oil 1000 MG capsule capsule      Take 1 capsule by mouth Daily With Breakfast.       furosemide 40 MG tablet  Commonly known as: LASIX      Take 1 tablet by mouth Daily.       hydrALAZINE 50 MG tablet  Commonly known as: APRESOLINE      TAKE 1 TABLET BY MOUTH 3  TIMES DAILY       ipratropium-albuterol 0.5-2.5 mg/3 ml nebulizer  Commonly known as: DUO-NEB      Take 3 mL by nebulization Every 4 (Four) Hours As Needed for Wheezing or  Shortness of Air for up to 60 doses.       levothyroxine 25 MCG tablet  Commonly known as: SYNTHROID, LEVOTHROID      TAKE 1 TABLET BY MOUTH  DAILY       metoprolol succinate  MG 24 hr tablet  Commonly known as: TOPROL-XL      TAKE 1 TABLET BY MOUTH  DAILY       montelukast 10 MG tablet  Commonly known as: SINGULAIR      TAKE 1 TABLET BY MOUTH  DAILY       potassium chloride 10 MEQ CR tablet  Commonly known as: K-DUR,KLOR-CON      Take 1 tablet by mouth Daily.       Restasis 0.05 % ophthalmic emulsion  Generic drug: cycloSPORINE      Administer 1 drop to both eyes Daily As Needed.       Symbicort 80-4.5 MCG/ACT inhaler  Generic drug: budesonide-formoterol      INHALE 2 INHALATIONS BY  MOUTH TWICE DAILY       triamcinolone 0.5 % ointment  Commonly known as: KENALOG      APPLY TOPICALLY TO THE  APPROPRIATE AREA AS  DIRECTED TWICE DAILY       valsartan-hydrochlorothiazide 320-25 MG per tablet  Commonly known as: DIOVAN-HCT      Take 1 tablet by mouth Daily.       vitamin E 100 UNIT capsule      Take 1 capsule by mouth Daily.                 Where to Get Your Medications        These medications were sent to [facility] [address] [place] [address]      Hours: Mon-Fri 7:00AM-7:00PM Phone: [phone]   omeprazole 40 MG capsule  sennosides-docusate 8.6-50 MG per tablet  valsartan-hydrochlorothiazide 320-25 MG per tablet       Information about where to get these medications is not yet available    Ask your nurse or doctor about these medications  HYDROcodone-acetaminophen 5-325 MG per tablet  magnesium hydroxide 2400 MG/10ML suspension suspension         Discharge Diet: Soft diet  Diet Instructions    regular           Activity at Discharge:   Activity Instructions       Gradually Increase Activity Until at Pre-Hospitalization Level     Additional Activity Instructions:    C-collar on at all times except hygiene  Lui dressing on for 7 days           Call for: questions or  concerns    Follow-up Appointments  Future Appointments   Date Time Provider Department [place]   8/17/2023  9:45 AM Wilber Mcgowan MD [facility]   8/25/2023  1:30 PM Ayaz Luna MD [facility]   5/28/2024 11:50 AM Diego Garza MD [facility]      Contact information for follow-up providers       Ayaz Luna MD .    Specialty: Family Medicine  Why: 2 week  Contact information:  [address]  [place] [address] [phone]               Ayaz Luna MD .    Specialty: Family Medicine  Contact information:  [address]  [place] [address] [phone]                       Contact information for after-discharge care       Destination       [facility] .    Service: Inpatient Rehabilitation  Contact information:  [facility] [address] [phone]                                   Findings were discussed with patient, nursing and Dr. Mcgowan.      Valery Patrick, APRN  08/14/23  10:55 EDT

## 2023-08-16 ENCOUNTER — APPOINTMENT (OUTPATIENT)
Dept: MRI IMAGING | Facility: HOSPITAL | Age: 73
End: 2023-08-16
Payer: MEDICARE

## 2023-08-16 ENCOUNTER — APPOINTMENT (OUTPATIENT)
Dept: CT IMAGING | Facility: HOSPITAL | Age: 73
End: 2023-08-16
Payer: MEDICARE

## 2023-08-16 ENCOUNTER — APPOINTMENT (OUTPATIENT)
Dept: GENERAL RADIOLOGY | Facility: HOSPITAL | Age: 73
End: 2023-08-16
Payer: MEDICARE

## 2023-08-16 ENCOUNTER — HOSPITAL ENCOUNTER (OUTPATIENT)
Facility: HOSPITAL | Age: 73
Setting detail: OBSERVATION
Discharge: NURSING, REHAB OR LONG-TERM CARE | End: 2023-08-18
Attending: EMERGENCY MEDICINE
Payer: MEDICARE

## 2023-08-16 DIAGNOSIS — G45.9 TIA (TRANSIENT ISCHEMIC ATTACK): Primary | ICD-10-CM

## 2023-08-16 LAB
ABO GROUP BLD: NORMAL
ALBUMIN SERPL-MCNC: 3.9 G/DL (ref 3.5–5.2)
ALBUMIN/GLOB SERPL: 1.3 G/DL
ALP SERPL-CCNC: 98 U/L (ref 39–117)
ALT SERPL W P-5'-P-CCNC: 16 U/L (ref 1–33)
ANION GAP SERPL CALCULATED.3IONS-SCNC: 10 MMOL/L (ref 5–15)
APTT PPP: 25 SECONDS (ref 24–31)
AST SERPL-CCNC: 19 U/L (ref 1–32)
BASOPHILS # BLD AUTO: 0.1 10*3/MM3 (ref 0–0.2)
BASOPHILS NFR BLD AUTO: 0.6 % (ref 0–1.5)
BILIRUB SERPL-MCNC: 0.3 MG/DL (ref 0–1.2)
BLD GP AB SCN SERPL QL: NEGATIVE
BUN SERPL-MCNC: 22 MG/DL (ref 8–23)
BUN/CREAT SERPL: 23.2 (ref 7–25)
CALCIUM SPEC-SCNC: 9.6 MG/DL (ref 8.6–10.5)
CHLORIDE SERPL-SCNC: 88 MMOL/L (ref 98–107)
CHOLEST SERPL-MCNC: 177 MG/DL (ref 0–200)
CO2 SERPL-SCNC: 32 MMOL/L (ref 22–29)
CREAT SERPL-MCNC: 0.95 MG/DL (ref 0.57–1)
DEPRECATED RDW RBC AUTO: 46.8 FL (ref 37–54)
EGFRCR SERPLBLD CKD-EPI 2021: 62.6 ML/MIN/1.73
EOSINOPHIL # BLD AUTO: 0.2 10*3/MM3 (ref 0–0.4)
EOSINOPHIL NFR BLD AUTO: 2.8 % (ref 0.3–6.2)
ERYTHROCYTE [DISTWIDTH] IN BLOOD BY AUTOMATED COUNT: 12.9 % (ref 12.3–15.4)
GLOBULIN UR ELPH-MCNC: 2.9 GM/DL
GLUCOSE SERPL-MCNC: 118 MG/DL (ref 65–99)
HBA1C MFR BLD: 5.8 % (ref 4.8–5.6)
HCT VFR BLD AUTO: 35.5 % (ref 34–46.6)
HDLC SERPL-MCNC: 56 MG/DL (ref 40–60)
HGB BLD-MCNC: 12.2 G/DL (ref 12–15.9)
HOLD SPECIMEN: NORMAL
INR PPP: 1.02 (ref 0.93–1.1)
LDLC SERPL CALC-MCNC: 88 MG/DL (ref 0–100)
LDLC/HDLC SERPL: 1.47 {RATIO}
LYMPHOCYTES # BLD AUTO: 0.9 10*3/MM3 (ref 0.7–3.1)
LYMPHOCYTES NFR BLD AUTO: 10.1 % (ref 19.6–45.3)
MCH RBC QN AUTO: 34 PG (ref 26.6–33)
MCHC RBC AUTO-ENTMCNC: 34.3 G/DL (ref 31.5–35.7)
MCV RBC AUTO: 99.1 FL (ref 79–97)
MONOCYTES # BLD AUTO: 1 10*3/MM3 (ref 0.1–0.9)
MONOCYTES NFR BLD AUTO: 11.2 % (ref 5–12)
NEUTROPHILS NFR BLD AUTO: 6.6 10*3/MM3 (ref 1.7–7)
NEUTROPHILS NFR BLD AUTO: 75.3 % (ref 42.7–76)
NRBC BLD AUTO-RTO: 0 /100 WBC (ref 0–0.2)
PLATELET # BLD AUTO: 414 10*3/MM3 (ref 140–450)
PMV BLD AUTO: 6.6 FL (ref 6–12)
POTASSIUM SERPL-SCNC: 4.1 MMOL/L (ref 3.5–5.2)
PROT SERPL-MCNC: 6.8 G/DL (ref 6–8.5)
PROTHROMBIN TIME: 10.9 SECONDS (ref 9.6–11.7)
RBC # BLD AUTO: 3.59 10*6/MM3 (ref 3.77–5.28)
RH BLD: POSITIVE
SODIUM SERPL-SCNC: 130 MMOL/L (ref 136–145)
T&S EXPIRATION DATE: NORMAL
TRIGL SERPL-MCNC: 194 MG/DL (ref 0–150)
TROPONIN T SERPL HS-MCNC: 20 NG/L
TSH SERPL DL<=0.05 MIU/L-ACNC: 3.28 UIU/ML (ref 0.27–4.2)
VIT B12 BLD-MCNC: >2000 PG/ML (ref 211–946)
VLDLC SERPL-MCNC: 33 MG/DL (ref 5–40)
WBC NRBC COR # BLD: 8.7 10*3/MM3 (ref 3.4–10.8)
WHOLE BLOOD HOLD COAG: NORMAL
WHOLE BLOOD HOLD SPECIMEN: NORMAL

## 2023-08-16 PROCEDURE — 25010000002 LORAZEPAM PER 2 MG: Performed by: INTERNAL MEDICINE

## 2023-08-16 PROCEDURE — 85610 PROTHROMBIN TIME: CPT | Performed by: EMERGENCY MEDICINE

## 2023-08-16 PROCEDURE — 85025 COMPLETE CBC W/AUTO DIFF WBC: CPT | Performed by: EMERGENCY MEDICINE

## 2023-08-16 PROCEDURE — 70496 CT ANGIOGRAPHY HEAD: CPT

## 2023-08-16 PROCEDURE — 96372 THER/PROPH/DIAG INJ SC/IM: CPT

## 2023-08-16 PROCEDURE — 84443 ASSAY THYROID STIM HORMONE: CPT | Performed by: NURSE PRACTITIONER

## 2023-08-16 PROCEDURE — 70498 CT ANGIOGRAPHY NECK: CPT

## 2023-08-16 PROCEDURE — 25010000002 GADOTERIDOL PER 1 ML: Performed by: FAMILY MEDICINE

## 2023-08-16 PROCEDURE — 96361 HYDRATE IV INFUSION ADD-ON: CPT

## 2023-08-16 PROCEDURE — G0378 HOSPITAL OBSERVATION PER HR: HCPCS

## 2023-08-16 PROCEDURE — 86900 BLOOD TYPING SEROLOGIC ABO: CPT | Performed by: EMERGENCY MEDICINE

## 2023-08-16 PROCEDURE — 36415 COLL VENOUS BLD VENIPUNCTURE: CPT | Performed by: EMERGENCY MEDICINE

## 2023-08-16 PROCEDURE — 93005 ELECTROCARDIOGRAM TRACING: CPT | Performed by: EMERGENCY MEDICINE

## 2023-08-16 PROCEDURE — 85730 THROMBOPLASTIN TIME PARTIAL: CPT | Performed by: EMERGENCY MEDICINE

## 2023-08-16 PROCEDURE — 80053 COMPREHEN METABOLIC PANEL: CPT | Performed by: EMERGENCY MEDICINE

## 2023-08-16 PROCEDURE — A9579 GAD-BASE MR CONTRAST NOS,1ML: HCPCS | Performed by: FAMILY MEDICINE

## 2023-08-16 PROCEDURE — 86901 BLOOD TYPING SEROLOGIC RH(D): CPT | Performed by: EMERGENCY MEDICINE

## 2023-08-16 PROCEDURE — 70450 CT HEAD/BRAIN W/O DYE: CPT

## 2023-08-16 PROCEDURE — 80061 LIPID PANEL: CPT | Performed by: NURSE PRACTITIONER

## 2023-08-16 PROCEDURE — 84484 ASSAY OF TROPONIN QUANT: CPT | Performed by: EMERGENCY MEDICINE

## 2023-08-16 PROCEDURE — 70553 MRI BRAIN STEM W/O & W/DYE: CPT

## 2023-08-16 PROCEDURE — 25010000002 ENOXAPARIN PER 10 MG: Performed by: INTERNAL MEDICINE

## 2023-08-16 PROCEDURE — 71045 X-RAY EXAM CHEST 1 VIEW: CPT

## 2023-08-16 PROCEDURE — 86850 RBC ANTIBODY SCREEN: CPT | Performed by: EMERGENCY MEDICINE

## 2023-08-16 PROCEDURE — 99285 EMERGENCY DEPT VISIT HI MDM: CPT

## 2023-08-16 PROCEDURE — 99214 OFFICE O/P EST MOD 30 MIN: CPT | Performed by: NURSE PRACTITIONER

## 2023-08-16 PROCEDURE — 25510000001 IOPAMIDOL PER 1 ML: Performed by: EMERGENCY MEDICINE

## 2023-08-16 PROCEDURE — 82607 VITAMIN B-12: CPT | Performed by: NURSE PRACTITIONER

## 2023-08-16 PROCEDURE — 96374 THER/PROPH/DIAG INJ IV PUSH: CPT

## 2023-08-16 PROCEDURE — 83036 HEMOGLOBIN GLYCOSYLATED A1C: CPT | Performed by: NURSE PRACTITIONER

## 2023-08-16 RX ORDER — VALSARTAN 80 MG/1
320 TABLET ORAL
Status: DISCONTINUED | OUTPATIENT
Start: 2023-08-16 | End: 2023-08-18 | Stop reason: HOSPADM

## 2023-08-16 RX ORDER — MELATONIN
1000 DAILY
Status: DISCONTINUED | OUTPATIENT
Start: 2023-08-16 | End: 2023-08-18 | Stop reason: HOSPADM

## 2023-08-16 RX ORDER — LEVOTHYROXINE SODIUM 0.03 MG/1
25 TABLET ORAL
Status: DISCONTINUED | OUTPATIENT
Start: 2023-08-17 | End: 2023-08-18 | Stop reason: HOSPADM

## 2023-08-16 RX ORDER — MONTELUKAST SODIUM 10 MG/1
10 TABLET ORAL NIGHTLY
Status: DISCONTINUED | OUTPATIENT
Start: 2023-08-16 | End: 2023-08-18 | Stop reason: HOSPADM

## 2023-08-16 RX ORDER — HYDROCODONE BITARTRATE AND ACETAMINOPHEN 5; 325 MG/1; MG/1
1 TABLET ORAL EVERY 4 HOURS PRN
Status: DISCONTINUED | OUTPATIENT
Start: 2023-08-16 | End: 2023-08-18 | Stop reason: HOSPADM

## 2023-08-16 RX ORDER — CITALOPRAM 20 MG/1
20 TABLET ORAL DAILY
Status: DISCONTINUED | OUTPATIENT
Start: 2023-08-16 | End: 2023-08-18 | Stop reason: HOSPADM

## 2023-08-16 RX ORDER — SODIUM CHLORIDE 0.9 % (FLUSH) 0.9 %
10 SYRINGE (ML) INJECTION EVERY 12 HOURS SCHEDULED
Status: DISCONTINUED | OUTPATIENT
Start: 2023-08-16 | End: 2023-08-18 | Stop reason: HOSPADM

## 2023-08-16 RX ORDER — SODIUM CHLORIDE 9 MG/ML
40 INJECTION, SOLUTION INTRAVENOUS AS NEEDED
Status: DISCONTINUED | OUTPATIENT
Start: 2023-08-16 | End: 2023-08-18 | Stop reason: HOSPADM

## 2023-08-16 RX ORDER — ALLOPURINOL 300 MG/1
300 TABLET ORAL DAILY
Status: DISCONTINUED | OUTPATIENT
Start: 2023-08-16 | End: 2023-08-18 | Stop reason: HOSPADM

## 2023-08-16 RX ORDER — ALBUTEROL SULFATE 2.5 MG/3ML
2.5 SOLUTION RESPIRATORY (INHALATION) EVERY 4 HOURS PRN
Status: DISCONTINUED | OUTPATIENT
Start: 2023-08-16 | End: 2023-08-18 | Stop reason: HOSPADM

## 2023-08-16 RX ORDER — HYDROCODONE BITARTRATE AND ACETAMINOPHEN 5; 325 MG/1; MG/1
1 TABLET ORAL EVERY 4 HOURS PRN
COMMUNITY

## 2023-08-16 RX ORDER — FUROSEMIDE 40 MG/1
40 TABLET ORAL DAILY
Status: DISCONTINUED | OUTPATIENT
Start: 2023-08-16 | End: 2023-08-16

## 2023-08-16 RX ORDER — CYCLOSPORINE 0.5 MG/ML
1 EMULSION OPHTHALMIC DAILY PRN
Status: DISCONTINUED | OUTPATIENT
Start: 2023-08-16 | End: 2023-08-18 | Stop reason: HOSPADM

## 2023-08-16 RX ORDER — ENOXAPARIN SODIUM 100 MG/ML
40 INJECTION SUBCUTANEOUS DAILY
Status: DISCONTINUED | OUTPATIENT
Start: 2023-08-16 | End: 2023-08-18 | Stop reason: HOSPADM

## 2023-08-16 RX ORDER — BUDESONIDE AND FORMOTEROL FUMARATE DIHYDRATE 160; 4.5 UG/1; UG/1
2 AEROSOL RESPIRATORY (INHALATION)
Status: DISCONTINUED | OUTPATIENT
Start: 2023-08-16 | End: 2023-08-16

## 2023-08-16 RX ORDER — HYDROCHLOROTHIAZIDE 25 MG/1
25 TABLET ORAL
Status: DISCONTINUED | OUTPATIENT
Start: 2023-08-16 | End: 2023-08-18 | Stop reason: HOSPADM

## 2023-08-16 RX ORDER — ASPIRIN 81 MG/1
324 TABLET, CHEWABLE ORAL DAILY
Status: DISCONTINUED | OUTPATIENT
Start: 2023-08-16 | End: 2023-08-18 | Stop reason: HOSPADM

## 2023-08-16 RX ORDER — SODIUM CHLORIDE 0.9 % (FLUSH) 0.9 %
10 SYRINGE (ML) INJECTION AS NEEDED
Status: DISCONTINUED | OUTPATIENT
Start: 2023-08-16 | End: 2023-08-18 | Stop reason: HOSPADM

## 2023-08-16 RX ORDER — CYCLOBENZAPRINE HCL 10 MG
10 TABLET ORAL 3 TIMES DAILY PRN
Status: DISCONTINUED | OUTPATIENT
Start: 2023-08-16 | End: 2023-08-16

## 2023-08-16 RX ORDER — IPRATROPIUM BROMIDE AND ALBUTEROL SULFATE 2.5; .5 MG/3ML; MG/3ML
3 SOLUTION RESPIRATORY (INHALATION) EVERY 4 HOURS PRN
Status: DISCONTINUED | OUTPATIENT
Start: 2023-08-16 | End: 2023-08-16

## 2023-08-16 RX ORDER — HYDRALAZINE HYDROCHLORIDE 25 MG/1
50 TABLET, FILM COATED ORAL 3 TIMES DAILY
Status: DISCONTINUED | OUTPATIENT
Start: 2023-08-16 | End: 2023-08-18 | Stop reason: HOSPADM

## 2023-08-16 RX ORDER — LORAZEPAM 2 MG/ML
1 INJECTION INTRAMUSCULAR ONCE
Status: COMPLETED | OUTPATIENT
Start: 2023-08-16 | End: 2023-08-16

## 2023-08-16 RX ORDER — BACLOFEN 10 MG/1
10 TABLET ORAL 3 TIMES DAILY
COMMUNITY

## 2023-08-16 RX ORDER — ATORVASTATIN CALCIUM 20 MG/1
20 TABLET, FILM COATED ORAL NIGHTLY
Status: DISCONTINUED | OUTPATIENT
Start: 2023-08-16 | End: 2023-08-17

## 2023-08-16 RX ORDER — BUDESONIDE 0.5 MG/2ML
0.5 INHALANT ORAL 2 TIMES DAILY
Status: DISCONTINUED | OUTPATIENT
Start: 2023-08-16 | End: 2023-08-16

## 2023-08-16 RX ORDER — POTASSIUM CHLORIDE 20 MEQ/1
10 TABLET, EXTENDED RELEASE ORAL DAILY
Status: DISCONTINUED | OUTPATIENT
Start: 2023-08-16 | End: 2023-08-16

## 2023-08-16 RX ORDER — SODIUM CHLORIDE 9 MG/ML
75 INJECTION, SOLUTION INTRAVENOUS CONTINUOUS
Status: DISCONTINUED | OUTPATIENT
Start: 2023-08-16 | End: 2023-08-18 | Stop reason: HOSPADM

## 2023-08-16 RX ORDER — PANTOPRAZOLE SODIUM 40 MG/1
40 TABLET, DELAYED RELEASE ORAL
Status: DISCONTINUED | OUTPATIENT
Start: 2023-08-17 | End: 2023-08-16

## 2023-08-16 RX ORDER — ASPIRIN 81 MG/1
81 TABLET, CHEWABLE ORAL DAILY
Status: DISCONTINUED | OUTPATIENT
Start: 2023-08-16 | End: 2023-08-16

## 2023-08-16 RX ORDER — ATORVASTATIN CALCIUM 40 MG/1
80 TABLET, FILM COATED ORAL NIGHTLY
Status: DISCONTINUED | OUTPATIENT
Start: 2023-08-16 | End: 2023-08-16

## 2023-08-16 RX ORDER — METOPROLOL SUCCINATE 50 MG/1
100 TABLET, EXTENDED RELEASE ORAL DAILY
Status: DISCONTINUED | OUTPATIENT
Start: 2023-08-16 | End: 2023-08-18

## 2023-08-16 RX ORDER — ASPIRIN 300 MG/1
300 SUPPOSITORY RECTAL DAILY
Status: DISCONTINUED | OUTPATIENT
Start: 2023-08-16 | End: 2023-08-16

## 2023-08-16 RX ORDER — ASPIRIN 300 MG/1
300 SUPPOSITORY RECTAL DAILY
Status: DISCONTINUED | OUTPATIENT
Start: 2023-08-16 | End: 2023-08-18 | Stop reason: HOSPADM

## 2023-08-16 RX ADMIN — ATORVASTATIN CALCIUM 20 MG: 20 TABLET, FILM COATED ORAL at 22:48

## 2023-08-16 RX ADMIN — ENOXAPARIN SODIUM 40 MG: 100 INJECTION SUBCUTANEOUS at 15:16

## 2023-08-16 RX ADMIN — ASPIRIN 81 MG CHEWABLE TABLET 324 MG: 81 TABLET CHEWABLE at 12:51

## 2023-08-16 RX ADMIN — SODIUM CHLORIDE 75 ML/HR: 9 INJECTION, SOLUTION INTRAVENOUS at 15:17

## 2023-08-16 RX ADMIN — Medication 10 ML: at 22:51

## 2023-08-16 RX ADMIN — HYDROCODONE BITARTRATE AND ACETAMINOPHEN 1 TABLET: 5; 325 TABLET ORAL at 22:50

## 2023-08-16 RX ADMIN — LORAZEPAM 1 MG: 2 INJECTION INTRAMUSCULAR; INTRAVENOUS at 15:07

## 2023-08-16 RX ADMIN — IOPAMIDOL 100 ML: 755 INJECTION, SOLUTION INTRAVENOUS at 12:41

## 2023-08-16 RX ADMIN — GADOTERIDOL 17 ML: 279.3 INJECTION, SOLUTION INTRAVENOUS at 17:46

## 2023-08-16 RX ADMIN — HYDRALAZINE HYDROCHLORIDE 50 MG: 25 TABLET, FILM COATED ORAL at 22:49

## 2023-08-16 RX ADMIN — MONTELUKAST 10 MG: 10 TABLET, FILM COATED ORAL at 22:49

## 2023-08-16 NOTE — CONSULTS
Primary Care Provider: Provider, No Known     Consult requested by:  Dr. Rg     Reason for Consultation: Neurological evaluation, code stroke     History taken from: patient chart RN    Chief complaint: word finding difficulty        SUBJECTIVE:    History of present illness: Lori Sutton is a 73 y.o. year old female who presented to [facility] ED after having word-finding difficulty this AM. Last known well 1030, woke up at 1130 with difficulty with her words. Code stroke called on arrival. Patient evaluated immediately in CT scanner by myself and Dr. Higuera. She was oriented x 3, able to name familiar objects. No drift b/l, no facial asymmetry and no vision changes.     Chart reviewed- patient had CT and work up on 8/9 at [facility] for syncope and CVA work up. She had recent  C5 anterior cervical corpectomy, C4-C6 anterior cervical fixation on 8/2 with Dr. Mcgowan and still has c-collar in place.     CT head reviewed and negative. CTA did not show a LVO. Pt not a TNK candidate due to resolution of symptoms are recent cervical spine surgery. Will recommend  mg now and to admit for stroke work up .       Review of Systems   Constitutional: Negative.    HENT: Negative.     Eyes:  Negative for visual disturbance.   Respiratory: Negative.     Cardiovascular: Negative.    Gastrointestinal:  Negative for diarrhea, nausea and vomiting.   Endocrine: Negative.    Genitourinary: Negative.    Musculoskeletal: Negative.    Skin: Negative.    Allergic/Immunologic: Negative.    Neurological:  Positive for speech difficulty. Negative for dizziness, tremors, seizures, syncope, facial asymmetry, weakness, light-headedness, numbness and headaches.   Psychiatric/Behavioral:  Negative for confusion.         PATIENT HISTORY:  Past Medical History:   Diagnosis Date    Anxiety     Arthritis     Asthma     Constipation     COPD (chronic obstructive pulmonary disease)     DDD (degenerative disc disease), cervical     DDD  (degenerative disc disease), lumbar     Disease of thyroid gland     Emphysema lung     Fibrocystic breast     Hard of hearing     wears hearing aids bilat    Hyperlipidemia     Hypertension     Lymphedema of both lower extremities     red and irritable    PONV (postoperative nausea and vomiting)     Seasonal allergies     Urinary incontinence    ,   Past Surgical History:   Procedure Laterality Date    APPENDECTOMY      AUGMENTATION MAMMAPLASTY      BREAST CYST ASPIRATION      BREAST CYST EXCISION      CERVICAL FUSION Right 2023    Procedure: DAY 1 -CERVICAL CORPECTOMY  LEVEL CERVICAL 5 WITH FUSION LEVELS CERVICAL 4-6 ANTERIOR;  Surgeon: Wilber Mcgowan MD;  Location: [facility] MAIN OR;  Service: Neurosurgery;  Laterality: Right;    CERVICAL FUSION Right 2023    Procedure: DAY 2: CERVICAL 4-6 LAMINECTOMY & POSTERIOR LATERAL FUSION WITH INSTRUMENTATION 3-4 LEVELS;  Surgeon: Wilber Mcgowan MD;  Location: [facility] MAIN OR;  Service: Neurosurgery;  Laterality: Right;    HYSTERECTOMY      THYROIDECTOMY, PARTIAL      TONSILLECTOMY     ,   Family History   Problem Relation Age of Onset    Pancreatic cancer Mother     Stroke Father     Stroke Sister     Prostate cancer Brother    ,   Social History     Tobacco Use    Smoking status: Former     Packs/day: 1.50     Years: 40.00     Pack years: 60.00     Types: Cigarettes     Start date: 1965     Quit date: 2006     Years since quittin.6    Smokeless tobacco: Never   Vaping Use    Vaping Use: Never used   Substance Use Topics    Alcohol use: Yes     Alcohol/week: 11.0 standard drinks     Types: 4 Glasses of wine, 7 Shots of liquor per week     Comment: daily, vodka tonic    Drug use: No   ,   Prior to Admission medications    Medication Sig Start Date End Date Taking? Authorizing Provider   ALBUTEROL SULFATE  (90 Base) MCG/ACT inhaler INHALE 2 PUFFS BY MOUTH EVERY 4 HOURS AS NEEDED FOR WHEEZING 20   Ayaz Luna MD   allopurinol  (ZYLOPRIM) 300 MG tablet TAKE 1 TABLET BY MOUTH  DAILY 7/2/23   Ayaz Luna MD   atorvastatin (LIPITOR) 20 MG tablet TAKE 1 TABLET BY MOUTH ONCE DAILY 7/2/23   Ayaz Luna MD   budesonide (Pulmicort) 0.5 MG/2ML nebulizer solution Take 2 mL by nebulization 2 (Two) Times a Day for 30 days. 7/21/23 8/20/23  Ayaz Luna MD   Cholecalciferol 1000 units tablet Take 1 tablet by mouth Daily. 3/14/12   Vincent Pang MD   citalopram (CeleXA) 20 MG tablet TAKE 1 TABLET BY MOUTH  DAILY 7/2/23   Ayaz Luna MD   cyclobenzaprine (FLEXERIL) 10 MG tablet Take 1 tablet by mouth 3 (Three) Times a Day As Needed for Muscle Spasms. 8/8/23   Wilber Mcgowan MD   furosemide (LASIX) 40 MG tablet Take 1 tablet by mouth Daily.    ProviderVincent MD   hydrALAZINE (APRESOLINE) 50 MG tablet TAKE 1 TABLET BY MOUTH 3  TIMES DAILY 7/2/23   Ayaz Luna MD   ipratropium-albuterol (DUO-NEB) 0.5-2.5 mg/3 ml nebulizer Take 3 mL by nebulization Every 4 (Four) Hours As Needed for Wheezing or Shortness of Air for up to 60 doses. 7/21/23   Ayaz Luna MD   levothyroxine (SYNTHROID, LEVOTHROID) 25 MCG tablet TAKE 1 TABLET BY MOUTH  DAILY 3/3/23   Ayaz Luna MD   magnesium hydroxide (MILK OF MAGNESIA) 2400 MG/10ML suspension suspension Take 10 mL by mouth Daily As Needed (constipation). 8/4/23   Ramy Ruiz MD   metoprolol succinate XL (TOPROL-XL) 100 MG 24 hr tablet TAKE 1 TABLET BY MOUTH  DAILY 1/19/23   Ayaz Luna MD   montelukast (SINGULAIR) 10 MG tablet TAKE 1 TABLET BY MOUTH  DAILY 7/2/23   Ayaz Luna MD   Omega-3 Fatty Acids (fish oil) 1000 MG capsule capsule Take 1 capsule by mouth Daily With Breakfast.    Vincent Pang MD   omeprazole (priLOSEC) 40 MG capsule Take 1 capsule by mouth Daily As Needed.    Provider, MD Vincent   omeprazole (priLOSEC) 40 MG capsule Take 1 capsule by mouth Daily. 8/4/23   Ramy Ruiz MD   potassium  chloride (K-DUR,KLOR-CON) 10 MEQ CR tablet Take 1 tablet by mouth Daily. 1/11/23   Vincent Pang MD   Restasis 0.05 % ophthalmic emulsion Administer 1 drop to both eyes Daily As Needed. 6/28/23   Vincent Pang MD   sennosides-docusate (PERICOLACE) 8.6-50 MG per tablet Take 1 tablet by mouth 2 (Two) Times a Day. 8/4/23   Ramy Ruiz MD   Symbicort 80-4.5 MCG/ACT inhaler INHALE 2 INHALATIONS BY  MOUTH TWICE DAILY 11/9/22   Ayaz Luna MD   triamcinolone (KENALOG) 0.5 % ointment APPLY TOPICALLY TO THE  APPROPRIATE AREA AS  DIRECTED TWICE DAILY 10/20/22   Ayaz Luna MD   valsartan-hydrochlorothiazide (DIOVAN-HCT) 320-25 MG per tablet Take 1 tablet by mouth Daily. 8/4/23   Ramy Ruiz MD   vitamin E 100 UNIT capsule Take 1 capsule by mouth Daily. 3/14/12   Vincent Pang MD    Allergies:  Nsaids and Lisinopril    Current Facility-Administered Medications   Medication Dose Route Frequency Provider Last Rate Last Admin    aspirin chewable tablet 324 mg  324 mg Oral Daily Shiloh Gomez APRN   324 mg at 08/16/23 1251    Or    aspirin suppository 300 mg  300 mg Rectal Daily Shiloh Gomez APRN        sodium chloride 0.9 % flush 10 mL  10 mL Intravenous PRN Can Rg,          Current Outpatient Medications   Medication Sig Dispense Refill    ALBUTEROL SULFATE  (90 Base) MCG/ACT inhaler INHALE 2 PUFFS BY MOUTH EVERY 4 HOURS AS NEEDED FOR WHEEZING 42.5 g 2    allopurinol (ZYLOPRIM) 300 MG tablet TAKE 1 TABLET BY MOUTH  DAILY 90 tablet 3    atorvastatin (LIPITOR) 20 MG tablet TAKE 1 TABLET BY MOUTH ONCE DAILY 90 tablet 3    budesonide (Pulmicort) 0.5 MG/2ML nebulizer solution Take 2 mL by nebulization 2 (Two) Times a Day for 30 days. 120 mL 6    Cholecalciferol 1000 units tablet Take 1 tablet by mouth Daily.      citalopram (CeleXA) 20 MG tablet TAKE 1 TABLET BY MOUTH  DAILY 90 tablet 3    cyclobenzaprine (FLEXERIL) 10 MG tablet Take 1  tablet by mouth 3 (Three) Times a Day As Needed for Muscle Spasms. 60 tablet 1    furosemide (LASIX) 40 MG tablet Take 1 tablet by mouth Daily.      hydrALAZINE (APRESOLINE) 50 MG tablet TAKE 1 TABLET BY MOUTH 3  TIMES DAILY 270 tablet 3    ipratropium-albuterol (DUO-NEB) 0.5-2.5 mg/3 ml nebulizer Take 3 mL by nebulization Every 4 (Four) Hours As Needed for Wheezing or Shortness of Air for up to 60 doses. 360 mL 2    levothyroxine (SYNTHROID, LEVOTHROID) 25 MCG tablet TAKE 1 TABLET BY MOUTH  DAILY 90 tablet 3    magnesium hydroxide (MILK OF MAGNESIA) 2400 MG/10ML suspension suspension Take 10 mL by mouth Daily As Needed (constipation). 300 mL 0    metoprolol succinate XL (TOPROL-XL) 100 MG 24 hr tablet TAKE 1 TABLET BY MOUTH  DAILY 90 tablet 3    montelukast (SINGULAIR) 10 MG tablet TAKE 1 TABLET BY MOUTH  DAILY 90 tablet 3    Omega-3 Fatty Acids (fish oil) 1000 MG capsule capsule Take 1 capsule by mouth Daily With Breakfast.      omeprazole (priLOSEC) 40 MG capsule Take 1 capsule by mouth Daily As Needed.      omeprazole (priLOSEC) 40 MG capsule Take 1 capsule by mouth Daily. 90 capsule 3    potassium chloride (K-DUR,KLOR-CON) 10 MEQ CR tablet Take 1 tablet by mouth Daily.      Restasis 0.05 % ophthalmic emulsion Administer 1 drop to both eyes Daily As Needed.      sennosides-docusate (PERICOLACE) 8.6-50 MG per tablet Take 1 tablet by mouth 2 (Two) Times a Day. 30 tablet 0    Symbicort 80-4.5 MCG/ACT inhaler INHALE 2 INHALATIONS BY  MOUTH TWICE DAILY 10.2 g 11    triamcinolone (KENALOG) 0.5 % ointment APPLY TOPICALLY TO THE  APPROPRIATE AREA AS  DIRECTED TWICE DAILY 90 g 1    valsartan-hydrochlorothiazide (DIOVAN-HCT) 320-25 MG per tablet Take 1 tablet by mouth Daily. 90 tablet 3    vitamin E 100 UNIT capsule Take 1 capsule by mouth Daily.          ________________________________________________________        OBJECTIVE:    PHYSICAL EXAM:    Constitutional: The patient is in no apparent distress, awake and alert.  There is no shortness of breath.   PSYCHIATRIC: Mood/affect normal, judgement normal, appropriate  HEENT:  Normocephalic, atraumatic. Cervical collar intact.   Chest: Breathing unlabored  Cardiac: Regular rate and rhythm.   Extremities:  No clubbing, cyanosis or edema.    NEUROLOGICAL:    Cognition:   Fully oriented.  Fund of knowledge decent .  Language normal with normal comprehension, fluent speech, intact repetition and naming.     Cranial nerves;    II - pupils bilaterally equal reacting to light,  No new Visual field deficits;  Fundoscopic exam- Not able to be done, non-dilated exam  III,IV,VI: EOMI with no diplopia  V: Normal facial sensations  VII: No facial asymmetry,  VIII: No New hearing abnormality  IX, X, XI: normal gag and shoulder shrug;  XII: tongue is in the midline.    Sensory:  Intact to light touch in all extremities.     Motor: Strength 5/5 bilaterally upper and lower extremities. No involuntary movements present. Normal tone and bulk.    Cerebellar: Finger to nose and mirror movements normal bilaterally.  Gait and balance: Deferred.     Physical exam performed by ROLANDO Prasad.  ________________________________________________________   RESULTS REVIEW:    VITAL SIGNS:   Temp:  [97.8 øF (36.6 øC)] 97.8 øF (36.6 øC)  Heart Rate:  [63-65] 63  Resp:  [18] 18  BP: (117-126)/(59-64) 126/64     LABS:      Lab 08/16/23  1233   WBC 8.70   HEMOGLOBIN 12.2   HEMATOCRIT 35.5   PLATELETS 414   NEUTROS ABS 6.60   LYMPHS ABS 0.90   MONOS ABS 1.00*   EOS ABS 0.20   MCV 99.1*   PROTIME 10.9   APTT 25.0         Lab 08/16/23  1233   SODIUM 130*   POTASSIUM 4.1   CHLORIDE 88*   CO2 32.0*   ANION GAP 10.0   BUN 22   CREATININE 0.95   EGFR 62.6   GLUCOSE 118*   CALCIUM 9.6   TSH 3.280         Lab 08/16/23  1233   TOTAL PROTEIN 6.8   ALBUMIN 3.9   GLOBULIN 2.9   ALT (SGPT) 16   AST (SGOT) 19   BILIRUBIN 0.3   ALK PHOS 98         Lab 08/16/23  1233   HSTROP T 20*   PROTIME 10.9   INR 1.02                 UA           9/7/2022    11:50 4/24/2023    15:58   Urinalysis   Squamous Epithelial Cells, UA 0-2     Specific Gravity, UA 1.013  1.018    Ketones, UA Negative  Trace    Blood, UA Negative  Negative    Leukocytes, UA Trace  Negative    Nitrite, UA Negative  Negative    RBC, UA 0-2     WBC, UA 0-2     Bacteria, UA None Seen         Lab Results   Component Value Date    TSH 3.280 08/16/2023    LDL 86 09/07/2022    HGBA1C 5.6 09/07/2022    [id number] >2,000 (H) 03/02/2022       IMAGING STUDIES:  CT Angiogram Neck    Result Date: 8/16/2023  1.No acute abnormality is identified within the large arteries of the head or neck. 2.Atherosclerotic plaque within the left carotid bifurcation and bilateral carotid siphons. No significant ICA stenosis is identified. Electronically Signed: Abiodun Dawson MD  8/16/2023 12:49 PM EDT  Workstation ID: [id number]    CT Angiogram Head w AI Analysis of LVO    Result Date: 8/16/2023  1.No acute abnormality is identified within the large arteries of the head or neck. 2.Atherosclerotic plaque within the left carotid bifurcation and bilateral carotid siphons. No significant ICA stenosis is identified. Electronically Signed: Abiodun aDwson MD  8/16/2023 12:49 PM EDT  Workstation ID: [id number]     I reviewed the patient's new clinical results.    ________________________________________________________     PROBLEM LIST:    * No active hospital problems. *            ASSESSMENT/PLAN:    Word finding difficulty, possible expressive aphasia. Symptoms now resolved.  Stroke work up pending   - CT head reviewed and negative for hemorrhage/mass.   - CTA head and neck: No significant stenosis of the head or neck  - Echo (8/10) EF 58% no thrombus   - EKG: SR rate 66   - Labs: A1C: 5.8, B12: P, LDL: 88,TSH: 3.280  - Antithrombotics:    - Statin: Lipitor 40 mg   - PT/OT/ST as appropriate, Neuro checks per protocol, DVT prophylaxis, Stroke education      Modification of stroke risk factors:   - Blood  pressure should be less than 130/80 outpatient, HbA1c less than 6.5, LDL less than 70; b12>500 and smoking cessation if applicable. We would be grateful if the primary team / primary care physician would keep a close watch on the above targets.  - Stroke education  - Follow up with neurologist of choice    Will follow.       I discussed the patient's findings and my recommendations with patient and nursing staff    BEATRICE Damon  08/16/23  13:09 EDT

## 2023-08-16 NOTE — SIGNIFICANT NOTE
08/16/23 1428   OTHER   Discipline physical therapist   Rehab Time/Intention   Session Not Performed unable to evaluate, medical status change  (Currently holding PT eval until pt more medically stable and more suitable for evaluation. Likely d/c back to [facility] to finish rehab. PT will follow-up tomorrow, 8/17/23.)   Therapy Assessment/Plan (PT)   Criteria for Skilled Interventions Met (PT) yes;meets criteria   Recommendation   PT - Next Appointment 08/17/23

## 2023-08-16 NOTE — Clinical Note
Level of Care: Med/Surg [1]   Admitting Physician: TADEO PAINTING [[id number]]   Attending Physician: TADEO PAINTING [[id number]]   Bed Request Comments: wyatt

## 2023-08-16 NOTE — ED PROVIDER NOTES
Subjective   History of Present Illness  Chief complaint: Last normal 10:30 AM.  She went to PT at her facility today.  She began having difficulty with word finding.  She is improving at this point time.  She states she feels much better than she did.  She was found 1115 to be having these difficulties.  No other focal findings.  She does note some vision changes that happened 5 days ago.    Context:    Duration: As above    Timing:    Severity:    Associated Symptoms:        PCP:  LMP:    Review of Systems   Constitutional:  Negative for fever.   Eyes:  Positive for visual disturbance.   Respiratory: Negative.     Cardiovascular: Negative.    Gastrointestinal: Negative.    Neurological:  Positive for speech difficulty.     Past Medical History:   Diagnosis Date    Anxiety     Arthritis     Asthma     Constipation     COPD (chronic obstructive pulmonary disease)     DDD (degenerative disc disease), cervical     DDD (degenerative disc disease), lumbar     Disease of thyroid gland     Emphysema lung     Fibrocystic breast     Hard of hearing     wears hearing aids bilat    Hyperlipidemia     Hypertension     Lymphedema of both lower extremities     red and irritable    PONV (postoperative nausea and vomiting)     Seasonal allergies     Urinary incontinence        Allergies   Allergen Reactions    Nsaids GI Bleeding    Lisinopril Cough       Past Surgical History:   Procedure Laterality Date    APPENDECTOMY  1958    AUGMENTATION MAMMAPLASTY      BREAST CYST ASPIRATION      BREAST CYST EXCISION      CERVICAL FUSION Right 7/31/2023    Procedure: DAY 1 -CERVICAL CORPECTOMY  LEVEL CERVICAL 5 WITH FUSION LEVELS CERVICAL 4-6 ANTERIOR;  Surgeon: Wilber Mcgowan MD;  Location: [facility] MAIN OR;  Service: Neurosurgery;  Laterality: Right;    CERVICAL FUSION Right 8/2/2023    Procedure: DAY 2: CERVICAL 4-6 LAMINECTOMY & POSTERIOR LATERAL FUSION WITH INSTRUMENTATION 3-4 LEVELS;  Surgeon: Wilber Mcgowan MD;  Location: [facility]  MAIN OR;  Service: Neurosurgery;  Laterality: Right;    HYSTERECTOMY      THYROIDECTOMY, PARTIAL      TONSILLECTOMY         Family History   Problem Relation Age of Onset    Pancreatic cancer Mother     Stroke Father     Stroke Sister     Prostate cancer Brother        Social History     Socioeconomic History    Marital status:    Tobacco Use    Smoking status: Former     Packs/day: 1.50     Years: 40.00     Pack years: 60.00     Types: Cigarettes     Start date: 1965     Quit date: 2006     Years since quittin.6    Smokeless tobacco: Never   Vaping Use    Vaping Use: Never used   Substance and Sexual Activity    Alcohol use: Yes     Alcohol/week: 11.0 standard drinks     Types: 4 Glasses of wine, 7 Shots of liquor per week     Comment: daily, vodka tonic    Drug use: No    Sexual activity: Defer     Birth control/protection: Post-menopausal           Objective   Physical Exam  Vitals and nursing note reviewed.   Constitutional:       Appearance: Normal appearance.   HENT:      Head: Normocephalic and atraumatic.   Eyes:      Extraocular Movements: Extraocular movements intact.      Pupils: Pupils are equal, round, and reactive to light.   Cardiovascular:      Rate and Rhythm: Normal rate and regular rhythm.      Pulses: Normal pulses.      Heart sounds: Normal heart sounds.   Pulmonary:      Effort: Pulmonary effort is normal.      Breath sounds: Normal breath sounds.   Abdominal:      Tenderness: There is no abdominal tenderness.   Musculoskeletal:      Cervical back: Normal range of motion and neck supple.   Neurological:      General: No focal deficit present.      Mental Status: She is alert.      Sensory: No sensory deficit.      Motor: No weakness.      Coordination: Coordination normal.      Comments: She is slow to get things out on specific occasions.  Searching for the worse but is able to find them now.   Psychiatric:         Mood and Affect: Mood normal.         Thought Content:  Thought content normal.       Procedures           ED Course  ED Course as of 08/16/23 1225   Wed Aug 16, 2023   1224 Discussed with .  Patient with rapidly improving symptoms.  Low NIH of 1.  Not a tPA candidate.  Do not need perfusion study [LH]      ED Course User Index  [LH] Can Rgean,            Results for orders placed or performed during the hospital encounter of 08/16/23   Comprehensive Metabolic Panel    Specimen: Blood   Result Value Ref Range    Glucose 118 (H) 65 - 99 mg/dL    BUN 22 8 - 23 mg/dL    Creatinine 0.95 0.57 - 1.00 mg/dL    Sodium 130 (L) 136 - 145 mmol/L    Potassium 4.1 3.5 - 5.2 mmol/L    Chloride 88 (L) 98 - 107 mmol/L    CO2 32.0 (H) 22.0 - 29.0 mmol/L    Calcium 9.6 8.6 - 10.5 mg/dL    Total Protein 6.8 6.0 - 8.5 g/dL    Albumin 3.9 3.5 - 5.2 g/dL    ALT (SGPT) 16 1 - 33 U/L    AST (SGOT) 19 1 - 32 U/L    Alkaline Phosphatase 98 39 - 117 U/L    Total Bilirubin 0.3 0.0 - 1.2 mg/dL    Globulin 2.9 gm/dL    A/G Ratio 1.3 g/dL    BUN/Creatinine Ratio 23.2 7.0 - 25.0    Anion Gap 10.0 5.0 - 15.0 mmol/L    eGFR 62.6 >60.0 mL/min/1.73   Protime-INR    Specimen: Blood   Result Value Ref Range    Protime 10.9 9.6 - 11.7 Seconds    INR 1.02 0.93 - 1.10   aPTT    Specimen: Blood   Result Value Ref Range    PTT 25.0 24.0 - 31.0 seconds   Single High Sensitivity Troponin T    Specimen: Blood   Result Value Ref Range    HS Troponin T 20 (H) <10 ng/L   CBC Auto Differential    Specimen: Blood   Result Value Ref Range    WBC 8.70 3.40 - 10.80 10*3/mm3    RBC 3.59 (L) 3.77 - 5.28 10*6/mm3    Hemoglobin 12.2 12.0 - 15.9 g/dL    Hematocrit 35.5 34.0 - 46.6 %    MCV 99.1 (H) 79.0 - 97.0 fL    MCH 34.0 (H) 26.6 - 33.0 pg    MCHC 34.3 31.5 - 35.7 g/dL    RDW 12.9 12.3 - 15.4 %    RDW-SD 46.8 37.0 - 54.0 fl    MPV 6.6 6.0 - 12.0 fL    Platelets 414 140 - 450 10*3/mm3    Neutrophil % 75.3 42.7 - 76.0 %    Lymphocyte % 10.1 (L) 19.6 - 45.3 %    Monocyte % 11.2 5.0 - 12.0 %    Eosinophil %  2.8 0.3 - 6.2 %    Basophil % 0.6 0.0 - 1.5 %    Neutrophils, Absolute 6.60 1.70 - 7.00 10*3/mm3    Lymphocytes, Absolute 0.90 0.70 - 3.10 10*3/mm3    Monocytes, Absolute 1.00 (H) 0.10 - 0.90 10*3/mm3    Eosinophils, Absolute 0.20 0.00 - 0.40 10*3/mm3    Basophils, Absolute 0.10 0.00 - 0.20 10*3/mm3    nRBC 0.0 0.0 - 0.2 /100 WBC   Hemoglobin A1c    Specimen: Blood   Result Value Ref Range    Hemoglobin A1C 5.80 (H) 4.80 - 5.60 %   TSH    Specimen: Blood   Result Value Ref Range    TSH 3.280 0.270 - 4.200 uIU/mL   ECG 12 Lead Stroke Evaluation   Result Value Ref Range    QT Interval 430 ms   Type & Screen    Specimen: Blood   Result Value Ref Range    ABO Type O     RH type Positive    Green Top (Gel)   Result Value Ref Range    Extra Tube hide             CT Angiogram Neck    Result Date: 8/16/2023  1.No acute abnormality is identified within the large arteries of the head or neck. 2.Atherosclerotic plaque within the left carotid bifurcation and bilateral carotid siphons. No significant ICA stenosis is identified. Electronically Signed: Abiodun Dawson MD  8/16/2023 12:49 PM EDT  Workstation ID: [id number]    CT Angiogram Head w AI Analysis of LVO    Result Date: 8/16/2023  1.No acute abnormality is identified within the large arteries of the head or neck. 2.Atherosclerotic plaque within the left carotid bifurcation and bilateral carotid siphons. No significant ICA stenosis is identified. Electronically Signed: Abiodun Dawson MD  8/16/2023 12:49 PM EDT  Workstation ID: [id number]                              Medical Decision Making  Patient was seen for the above speech difficulties    Differential diagnosis includes was not limited to TIA/CVA/intracerebral hemorrhage    CT scan noncontrast does not show any signs of hemorrhage.  Patient is not on blood thinners.  She describes word finding difficulty.  It had improved significantly by the time she arrived.  She paused on some things but was able to verbalize  everything.  She was seen by the stroke team.  Stroke protocol was initiated with the symptoms being sudden onset nature.   did evaluate the patient.  Recommended no tPA.  CT angiogram shows no LVO.  Patient did receive orders for aspirin and further work-up pending per the stroke team.  Critical care time is 35 minutes.  Discussed with Dr. Solo of the hospitalist staff agrees admit for further neurologic work-up.    Problems Addressed:  TIA (transient ischemic attack): complicated acute illness or injury    Amount and/or Complexity of Data Reviewed  Labs: ordered. Decision-making details documented in ED Course.     Details: Reviewed by myself  Radiology: ordered and independent interpretation performed.     Details: Reviewed by myself  ECG/medicine tests: ordered and independent interpretation performed.     Details: EKG interpreted by myself shows sinus rhythm rate of 66 LVH inferior Q waves.  Compared to prior EKG dated 7/19/2023 showing sinus rhythm rate of 87 with left atrial lodgment LVH.  Discussion of management or test interpretation with external provider(s): As above    Risk  Prescription drug management.  Decision regarding hospitalization.    Critical Care  Total time providing critical care: 35 minutes      Final diagnoses:   None   TIA    ED Disposition  ED Disposition       None            No follow-up provider specified.       Medication List      No changes were made to your prescriptions during this visit.            Can Rg,   08/16/23 3444

## 2023-08-16 NOTE — PROGRESS NOTES
I saw this patient per request of family and also Dr. Solo was wanting to know if patient is okay for MRI given previous cervical surgery with instrumentation with Dr. Mcgowan.  The patient is fine for MRI.  Furthermore, there does not appear to be anything neurosurgical at this time upon review of  CT head without contrast/CT angiogram of the head and neck obtained today 8/16/2023.  The patient is being worked up for stroke.  Please call neurosurgery for any further questions or concerns.

## 2023-08-16 NOTE — PLAN OF CARE
Problem: Fall Injury Risk  Goal: Absence of Fall and Fall-Related Injury  Outcome: Ongoing, Progressing  Intervention: Promote Injury-Free Environment  Recent Flowsheet Documentation  Taken 8/16/2023 1635 by Jany Mcconnell RN  Safety Promotion/Fall Prevention: (MRI)   patient off unit   other (see comments)     Problem: Adjustment to Illness (Stroke, Ischemic/Transient Ischemic Attack)  Goal: Optimal Coping  Outcome: Ongoing, Progressing     Problem: Bowel Elimination Impaired (Stroke, Ischemic/Transient Ischemic Attack)  Goal: Effective Bowel Elimination  Outcome: Ongoing, Progressing     Problem: Cerebral Tissue Perfusion (Stroke, Ischemic/Transient Ischemic Attack)  Goal: Optimal Cerebral Tissue Perfusion  Outcome: Ongoing, Progressing     Problem: Respiratory Compromise (Stroke, Ischemic/Transient Ischemic Attack)  Goal: Effective Oxygenation and Ventilation  Outcome: Ongoing, Progressing   Goal Outcome Evaluation:

## 2023-08-16 NOTE — H&P
"    [facility] Medicine Services  History & Physical    Patient Name: Lori Sutton  : 1950  MRN: [id number]  Primary Care Physician:  Ayaz Luna MD  Date of admission: 2023  Date and Time of Service: 2023 at 1400.    Subjective      Chief Complaint: Speech problems and difficulty word finding today.    History of Present Illness: Lori Sutton is a 73 y.o. female with with hypertension, asthma/COPD, hypothyroidism and recent C-spine surgery by Dr. Mcgowan for possible is spinal stenosis was at rehab facility who presented to [facility] on 2023 complaining of his speech problems and word finding difficulty which started this morning.  Patient states that she had an unresponsive episode about a week ago where she had to be resuscitated and she has developed double vision since then.  She mainly gets double vision on closer objects.  This morning she tried to talk\" think what she wanted to say but was not able to express time.  This episode lasted for about 30 to 40 minutes and then resolved.  Her diplopia is still going on the for the last 1 week.  She denies any unilateral weakness, fever chills, cough, chest pain, shortness of breath, abdominal pain, nausea vomiting diarrhea, hematemesis, hematochezia, melena, hemoptysis, dysuria or hematuria.  By the time she arrived to the ER, her expressive aphasia was resolved and she did not receive any tPA.      Review of Systems   Constitutional: Negative for chills, fever and night sweats.   HENT:  Negative for congestion, hoarse voice and sore throat.    Eyes:  Positive for double vision. Negative for blurred vision.   Cardiovascular:  Negative for chest pain, dyspnea on exertion, irregular heartbeat, near-syncope, palpitations and syncope.   Respiratory:  Negative for cough, shortness of breath, sputum production and wheezing.    Endocrine: Negative for cold intolerance and heat intolerance.   Skin:  Negative for itching " and rash.   Musculoskeletal:  Positive for neck pain. Negative for back pain, joint swelling and muscle cramps.   Gastrointestinal:  Negative for abdominal pain, anorexia, constipation, diarrhea, hematemesis, hematochezia, melena, nausea and vomiting.   Genitourinary:  Negative for dysuria, frequency, hematuria and urgency.   Neurological:  Negative for dizziness, focal weakness, headaches, light-headedness, seizures, tremors, vertigo and weakness.        Patient has double visions for about a week and had an episode of expressive aphasia this morning which lasted for 30 to 40 minutes and resolved by the time she came to the ER.   Psychiatric/Behavioral:  Negative for altered mental status, depression, hallucinations, suicidal ideas and thoughts of violence.         Personal History     Past Medical History:   Diagnosis Date    Anxiety     Arthritis     Asthma     Constipation     COPD (chronic obstructive pulmonary disease)     DDD (degenerative disc disease), cervical     DDD (degenerative disc disease), lumbar     Disease of thyroid gland     Emphysema lung     Fibrocystic breast     Hard of hearing     wears hearing aids bilat    Hyperlipidemia     Hypertension     Lymphedema of both lower extremities     red and irritable    PONV (postoperative nausea and vomiting)     Seasonal allergies     Urinary incontinence        Past Surgical History:   Procedure Laterality Date    APPENDECTOMY  1958    AUGMENTATION MAMMAPLASTY      BREAST CYST ASPIRATION      BREAST CYST EXCISION      CERVICAL FUSION Right 7/31/2023    Procedure: DAY 1 -CERVICAL CORPECTOMY  LEVEL CERVICAL 5 WITH FUSION LEVELS CERVICAL 4-6 ANTERIOR;  Surgeon: Wilber Mcgowan MD;  Location: [facility] MAIN OR;  Service: Neurosurgery;  Laterality: Right;    CERVICAL FUSION Right 8/2/2023    Procedure: DAY 2: CERVICAL 4-6 LAMINECTOMY & POSTERIOR LATERAL FUSION WITH INSTRUMENTATION 3-4 LEVELS;  Surgeon: Wilber Mcgowan MD;  Location: [facility] MAIN OR;  Service:  Neurosurgery;  Laterality: Right;    HYSTERECTOMY      THYROIDECTOMY, PARTIAL      TONSILLECTOMY  1958       Family History: family history includes Pancreatic cancer in her mother; Prostate cancer in her brother; Stroke in her father and sister. Otherwise pertinent FHx was reviewed and not pertinent to current issue.    Social History:  reports that she quit smoking about 17 years ago. Her smoking use included cigarettes. She started smoking about 58 years ago. She has a 60.00 pack-year smoking history. She has never used smokeless tobacco. She reports current alcohol use of about 11.0 standard drinks per week. She reports that she does not use drugs.    Home Medications:  Prior to Admission Medications       Prescriptions Last Dose Informant Patient Reported? Taking?    ALBUTEROL SULFATE  (90 Base) MCG/ACT inhaler   No No    INHALE 2 PUFFS BY MOUTH EVERY 4 HOURS AS NEEDED FOR WHEEZING    allopurinol (ZYLOPRIM) 300 MG tablet   No No    TAKE 1 TABLET BY MOUTH  DAILY    atorvastatin (LIPITOR) 20 MG tablet   No No    TAKE 1 TABLET BY MOUTH ONCE DAILY    budesonide (Pulmicort) 0.5 MG/2ML nebulizer solution   No No    Take 2 mL by nebulization 2 (Two) Times a Day for 30 days.    Cholecalciferol 1000 units tablet   Yes No    Take 1 tablet by mouth Daily.    citalopram (CeleXA) 20 MG tablet   No No    TAKE 1 TABLET BY MOUTH  DAILY    cyclobenzaprine (FLEXERIL) 10 MG tablet   No No    Take 1 tablet by mouth 3 (Three) Times a Day As Needed for Muscle Spasms.    furosemide (LASIX) 40 MG tablet   Yes No    Take 1 tablet by mouth Daily.    hydrALAZINE (APRESOLINE) 50 MG tablet   No No    TAKE 1 TABLET BY MOUTH 3  TIMES DAILY    ipratropium-albuterol (DUO-NEB) 0.5-2.5 mg/3 ml nebulizer   No No    Take 3 mL by nebulization Every 4 (Four) Hours As Needed for Wheezing or Shortness of Air for up to 60 doses.    levothyroxine (SYNTHROID, LEVOTHROID) 25 MCG tablet   No No    TAKE 1 TABLET BY MOUTH  DAILY    magnesium hydroxide  (MILK OF MAGNESIA) 2400 MG/10ML suspension suspension   No No    Take 10 mL by mouth Daily As Needed (constipation).    metoprolol succinate XL (TOPROL-XL) 100 MG 24 hr tablet   No No    TAKE 1 TABLET BY MOUTH  DAILY    montelukast (SINGULAIR) 10 MG tablet   No No    TAKE 1 TABLET BY MOUTH  DAILY    Omega-3 Fatty Acids (fish oil) 1000 MG capsule capsule   Yes No    Take 1 capsule by mouth Daily With Breakfast.    omeprazole (priLOSEC) 40 MG capsule   Yes No    Take 1 capsule by mouth Daily As Needed.    omeprazole (priLOSEC) 40 MG capsule   No No    Take 1 capsule by mouth Daily.    potassium chloride (K-DUR,KLOR-CON) 10 MEQ CR tablet   Yes No    Take 1 tablet by mouth Daily.    Restasis 0.05 % ophthalmic emulsion   Yes No    Administer 1 drop to both eyes Daily As Needed.    sennosides-docusate (PERICOLACE) 8.6-50 MG per tablet   No No    Take 1 tablet by mouth 2 (Two) Times a Day.    Symbicort 80-4.5 MCG/ACT inhaler   No No    INHALE 2 INHALATIONS BY  MOUTH TWICE DAILY    triamcinolone (KENALOG) 0.5 % ointment   No No    APPLY TOPICALLY TO THE  APPROPRIATE AREA AS  DIRECTED TWICE DAILY    valsartan-hydrochlorothiazide (DIOVAN-HCT) 320-25 MG per tablet   No No    Take 1 tablet by mouth Daily.    vitamin E 100 UNIT capsule   Yes No    Take 1 capsule by mouth Daily.              Allergies:  Allergies   Allergen Reactions    Nsaids GI Bleeding    Lisinopril Cough       Objective      Vitals:   Temp:  [97.8 øF (36.6 øC)] 97.8 øF (36.6 øC)  Heart Rate:  [63-70] 70  Resp:  [18] 18  BP: (117-135)/() 128/67  Flow (L/min):  [3] 3    Physical Exam  Constitutional:       General: She is not in acute distress.  HENT:      Head: Normocephalic and atraumatic.      Mouth/Throat:      Mouth: Mucous membranes are moist.      Pharynx: Oropharynx is clear.   Eyes:      Extraocular Movements: Extraocular movements intact.   Cardiovascular:      Rate and Rhythm: Normal rate and regular rhythm.      Pulses: Normal pulses.       Heart sounds: Normal heart sounds.   Pulmonary:      Effort: Pulmonary effort is normal.      Breath sounds: Normal breath sounds.   Abdominal:      General: There is no distension.      Palpations: Abdomen is soft.      Tenderness: There is no abdominal tenderness.   Musculoskeletal:      Cervical back: Neck supple.      Right lower leg: No edema.      Left lower leg: No edema.   Skin:     General: Skin is warm and dry.   Neurological:      Mental Status: She is alert.      Cranial Nerves: No cranial nerve deficit.      Motor: No weakness.   Psychiatric:         Mood and Affect: Mood normal.         Behavior: Behavior normal.          Result Review    Result Review:  I have personally reviewed the results from the time of this admission to 8/16/2023 13:55 EDT and agree with these findings:  [x]  Laboratory  []  Microbiology  [x]  Radiology  [x]  EKG/Telemetry   []  Cardiology/Vascular   []  Pathology  []  Old records  []  Other:  Most notable findings include:   CBC shows WBC of 8.7, H&H of 12.2 and 35.5 and platelets of 414.  CMP shows sodium of 130, potassium 4.1, chloride 88, carbon dioxide 32, BUN 22, creatinine 0.95 and blood sugar of 118.  LFTs were within normal limits.  Hemoglobin A1c was 5.8.  TSH was 3.2.  Troponin was 28.  Chest x-ray showed left medial basilar airspace disease which may be related to atelectasis.  CT angiogram of head and neck showed no acute abnormality within the large arteries of the head and neck.  Atherosclerotic plaque within the left carotid bifurcation and bilateral carotid siphons no significant ICA stenosis is 95.  CT of the head showed no acute intracranial finding.  Moderate chronic microvascular disease and mild atrophy.  EKG showed sinus rhythm probable left ventricular hypertrophy.  Inferior infarct old.    Assessment & Plan        Active Hospital Problems:  Active Hospital Problems    Diagnosis     **TIA (transient ischemic attack)      Plan:     73-year-old female with  asthma/COPD, hypertension and dyslipidemia as well as hypothyroidism, with a recent C-spine surgery about 2 weeks ago comes in with diplopia for a week and had expressive aphasia today which resolved in 30 to 40 minutes.    #Expressive aphasia:  Transient episode.  Put patient in observation on telemetry.  Start aspirin.  Continue statin.  Neurology consult.  N.p.o. for now.  IV fluids with normal saline.  We will see if the patient can go for an MRI of the brain, not sure as the patient recently had C-spine surgery with possible hardware.    #Asthma COPD:  Stable at this point.  Continue home inhalers and nebulizers.    #Hypertension:  Continue blood pressure medications from home.    #Hypothyroidism:  Synthroid at home dose.    #Recent C-spine surgery:  Continue c-collar for now.  We will consult Dr. Mcgowan who did the surgery 2 weeks ago to reevaluate the patient and recommendation regarding MRI.    Patient is full code.    I have utilized all available immediate resources to obtain, update, or review the patient's current medications.  I confirmed that the patient's Advance Care Plan is present, code status is documented, or surrogate decision maker is listed in the patient's medical record.         DVT prophylaxis:  Medical and mechanical DVT prophylaxis orders are present.    CODE STATUS:    Level Of Support Discussed With: Patient  Code Status (Patient has no pulse and is not breathing): CPR (Attempt to Resuscitate)  Medical Interventions (Patient has pulse or is breathing): Full Support    Admission Status:  I believe this patient meets probation status.    I discussed the patient's findings and my recommendations with patient and family.    Signature: Electronically signed by Marcie Solo MD, 08/16/23, 13:55 EDT.  [facility] Hospitalist Team

## 2023-08-17 PROBLEM — E66.9 OBESITY (BMI 30-39.9): Chronic | Status: ACTIVE | Noted: 2023-08-17

## 2023-08-17 PROBLEM — R47.01 EXPRESSIVE APHASIA: Status: ACTIVE | Noted: 2023-08-17

## 2023-08-17 PROBLEM — H53.2 DOUBLE VISION: Status: RESOLVED | Noted: 2023-08-17 | Resolved: 2023-08-17

## 2023-08-17 PROBLEM — Z87.891 HISTORY OF CIGARETTE SMOKING: Chronic | Status: ACTIVE | Noted: 2023-08-17

## 2023-08-17 PROBLEM — Z98.890 H/O CERVICAL SPINE SURGERY: Status: ACTIVE | Noted: 2023-08-17

## 2023-08-17 PROBLEM — R47.01 EXPRESSIVE APHASIA: Status: RESOLVED | Noted: 2023-08-17 | Resolved: 2023-08-17

## 2023-08-17 PROBLEM — H53.2 DOUBLE VISION: Status: ACTIVE | Noted: 2023-08-17

## 2023-08-17 LAB
GLUCOSE BLDC GLUCOMTR-MCNC: 113 MG/DL (ref 70–105)
GLUCOSE BLDC GLUCOMTR-MCNC: 113 MG/DL (ref 70–105)
QT INTERVAL: 430 MS

## 2023-08-17 PROCEDURE — 87102 FUNGUS ISOLATION CULTURE: CPT | Performed by: FAMILY MEDICINE

## 2023-08-17 PROCEDURE — 96372 THER/PROPH/DIAG INJ SC/IM: CPT

## 2023-08-17 PROCEDURE — 97161 PT EVAL LOW COMPLEX 20 MIN: CPT

## 2023-08-17 PROCEDURE — G0378 HOSPITAL OBSERVATION PER HR: HCPCS

## 2023-08-17 PROCEDURE — 25010000002 ENOXAPARIN PER 10 MG: Performed by: INTERNAL MEDICINE

## 2023-08-17 PROCEDURE — 97166 OT EVAL MOD COMPLEX 45 MIN: CPT

## 2023-08-17 PROCEDURE — 82948 REAGENT STRIP/BLOOD GLUCOSE: CPT

## 2023-08-17 PROCEDURE — 99214 OFFICE O/P EST MOD 30 MIN: CPT | Performed by: NURSE PRACTITIONER

## 2023-08-17 RX ORDER — METOPROLOL SUCCINATE 50 MG/1
50 TABLET, EXTENDED RELEASE ORAL DAILY
Qty: 30 TABLET | Refills: 0 | Status: SHIPPED | OUTPATIENT
Start: 2023-08-17

## 2023-08-17 RX ORDER — ATORVASTATIN CALCIUM 40 MG/1
40 TABLET, FILM COATED ORAL NIGHTLY
Qty: 30 TABLET | Refills: 0
Start: 2023-08-17

## 2023-08-17 RX ORDER — ATORVASTATIN CALCIUM 40 MG/1
40 TABLET, FILM COATED ORAL NIGHTLY
Status: DISCONTINUED | OUTPATIENT
Start: 2023-08-17 | End: 2023-08-18 | Stop reason: HOSPADM

## 2023-08-17 RX ORDER — ASPIRIN 81 MG/1
81 TABLET ORAL DAILY
Qty: 30 TABLET | Refills: 0
Start: 2023-08-17

## 2023-08-17 RX ADMIN — HYDROCODONE BITARTRATE AND ACETAMINOPHEN 1 TABLET: 5; 325 TABLET ORAL at 08:59

## 2023-08-17 RX ADMIN — MONTELUKAST 10 MG: 10 TABLET, FILM COATED ORAL at 20:12

## 2023-08-17 RX ADMIN — HYDROCHLOROTHIAZIDE 25 MG: 25 TABLET ORAL at 08:36

## 2023-08-17 RX ADMIN — HYDRALAZINE HYDROCHLORIDE 50 MG: 25 TABLET, FILM COATED ORAL at 20:12

## 2023-08-17 RX ADMIN — LEVOTHYROXINE SODIUM 25 MCG: 0.03 TABLET ORAL at 05:52

## 2023-08-17 RX ADMIN — ENOXAPARIN SODIUM 40 MG: 100 INJECTION SUBCUTANEOUS at 18:43

## 2023-08-17 RX ADMIN — Medication 10 ML: at 08:36

## 2023-08-17 RX ADMIN — ASPIRIN 81 MG CHEWABLE TABLET 324 MG: 81 TABLET CHEWABLE at 08:34

## 2023-08-17 RX ADMIN — METOPROLOL SUCCINATE 100 MG: 50 TABLET, EXTENDED RELEASE ORAL at 08:35

## 2023-08-17 RX ADMIN — ALLOPURINOL 300 MG: 300 TABLET ORAL at 08:36

## 2023-08-17 RX ADMIN — VALSARTAN 320 MG: 80 TABLET, FILM COATED ORAL at 08:34

## 2023-08-17 RX ADMIN — ATORVASTATIN CALCIUM 40 MG: 40 TABLET, FILM COATED ORAL at 20:12

## 2023-08-17 RX ADMIN — HYDROCODONE BITARTRATE AND ACETAMINOPHEN 1 TABLET: 5; 325 TABLET ORAL at 18:50

## 2023-08-17 RX ADMIN — HYDRALAZINE HYDROCHLORIDE 50 MG: 25 TABLET, FILM COATED ORAL at 18:32

## 2023-08-17 RX ADMIN — Medication 1000 UNITS: at 08:36

## 2023-08-17 RX ADMIN — CITALOPRAM HYDROBROMIDE 20 MG: 20 TABLET ORAL at 08:36

## 2023-08-17 RX ADMIN — Medication 10 ML: at 20:15

## 2023-08-17 RX ADMIN — HYDRALAZINE HYDROCHLORIDE 50 MG: 25 TABLET, FILM COATED ORAL at 08:35

## 2023-08-17 NOTE — DISCHARGE PLACEMENT REQUEST
"Lori Sutton (73 y.o. Female)       Date of Birth   1950    Social Security Number       Address   [address] [place] [address] [place] [facility]    Home Phone   [phone]    MRN   [id number]       Rastafarian   None    Marital Status                               Admission Date   8/16/23    Admission Type   Emergency    Admitting Provider       Attending Provider   Artemio Mejia MD    Department, Room/Bed   [facility], 241/1       Discharge Date       Discharge Disposition       Discharge Destination                                 Attending Provider: Artemio Mejia MD    Allergies: Nsaids, Lisinopril    Isolation: Contact   Infection: Candida Auris (rule out) (08/17/23)   Code Status: CPR    Ht: 157.5 cm (62\")   Wt: 83.9 kg (185 lb)    Admission Cmt: None   Principal Problem: TIA (transient ischemic attack) [G45.9]                   Active Insurance as of 8/16/2023       Primary Coverage       Payor Plan Insurance Group Employer/Plan Group    MEDICARE MEDICARE A & B        Payor Plan Address Payor Plan Phone Number Payor Plan Fax Number Effective Dates    [address] [phone]  2/1/2013 - None Entered    [facility] [id number]         Subscriber Name Subscriber Birth Date Member ID       LORI SUTTON 1950 [id number]               Secondary Coverage       Payor Plan Insurance Group Employer/Plan Group    [facility]AN F       Payor Plan Address Payor Plan Phone Number Payor Plan Fax Number Effective Dates    [facility] [phone]  1/1/2019 - None Entered    [address]       [facility] [id number]         Subscriber Name Subscriber Birth Date Member ID       LORI SUTTON 1950 [id number]                     Emergency Contacts        (Rel.) Home Phone Work Phone Mobile Phone    Mykel Sutton (Son) -- -- [phone]    HeathJane (Daughter) -- -- [phone]    WILBERTOLEO (Sister) -- -- [phone]    darrius hester " (Other) [phone] -- --    RAMAKRISHNA LADD (Relative) -- -- [phone]

## 2023-08-17 NOTE — DISCHARGE SUMMARY
"             [facility] Medicine Services  Discharge Summary    Date of Service: 2023  Patient Name: Lori Sutton  : 1950  MRN: [id number]    Date of Admission: 2023  Discharge Diagnosis: See below  Date of Discharge: 2023  Primary Care Physician: Ayaz Luna MD    Presenting Problem:   TIA (transient ischemic attack) [G45.9]    Active and Resolved Hospital Problems:  Active Hospital Problems    Diagnosis POA    **TIA (transient ischemic attack) [G45.9] Yes    H/O cervical spine surgery [Z98.890] Not Applicable    Obesity (BMI 30-39.9) [E66.9] Yes    History of cigarette smoking [Z87.891] Not Applicable    Cervical spondylosis with myelopathy [M47.12] Yes    Hypertension [I10] Yes    Hyperlipidemia [E78.5] Yes    Back pain [M54.9] Yes    Depression [F32.A] Yes    Gout [M10.9] Yes    Irritable bowel syndrome [K58.9] Yes    Polyosteoarthritis [M15.9] Yes    Peripheral venous insufficiency [I87.2] Yes      Resolved Hospital Problems    Diagnosis POA    Expressive aphasia [R47.01] Yes    Double vision [H53.2] Yes     Hospital Course     HPI:  Lori Sutton is a 73 y.o. female with with hypertension, asthma/COPD, hypothyroidism and recent C-spine surgery by Dr. Mcgowan for possible is spinal stenosis was at rehab facility who presented to [facility] on 2023 complaining of his speech problems and word finding difficulty which started this morning.  Patient states that she had an unresponsive episode about a week ago where she had to be resuscitated and she has developed double vision since then.  She mainly gets double vision on closer objects.  This morning she tried to talk\" think what she wanted to say but was not able to express time.  This episode lasted for about 30 to 40 minutes and then resolved.  Her diplopia is still going on the for the last 1 week.  She denies any unilateral weakness, fever chills, cough, chest pain, shortness of breath, abdominal pain, " nausea vomiting diarrhea, hematemesis, hematochezia, melena, hemoptysis, dysuria or hematuria.  By the time she arrived to the ER, her expressive aphasia was resolved and she did not receive any tPA.     Hospital course:  She was admitted in consultation with neurology and neurosurgery for further evaluation and treatment.  Neurosurgery recommended ongoing routine postoperative care did not feel like this was related to her surgical last postoperative state.  Brain MRI showed some small vessel disease but no acute infarct.  Lipid panel and A1c findings as noted.  She just had an echocardiogram in May, so this was not repeated. Her symptoms, including her complaint of double vision that preexisted her aphasic symptoms by a couple of weeks at least, have both resolved.  She tells me that she did have an eye exam scheduled but this had to be canceled due to other things going on rehab.  I strongly encouraged her to follow-up with the ophthalmologist within 1 to 2 weeks for an eye exam even though her double vision has currently resolved.  She was started on aspirin, and her Lipitor dose was increased to 40 mg nightly.  She will discharge back to [facility] rehab today and intends to transition to [facility] for ongoing short-term rehab at that facility this coming Sunday.    Addendum 8/18/23:  Discharge was delayed due to lack of transport availability. No acute issues overnight, working on transportation back to [facility] this morning.    Day of Discharge     Vital Signs:  Temp:  [97.1 øF (36.2 øC)-98.2 øF (36.8 øC)] 98.2 øF (36.8 øC)  Heart Rate:  [64-88] 88  Resp:  [11-18] 18  BP: (106-133)/(52-72) 119/61  Flow (L/min):  [3] 3    Physical Exam:  Physical Exam:  GEN: Obese elderly woman, sitting in the bedside chair in no acute distress  HEENT: NCAT, PERRLA, moist mucous membranes  NECK: Supple, c-collar in place postoperatively, midline trachea  CARDIAC: RRR, no peripheral edema  PULM: CTAB, nondistressed  ABD: soft, NTND,  normoactive bowel sounds throughout  SKIN: Warm, dry  NEURO: Grossly intact, nonfocal exam  PSYCH: Pleasant    Pertinent  and/or Most Recent Results     LAB RESULTS:      Lab 08/16/23  1233   WBC 8.70   HEMOGLOBIN 12.2   HEMATOCRIT 35.5   PLATELETS 414   NEUTROS ABS 6.60   LYMPHS ABS 0.90   MONOS ABS 1.00*   EOS ABS 0.20   MCV 99.1*   PROTIME 10.9   APTT 25.0         Lab 08/16/23  1233   SODIUM 130*   POTASSIUM 4.1   CHLORIDE 88*   CO2 32.0*   ANION GAP 10.0   BUN 22   CREATININE 0.95   EGFR 62.6   GLUCOSE 118*   CALCIUM 9.6   HEMOGLOBIN A1C 5.80*   TSH 3.280         Lab 08/16/23  1233   TOTAL PROTEIN 6.8   ALBUMIN 3.9   GLOBULIN 2.9   ALT (SGPT) 16   AST (SGOT) 19   BILIRUBIN 0.3   ALK PHOS 98         Lab 08/16/23  1233   HSTROP T 20*   PROTIME 10.9   INR 1.02         Lab 08/16/23  1233   CHOLESTEROL 177   LDL CHOL 88   HDL CHOL 56   TRIGLYCERIDES 194*         Lab 08/16/23  1233   VITAMIN B 12 >2,000*   ABO TYPING O   RH TYPING Positive   ANTIBODY SCREEN Negative         Brief Urine Lab Results  (Last result in the past 365 days)        Color   Clarity   Blood   Leuk Est   Nitrite   Protein   CREAT   Urine HCG        04/24/23 1558 Dark Yellow   Clear   Negative   Negative   Negative   Negative                 Microbiology Results (last 10 days)       ** No results found for the last 240 hours. **            XR Spine Cervical 2 or 3 View    Result Date: 8/2/2023  Impression: Impression: 1.Postoperative changes status post additional posterior fusion at the C4-C6 spinal levels. There is anatomic alignment of the cervical spine. Surgical changes are seen in the overlying posterior soft tissues. Electronically Signed: Narciso Baez  8/2/2023 9:13 PM EDT  Workstation ID: [id number]    CT Angiogram Neck    Result Date: 8/16/2023  Impression: 1.No acute abnormality is identified within the large arteries of the head or neck. 2.Atherosclerotic plaque within the left carotid bifurcation and bilateral carotid siphons. No  significant ICA stenosis is identified. Electronically Signed: Abiodun Dawson MD  8/16/2023 12:49 PM EDT  Workstation ID: [id number]    CT Cervical Spine Without Contrast    Result Date: 7/31/2023  Impression: Impression: 1. Postoperative changes related to C4-C6 ACDF with corpectomy at C5. 2. Multilevel advanced cervical degenerative findings above. 3. Carotid atherosclerotic disease  asymmetric to the left with retropharyngeal course of the left ICA. Electronically Signed: Quincy Wheeler  7/31/2023 10:06 PM EDT  Workstation ID: [id number]    MRI Brain With & Without Contrast    Result Date: 8/16/2023  Impression: Impression: 1.T2 signal changes involving the cerebral hemispheres and jimena which could reflect more chronic small vessel ischemic change. 2.Evidence for some underlying cerebral atrophy. 3.Minimal left mastoid disease. Electronically Signed: Arnold Ordonez MD  8/16/2023 6:05 PM EDT  Workstation ID: [id number] Prohance    XR Chest 1 View    Result Date: 8/16/2023  Impression: Impression: Left medial basilar airspace disease which may relate to atelectasis. Electronically Signed: Quincy Wheeler MD  8/16/2023 1:40 PM EDT  Workstation ID: [id number]    CT Head Without Contrast Stroke Protocol    Result Date: 8/16/2023  Impression: Impression: 1. No acute intracranial finding. 2. Moderate chronic microvascular disease. 3. Mild atrophy. Electronically Signed: Michelle Perales MD  8/16/2023 1:20 PM EDT  Workstation ID: [id number]    XR Abdomen KUB    Result Date: 8/4/2023  Impression: Impression: Nonobstructive bowel gas pattern. Stool throughout the colon, consistent with provided history of constipation. Electronically Signed: Ayanna Bella  8/4/2023 11:42 AM EDT  Workstation ID: [id number]    CT Angiogram Head w AI Analysis of LVO    Result Date: 8/16/2023  Impression: 1.No acute abnormality is identified within the large arteries of the head or neck. 2.Atherosclerotic plaque within the left carotid bifurcation and bilateral  carotid siphons. No significant ICA stenosis is identified. Electronically Signed: Abiodun Dawson MD  8/16/2023 12:49 PM EDT  Workstation ID: [id number]     Results for orders placed during the hospital encounter of 09/19/22    Doppler Ankle Brachial Index Single Level CAR    Interpretation Summary  ú Right Conclusion: The right UZIEL is normal. Normal digital pressures.  ú Left Conclusion: The left UZIEL is normal. Normal digital pressures.      Results for orders placed during the hospital encounter of 09/19/22    Doppler Ankle Brachial Index Single Level CAR    Interpretation Summary  ú Right Conclusion: The right UZIEL is normal. Normal digital pressures.  ú Left Conclusion: The left UZIEL is normal. Normal digital pressures.      Results for orders placed during the hospital encounter of 05/25/23    Adult Transthoracic Echo Complete W/ Cont if Necessary Per Protocol    Interpretation Summary  Normal LV size and contractility EF of 60 to 65%  Normal RV size  Normal atrial size  Pulmonic valve is not well visualized.  Aortic valve appears mildly calcified and thickened but has adequate cusp separation.  Mild mitral annular calcification seen.  Mild mitral regurgitation seen.  Tricuspid valve appears structurally normal, no significant regurgitation seen.  No pericardial effusion seen.  Proximal aorta appears normal in size.      Labs Pending at Discharge:  Pending Labs       Order Current Status    CANDIDA AURIS SCREEN - Swab, Axilla Right, Axilla Left and Groin In process            Consults:   Consults       Date and Time Order Name Status Description    8/16/2023  1:55 PM Inpatient Neurosurgery Consult Completed     8/16/2023  1:09 PM Hospitalist (on-call MD unless specified)      8/16/2023 12:23 PM Inpatient Neurology Consult Stroke      8/16/2023 12:23 PM Inpatient Neurology Consult Stroke Completed             Discharge Details        Discharge Medications        New Medications        Instructions Start Date    aspirin 81 MG EC tablet  Notes to patient: May cause heartburn, abdominal cramps, nausea or vomiting.  Contact your doctor with any concerns.   81 mg, Oral, Daily             Changes to Medications        Instructions Start Date   atorvastatin 40 MG tablet  Commonly known as: Lipitor  What changed:   medication strength  how much to take  when to take this  Notes to patient: Your dosage has increased to 40mg (previously was 20mg)   40 mg, Oral, Nightly      metoprolol succinate XL 50 MG 24 hr tablet  Commonly known as: Toprol XL  What changed:   medication strength  how much to take   50 mg, Oral, Daily             Continue These Medications        Instructions Start Date   albuterol sulfate  (90 Base) MCG/ACT inhaler  Commonly known as: PROVENTIL HFA;VENTOLIN HFA;PROAIR HFA   INHALE 2 PUFFS BY MOUTH EVERY 4 HOURS AS NEEDED FOR WHEEZING      allopurinol 300 MG tablet  Commonly known as: ZYLOPRIM   TAKE 1 TABLET BY MOUTH  DAILY      baclofen 10 MG tablet  Commonly known as: LIORESAL   10 mg, Oral, 3 Times Daily      cholecalciferol 25 MCG (1000 UT) tablet  Commonly known as: VITAMIN D3   Take 1 tablet by mouth Daily.      citalopram 20 MG tablet  Commonly known as: CeleXA   TAKE 1 TABLET BY MOUTH  DAILY      hydrALAZINE 50 MG tablet  Commonly known as: APRESOLINE   TAKE 1 TABLET BY MOUTH 3  TIMES DAILY      HYDROcodone-acetaminophen 5-325 MG per tablet  Commonly known as: NORCO   1 tablet, Oral, Every 4 Hours PRN      levothyroxine 25 MCG tablet  Commonly known as: SYNTHROID, LEVOTHROID   TAKE 1 TABLET BY MOUTH  DAILY      montelukast 10 MG tablet  Commonly known as: SINGULAIR   TAKE 1 TABLET BY MOUTH  DAILY      Restasis 0.05 % ophthalmic emulsion  Generic drug: cycloSPORINE   Administer 1 drop to both eyes Daily As Needed.      triamcinolone 0.5 % ointment  Commonly known as: KENALOG   APPLY TOPICALLY TO THE  APPROPRIATE AREA AS  DIRECTED TWICE DAILY      valsartan-hydrochlorothiazide 320-25 MG per  tablet  Commonly known as: DIOVAN-HCT   1 tablet, Oral, Daily               Allergies   Allergen Reactions    Nsaids GI Bleeding    Lisinopril Cough     Discharge Disposition:   Rehab Facility or Unit (DC - External)    Diet:  Hospital:  Diet Order   Procedures    Diet: Regular/House Diet; Texture: Regular Texture (IDDSI 7); Fluid Consistency: Thin (IDDSI 0)     Discharge Activity:   Activity Instructions       Activity as Tolerated      Gradually Increase Activity Until at Pre-Hospitalization Level            CODE STATUS:  Code Status and Medical Interventions:   Ordered at: 08/16/23 1353     Level Of Support Discussed With:    Patient     Code Status (Patient has no pulse and is not breathing):    CPR (Attempt to Resuscitate)     Medical Interventions (Patient has pulse or is breathing):    Full Support       Future Appointments   Date Time Provider Department Center   8/25/2023  1:30 PM Ayaz Luna MD [facility]   5/28/2024 11:50 AM Diego Garza MD [facility]       Additional Instructions for the Follow-ups that You Need to Schedule       Call MD With Problems / Concerns   As directed      Instructions: PCM versus appropriate specialist    Order Comments: Instructions: PCM versus appropriate specialist         Discharge Follow-up with PCP   As directed       Currently Documented PCP:    Ayaz Luna MD    PCP Phone Number:    [phone]     Follow Up Details: Within 1 week of discharge        Discharge Follow-up with Specialty: Neurology and neurosurgery follow-up as per their respective services.  Recommend seeing an ophthalmologist within the next 1 to 2 weeks for the double vision that preexisted this event and currently has resolve...   As directed      Specialty: Neurology and neurosurgery follow-up as per their respective services.  Recommend seeing an ophthalmologist within the next 1 to 2 weeks for the double vision that preexisted this event and currently  has resolved.              Time spent on Discharge including face to face service: 35 minutes    Signature: Electronically signed by Artemio Mejia MD, 08/17/23, 13:33 EDT.  [facility] Hospitalist Team

## 2023-08-17 NOTE — THERAPY EVALUATION
Patient Name: Lori Sutton  : 1950    MRN: [id number]                              Today's Date: 2023       Admit Date: 2023    Visit Dx:     ICD-10-CM ICD-9-CM   1. TIA (transient ischemic attack)  G45.9 435.9     Patient Active Problem List   Diagnosis    Medicare annual wellness visit, subsequent    Hypertension    Hyperlipidemia    Fibrocystic breast    Back pain    Depression    Edema    Gout    Irritable bowel syndrome    Lymphedema of lower extremity    Peripheral venous insufficiency    Polyosteoarthritis    Reactive airways dysfunction syndrome    Cough    Idiopathic progressive neuropathy    Cervical spondylosis with myelopathy    Reactive airway disease with acute exacerbation    TIA (transient ischemic attack)    H/O cervical spine surgery    Obesity (BMI 30-39.9)    History of cigarette smoking     Past Medical History:   Diagnosis Date    Anxiety     Arthritis     Asthma     Constipation     COPD (chronic obstructive pulmonary disease)     DDD (degenerative disc disease), cervical     DDD (degenerative disc disease), lumbar     Disease of thyroid gland     Double vision 2023    Emphysema lung     Fibrocystic breast     H/O cervical spine surgery 2023    Hard of hearing     wears hearing aids bilat    History of cigarette smoking 2023    Hyperlipidemia     Hypertension     Lymphedema of both lower extremities     red and irritable    Obesity (BMI 30-39.9) 2023    PONV (postoperative nausea and vomiting)     Seasonal allergies     Sleep apnea     not formally dx- pt O2 drops to mid-70s while asleep    Urinary incontinence      Past Surgical History:   Procedure Laterality Date    APPENDECTOMY      AUGMENTATION MAMMAPLASTY      BREAST CYST ASPIRATION      BREAST CYST EXCISION      CERVICAL FUSION Right 2023    Procedure: DAY 1 -CERVICAL CORPECTOMY  LEVEL CERVICAL 5 WITH FUSION LEVELS CERVICAL 4-6 ANTERIOR;  Surgeon: Wilber Mcgowan MD;  Location: [facility]  MAIN OR;  Service: Neurosurgery;  Laterality: Right;    CERVICAL FUSION Right 8/2/2023    Procedure: DAY 2: CERVICAL 4-6 LAMINECTOMY & POSTERIOR LATERAL FUSION WITH INSTRUMENTATION 3-4 LEVELS;  Surgeon: Wilber Mcgowan MD;  Location: [facility] MAIN OR;  Service: Neurosurgery;  Laterality: Right;    HYSTERECTOMY      THYROIDECTOMY, PARTIAL      TONSILLECTOMY  1958      General Information       Row Name 08/17/23 1516          Physical Therapy Time and Intention    Document Type evaluation  -CR     Mode of Treatment physical therapy  -CR       Row Name 08/17/23 1516          General Information    Patient Profile Reviewed yes  -CR     Prior Level of Function independent:;gait;ADL's  prior to neck sx 8/2/2023  -CR     Existing Precautions/Restrictions cervical collar  cervical spine precaution  -CR     Barriers to Rehab medically complex  -CR       Row Name 08/17/23 1516          Living Environment    People in Home alone  -CR       Row Name 08/17/23 1516          Home Main Entrance    Number of Stairs, Main Entrance none  -CR       Row Name 08/17/23 1516          Stairs Within Home, Primary    Number of Stairs, Within Home, Primary none  -CR       Row Name 08/17/23 1516          Cognition    Orientation Status (Cognition) oriented x 3  -CR       Row Name 08/17/23 1516          Safety Issues, Functional Mobility    Impairments Affecting Function (Mobility) endurance/activity tolerance;strength;sensation/sensory awareness  -CR               User Key  (r) = Recorded By, (t) = Taken By, (c) = Cosigned By      Initials Name Provider Type    CR Reyes, Carmela, PT Physical Therapist                   Mobility       Row Name 08/17/23 1518          Bed Mobility    Bed Mobility supine-sit  -CR     Supine-Sit [place] (Bed Mobility) supervision  -CR     Assistive Device (Bed Mobility) bed rails  -CR       Row Name 08/17/23 1518          Bed-Chair Transfer    Bed-Chair [place] (Transfers) contact guard  -CR       Row Name  08/17/23 1518          Sit-Stand Transfer    Sit-Stand [place] (Transfers) contact guard  -CR     Assistive Device (Sit-Stand Transfers) walker, front-wheeled  -CR       Row Name 08/17/23 1518          Gait/Stairs (Locomotion)    [place] Level (Gait) minimum assist (75% patient effort)  -CR     Assistive Device (Gait) walker, front-wheeled  -CR     Distance in Feet (Gait) 40  -CR     Deviations/Abnormal Patterns (Gait) gait speed decreased;stride length decreased  -CR               User Key  (r) = Recorded By, (t) = Taken By, (c) = Cosigned By      Initials Name Provider Type    CR Reyes, Carmela, PT Physical Therapist                   Obj/Interventions       Row Name 08/17/23 1519          Range of Motion Comprehensive    Comment, General Range of Motion AROM BLE wfl  -CR       Row Name 08/17/23 1519          Strength Comprehensive (MMT)    Comment, General Manual Muscle Testing (MMT) Assessment BLE grossly 3+/5  -CR       Row Name 08/17/23 1519          Balance    Balance Assessment sitting static balance;sitting dynamic balance;sit to stand dynamic balance;standing static balance;standing dynamic balance  -CR     Static Sitting Balance standby assist  -CR     Dynamic Sitting Balance standby assist  -CR     Position, Sitting Balance sitting edge of bed  -CR     Sit to Stand Dynamic Balance contact guard  -CR     Static Standing Balance contact guard  -CR     Dynamic Standing Balance minimal assist  -CR     Position/Device Used, Standing Balance walker, rolling  -CR       Row Name 08/17/23 1519          Sensory Assessment (Somatosensory)    Sensory Assessment (Somatosensory) --  reports tingling to both hands  -CR               User Key  (r) = Recorded By, (t) = Taken By, (c) = Cosigned By      Initials Name Provider Type    CR Reyes, Carmela, PT Physical Therapist                   Goals/Plan       Row Name 08/17/23 1534          Bed Mobility Goal 1 (PT)    Activity/Assistive Device (Bed Mobility Goal  1, PT) sit to supine/supine to sit  -CR     [place] Level/Cues Needed (Bed Mobility Goal 1, PT) modified independence  -CR     Time Frame (Bed Mobility Goal 1, PT) long term goal (LTG);2 weeks  -CR       Row Name 08/17/23 1534          Transfer Goal 1 (PT)    Activity/Assistive Device (Transfer Goal 1, PT) transfers, all;walker, rolling  -CR     [place] Level/Cues Needed (Transfer Goal 1, PT) standby assist  -CR     Time Frame (Transfer Goal 1, PT) long term goal (LTG);1 week  -CR       Row Name 08/17/23 1534          Gait Training Goal 1 (PT)    Activity/Assistive Device (Gait Training Goal 1, PT) gait (walking locomotion);diminish gait deviation;improve balance and speed;increase endurance/gait distance  -CR     [place] Level (Gait Training Goal 1, PT) standby assist  -CR     Distance (Gait Training Goal 1, PT) 150 ft x 2  -CR     Time Frame (Gait Training Goal 1, PT) long term goal (LTG);2 weeks  -CR       Row Name 08/17/23 7594          Therapy Assessment/Plan (PT)    Planned Therapy Interventions (PT) balance training;bed mobility training;gait training;home exercise program;patient/family education;transfer training;strengthening  -CR               User Key  (r) = Recorded By, (t) = Taken By, (c) = Cosigned By      Initials Name Provider Type    CR Reyes, Carmela, PT Physical Therapist                   Clinical Impression       Row Name 08/17/23 1523          Pain    Additional Documentation Pain Scale: FACES Pre/Post-Treatment (Group)  -CR       Row Name 08/17/23 1520          Pain Scale: FACES Pre/Post-Treatment    Pain: FACES Scale, Pretreatment 0-->no hurt  -CR     Posttreatment Pain Rating 4-->hurts little more  -CR     Pain Location - neck  -CR       Row Name 08/17/23 152          Plan of Care Review    Plan of Care Reviewed With patient  -CR     Outcome Evaluation 72 y/o female brought in from acute rehab hospital on 8/16 due to word finding difficulty.Symptoms resolved by the time  patient got to ER. PMH includes cervical spine sx on8/2/2023, anxiety, COPD, lymphedema. Patient resides alone at baseline and independent with mobility including driving. Unsure of progress made at the rehab. At time of eval, patient required supervision for supine to sit. Patient able to come to standing with min/CGA using rw. Patient presented with poor sit to stand technique. Patient able to ambulate using rw with CGA for 40 ft ; gait slow but no buckling. Attempted gait without a.d. and patient unsure of herself, wanted HHA or reaching for furniture. Patient is not at her baseline and will benefit from continued skilled rehab services prior to home.  -CR       Row Name 08/17/23 1520          Therapy Assessment/Plan (PT)    Patient/Family Therapy Goals Statement (PT) independent with ADL's  -CR     Rehab Potential (PT) good, to achieve stated therapy goals  -CR     Criteria for Skilled Interventions Met (PT) yes;skilled treatment is necessary  -CR     Therapy Frequency (PT) 5 times/wk  -CR     Predicted Duration of Therapy Intervention (PT) dc  -CR               User Key  (r) = Recorded By, (t) = Taken By, (c) = Cosigned By      Initials Name Provider Type    CR Reyes, Carmela, PT Physical Therapist                   Outcome Measures       Row Name 08/17/23 1535 08/17/23 0809       How much help from another person do you currently need...    Turning from your back to your side while in flat bed without using bedrails? 3  -CR 3  -AS    Moving from lying on back to sitting on the side of a flat bed without bedrails? 3  -CR 3  -AS    Moving to and from a bed to a chair (including a wheelchair)? 3  -CR 3  -AS    Standing up from a chair using your arms (e.g., wheelchair, bedside chair)? 3  -CR 3  -AS    Climbing 3-5 steps with a railing? 3  -CR 3  -AS    To walk in hospital room? 3  -CR 3  -AS    AM-PAC 6 Clicks Score (PT) 18  -CR 18  -AS    Highest level of mobility 6 --> Walked 10 steps or more  -CR 6 --> Walked  10 steps or more  -AS              User Key  (r) = Recorded By, (t) = Taken By, (c) = Cosigned By      Initials Name Provider Type    CR Reyes, Carmela, PT Physical Therapist    Jany Talamantes RN Registered Nurse                                 Physical Therapy Education       Title: PT OT SLP Therapies (In Progress)       Topic: Physical Therapy (In Progress)       Point: Mobility training (In Progress)       Learning Progress Summary             Patient Acceptance, E, NR by CR at 8/17/2023 1535                         Point: Home exercise program (Not Started)       Learner Progress:  Not documented in this visit.              Point: Precautions (Not Started)       Learner Progress:  Not documented in this visit.                              User Key       Initials Effective Dates Name Provider Type Discipline    CR 06/16/21 -  Reyes, Carmela, PT Physical Therapist PT                  PT Recommendation and Plan  Planned Therapy Interventions (PT): balance training, bed mobility training, gait training, home exercise program, patient/family education, transfer training, strengthening  Plan of Care Reviewed With: patient  Outcome Evaluation: 72 y/o female brought in from acute rehab hospital on 8/16 due to word finding difficulty.Symptoms resolved by the time patient got to ER. PMH includes cervical spine sx on8/2/2023, anxiety, COPD, lymphedema. Patient resides alone at baseline and independent with mobility including driving. Unsure of progress made at the rehab. At time of eval, patient required supervision for supine to sit. Patient able to come to standing with min/CGA using rw. Patient presented with poor sit to stand technique. Patient able to ambulate using rw with CGA for 40 ft ; gait slow but no buckling. Attempted gait without a.d. and patient unsure of herself, wanted HHA or reaching for furniture. Patient is not at her baseline and will benefit from continued skilled rehab services prior to  home.     Time Calculation:         PT Charges       Row Name 08/17/23 1536             Time Calculation    Start Time 0932  -CR      Stop Time 1005  -CR      Time Calculation (min) 33 min  -CR      PT Received On 08/17/23  -CR      PT - Next Appointment 08/18/23  -CR      PT Goal Re-Cert Due Date 08/31/23  -CR         Time Calculation- PT    Total Timed Code Minutes- PT 0 minute(s)  -CR                User Key  (r) = Recorded By, (t) = Taken By, (c) = Cosigned By      Initials Name Provider Type    CR Reyes, Carmela, PT Physical Therapist                  Therapy Charges for Today       Code Description Service Date Service Provider Modifiers Qty    93987383037 HC PT EVAL LOW COMPLEXITY 4 8/17/2023 Reyes, Carmela, PT GP 1            PT G-Codes  AM-PAC 6 Clicks Score (PT): 18  PT Discharge Summary  Anticipated Discharge Disposition (PT): skilled nursing facility    Carmela Reyes, PT  8/17/2023

## 2023-08-17 NOTE — PROGRESS NOTES
"     LOS: 0 days     Chief Complaint: Speech difficulty, word-finding problems        SUBJECTIVE:    History taken from: patient chart    Interval History: No issues with speech since yesterday.     No other problems in the past with speech.     Patient was admitted to [facility] earlier this month for an episode of syncope. She was at rehab and just walked \"a very long way.\" She was sitting down and trying to get up but felt very weak throughout. The next thing she knows she was on the floor. No other hx of anything similar.     Patient Complaints: n/a       Review of Systems   Constitutional: Negative.    Eyes:  Negative for visual disturbance.   Cardiovascular: Negative.    Neurological:  Negative for dizziness, tremors, seizures, syncope, facial asymmetry, speech difficulty, weakness, light-headedness, numbness and headaches.         Pertinent PMH:  has a past medical history of Anxiety, Arthritis, Asthma, Constipation, COPD (chronic obstructive pulmonary disease), DDD (degenerative disc disease), cervical, DDD (degenerative disc disease), lumbar, Disease of thyroid gland, Emphysema lung, Fibrocystic breast, Hard of hearing, Hyperlipidemia, Hypertension, Lymphedema of both lower extremities, PONV (postoperative nausea and vomiting), Seasonal allergies, Sleep apnea, and Urinary incontinence.   ________________________________________________     OBJECTIVE:    On exam:  GENERAL: NAD  CARDIO: RRR  NEURO:  Awake, alert, sitting up in the chair  C-collar intact   Oriented x3  EOMI, PERRL, no visual field deficits  No facial asymmetry, slight ptosis left eye chronic   Speech clear without dysarthria  Sensations intact and equal bilaterally  Strength 5-/5 and equal in all extremities  No ataxia  ________________________________________________   RESULTS REVIEW    VITAL SIGNS:  Temp:  [97.1 øF (36.2 øC)-98.1 øF (36.7 øC)] 98 øF (36.7 øC)  Heart Rate:  [63-84] 84  Resp:  [11-18] 11  BP: (106-135)/() " 133/64    LABS:       Lab 08/16/23  1233   WBC 8.70   HEMOGLOBIN 12.2   HEMATOCRIT 35.5   PLATELETS 414   NEUTROS ABS 6.60   LYMPHS ABS 0.90   MONOS ABS 1.00*   EOS ABS 0.20   MCV 99.1*   PROTIME 10.9   APTT 25.0         Lab 08/16/23  1233   SODIUM 130*   POTASSIUM 4.1   CHLORIDE 88*   CO2 32.0*   ANION GAP 10.0   BUN 22   CREATININE 0.95   EGFR 62.6   GLUCOSE 118*   CALCIUM 9.6   HEMOGLOBIN A1C 5.80*   TSH 3.280         Lab 08/16/23  1233   TOTAL PROTEIN 6.8   ALBUMIN 3.9   GLOBULIN 2.9   ALT (SGPT) 16   AST (SGOT) 19   BILIRUBIN 0.3   ALK PHOS 98         Lab 08/16/23  1233   HSTROP T 20*   PROTIME 10.9   INR 1.02         Lab 08/16/23  1233   CHOLESTEROL 177   LDL CHOL 88   HDL CHOL 56   TRIGLYCERIDES 194*         Lab 08/16/23  1233   VITAMIN B 12 >2,000*   ABO TYPING O   RH TYPING Positive   ANTIBODY SCREEN Negative         UA          9/7/2022    11:50 4/24/2023    15:58   Urinalysis   Squamous Epithelial Cells, UA 0-2     Specific Gravity, UA 1.013  1.018    Ketones, UA Negative  Trace    Blood, UA Negative  Negative    Leukocytes, UA Trace  Negative    Nitrite, UA Negative  Negative    RBC, UA 0-2     WBC, UA 0-2     Bacteria, UA None Seen         Lab Results   Component Value Date    TSH 3.280 08/16/2023    LDL 88 08/16/2023    HGBA1C 5.80 (H) 08/16/2023    ALBNXAGC24 >2,000 (H) 08/16/2023         IMAGING STUDIES:  CT Angiogram Neck    Result Date: 8/16/2023  1.No acute abnormality is identified within the large arteries of the head or neck. 2.Atherosclerotic plaque within the left carotid bifurcation and bilateral carotid siphons. No significant ICA stenosis is identified. Electronically Signed: Abiodun Dawson MD  8/16/2023 12:49 PM EDT  Workstation ID: [id number]    MRI Brain With & Without Contrast    Result Date: 8/16/2023  Impression: 1.T2 signal changes involving the cerebral hemispheres and jimena which could reflect more chronic small vessel ischemic change. 2.Evidence for some underlying cerebral  atrophy. 3.Minimal left mastoid disease. Electronically Signed: Arnold Ordonez MD  8/16/2023 6:05 PM EDT  Workstation ID: [id number] [employer]    XR Chest 1 View    Result Date: 8/16/2023  Impression: Left medial basilar airspace disease which may relate to atelectasis. Electronically Signed: Quincy Wheeler MD  8/16/2023 1:40 PM EDT  Workstation ID: [id number]    CT Head Without Contrast Stroke Protocol    Result Date: 8/16/2023  Impression: 1. No acute intracranial finding. 2. Moderate chronic microvascular disease. 3. Mild atrophy. Electronically Signed: Michelle Perales MD  8/16/2023 1:20 PM EDT  Workstation ID: [id number]    CT Angiogram Head w AI Analysis of LVO    Result Date: 8/16/2023  1.No acute abnormality is identified within the large arteries of the head or neck. 2.Atherosclerotic plaque within the left carotid bifurcation and bilateral carotid siphons. No significant ICA stenosis is identified. Electronically Signed: Abiodun Dawson MD  8/16/2023 12:49 PM EDT  Workstation ID: [id number]     I reviewed the patient's new clinical results.    ________________________________________________      PROBLEM LIST:    TIA (transient ischemic attack)        ASSESSMENT/PLAN:  TIA. Expressive aphasia resolved within 30 minutes. MRI brain is negative for stroke.    - CT head reviewed and negative for hemorrhage/mass.   - CTA head and neck: No significant stenosis of the head or neck  - MRI brain reviewed personally- no acute or subacute stroke. There is chronic small vessel ischemic changes and cerebral atrophy.   - Echo (8/10) EF 58% no thrombus   - EKG: SR rate 66   - Labs: A1C: 5.8, B12: >2,000, LDL: 88,TSH: 3.280  - Antithrombotics: ASA  81 or 325 mg daily   - Statin: Lipitor 40 mg   - PT/OT/ST as appropriate, Neuro checks per protocol, DVT prophylaxis, Stroke education     Modification of stroke risk factors:   - Blood pressure should be less than 130/80 outpatient, HbA1c less than 6.5, LDL less than 70; b12>500 and smoking  cessation if applicable. We would be grateful if the primary team / primary care physician would keep a close watch on the above targets.  - Stroke education  - Follow up with Stroke clinic      There are no further recommendations. Will sign off, please call with any questions or concerns. I discussed the patient's findings and my recommendations with patient and nursing staff.        Shiloh Gomez, BEATRICE  08/17/23  10:39 EDT

## 2023-08-17 NOTE — THERAPY EVALUATION
Patient Name: Lori Sutton  : 1950    MRN: [id number]                              Today's Date: 2023       Admit Date: 2023    Visit Dx:     ICD-10-CM ICD-9-CM   1. TIA (transient ischemic attack)  G45.9 435.9     Patient Active Problem List   Diagnosis    Medicare annual wellness visit, subsequent    Hypertension    Hyperlipidemia    Fibrocystic breast    Back pain    Depression    Edema    Gout    Irritable bowel syndrome    Lymphedema of lower extremity    Peripheral venous insufficiency    Polyosteoarthritis    Reactive airways dysfunction syndrome    Cough    Idiopathic progressive neuropathy    Cervical spondylosis with myelopathy    Reactive airway disease with acute exacerbation    TIA (transient ischemic attack)    H/O cervical spine surgery    Obesity (BMI 30-39.9)    History of cigarette smoking     Past Medical History:   Diagnosis Date    Anxiety     Arthritis     Asthma     Constipation     COPD (chronic obstructive pulmonary disease)     DDD (degenerative disc disease), cervical     DDD (degenerative disc disease), lumbar     Disease of thyroid gland     Double vision 2023    Emphysema lung     Fibrocystic breast     H/O cervical spine surgery 2023    Hard of hearing     wears hearing aids bilat    History of cigarette smoking 2023    Hyperlipidemia     Hypertension     Lymphedema of both lower extremities     red and irritable    Obesity (BMI 30-39.9) 2023    PONV (postoperative nausea and vomiting)     Seasonal allergies     Sleep apnea     not formally dx- pt O2 drops to mid-70s while asleep    Urinary incontinence      Past Surgical History:   Procedure Laterality Date    APPENDECTOMY      AUGMENTATION MAMMAPLASTY      BREAST CYST ASPIRATION      BREAST CYST EXCISION      CERVICAL FUSION Right 2023    Procedure: DAY 1 -CERVICAL CORPECTOMY  LEVEL CERVICAL 5 WITH FUSION LEVELS CERVICAL 4-6 ANTERIOR;  Surgeon: Wilber Mcgowan MD;  Location: [facility]  MAIN OR;  Service: Neurosurgery;  Laterality: Right;    CERVICAL FUSION Right 8/2/2023    Procedure: DAY 2: CERVICAL 4-6 LAMINECTOMY & POSTERIOR LATERAL FUSION WITH INSTRUMENTATION 3-4 LEVELS;  Surgeon: Wilber Mcgowan MD;  Location: [facility] MAIN OR;  Service: Neurosurgery;  Laterality: Right;    HYSTERECTOMY      THYROIDECTOMY, PARTIAL      TONSILLECTOMY  1958      General Information       Row Name 08/17/23 1633          OT Time and Intention    Document Type evaluation  -SR     Mode of Treatment occupational therapy  -SR       Row Name 08/17/23 1633          Occupational Profile    Reason for Services/Referral (Occupational Profile) Lori Sutton is a 73 y.o. female with with hypertension, asthma/COPD, hypothyroidism and recent C-spine surgery by Dr. Mcgowan for possible is spinal stenosis was at rehab facility who presented to [facility] on 8/16/2023 complaining of his speech problems and word finding difficulty.  She has had diplopia for about 1 week since having an unresponsive episode.  Pt has been at [facility] rehab since surgery.  -SR       Row Name 08/17/23 1633          Living Environment    People in Home alone  -SR       Row Name 08/17/23 1633          Cognition    Orientation Status (Cognition) oriented x 3  -SR       Row Name 08/17/23 1633          Safety Issues, Functional Mobility    Impairments Affecting Function (Mobility) endurance/activity tolerance;strength;sensation/sensory awareness  -SR               User Key  (r) = Recorded By, (t) = Taken By, (c) = Cosigned By      Initials Name Provider Type    SR Lauren Sargent, OT Occupational Therapist                     Mobility/ADL's       Row Name 08/17/23 1643          Bed Mobility    Bed Mobility supine-sit  -SR     Supine-Sit [place] (Bed Mobility) supervision  -SR       Row Name 08/17/23 1643          Bed-Chair Transfer    Bed-Chair [place] (Transfers) contact guard  -SR       Row Name 08/17/23 1643          Sit-Stand  Transfer    Sit-Stand [place] (Transfers) contact guard  -SR     Assistive Device (Sit-Stand Transfers) walker, front-wheeled  -SR       Row Name 08/17/23 1643          Functional Mobility    Functional Mobility- Ind. Level contact guard assist  -SR     Functional Mobility- Device walker, front-wheeled  -SR       Row Name 08/17/23 1643          Activities of Daily Living    BADL Assessment/Intervention toileting  -SR       Row Name 08/17/23 1643          Toileting Assessment/Training    [place] Level (Toileting) maximum assist (25% patient effort)  -SR               User Key  (r) = Recorded By, (t) = Taken By, (c) = Cosigned By      Initials Name Provider Type    Lauren Jenkins OT Occupational Therapist                   Obj/Interventions       Row Name 08/17/23 1643          Range of Motion Comprehensive    Comment, General Range of Motion UE WFL  -SR       [facility] Name 08/17/23 1643          Strength Comprehensive (MMT)    Comment, General Manual Muscle Testing (MMT) Assessment Did not MMT proximal UE due to cervical surgery.   3+/5.  Pt reports tingling and numbness in B hands.  -SR       Row Name 08/17/23 1643          Balance    Balance Interventions sitting;standing;sit to stand;supported;dynamic;static;minimal challenge  -SR               User Key  (r) = Recorded By, (t) = Taken By, (c) = Cosigned By      Initials Name Provider Type    SR Lauren Sargent, OT Occupational Therapist                   Goals/Plan       [facility] Name 08/17/23 1645          Bathing Goal 1 (OT)    Activity/Device (Bathing Goal 1, OT) bathing skills, all  -SR     [place] Level/Cues Needed (Bathing Goal 1, OT) minimum assist (75% or more patient effort)  -SR     Time Frame (Bathing Goal 1, OT) 2 weeks  -SR       Row Name 08/17/23 1645          Dressing Goal 1 (OT)    Activity/Device (Dressing Goal 1, OT) dressing skills, all  -SR     [place]/Cues Needed (Dressing Goal 1, OT) minimum assist (75% or  more patient effort)  -SR     Time Frame (Dressing Goal 1, OT) 2 weeks  -SR       Row Name 08/17/23 7541          Toileting Goal 1 (OT)    Activity/Device (Toileting Goal 1, OT) toileting skills, all  -SR     [place] Level/Cues Needed (Toileting Goal 1, OT) minimum assist (75% or more patient effort)  -SR     Time Frame (Toileting Goal 1, OT) 2 weeks  -SR       Row Name 08/17/23 1641          Therapy Assessment/Plan (OT)    Planned Therapy Interventions (OT) activity tolerance training;BADL retraining;functional balance retraining;IADL retraining;occupation/activity based interventions;patient/caregiver education/training;ROM/therapeutic exercise;strengthening exercise;transfer/mobility retraining  -SR               User Key  (r) = Recorded By, (t) = Taken By, (c) = Cosigned By      Initials Name Provider Type    SR Lauren Sargent, OT Occupational Therapist                   Clinical Impression       Row Name 08/17/23 2396          Pain Scale: FACES Pre/Post-Treatment    Pain: FACES Scale, Pretreatment 0-->no hurt  -SR     Posttreatment Pain Rating 4-->hurts little more  -SR       Row Name 08/17/23 7082          Plan of Care Review    Outcome Evaluation Lori Sutton is a 73 y.o. female with with hypertension, asthma/COPD, hypothyroidism and recent C-spine surgery by Dr. Mcgowan for possible is spinal stenosis was at rehab facility who presented to [facility] on 8/16/2023 complaining of his speech problems and word finding difficulty. She has had diplopia for about 1 week since having an unresponsive episode. Pt has been at [facility] rehab since surgery. She tolerates mobility well, though BP steadily decreases with upright activity, though pt is not symptomatic. She was placed in recliner with feet reclined and BP back to normal limits. She plans to go to SNF s/p acute rehab to further increase independence before returning home. Recommend SNF at discharge.  -SR       Row Name 08/17/23 1641           Therapy Assessment/Plan (OT)    Rehab Potential (OT) good, to achieve stated therapy goals  -SR     Criteria for Skilled Therapeutic Interventions Met (OT) yes  -SR     Therapy Frequency (OT) 3 times/wk  -SR     Predicted Duration of Therapy Intervention (OT) Until discharge  -SR       Row Name 08/17/23 1645          Therapy Plan Review/Discharge Plan (OT)    Anticipated Discharge Disposition (OT) skilled nursing facility  -SR       Row Name 08/17/23 1645          Positioning and Restraints    Pre-Treatment Position in bed  -SR     Post Treatment Position chair  -SR     In Chair call light within reach;exit alarm on;encouraged to call for assist  -SR               User Key  (r) = Recorded By, (t) = Taken By, (c) = Cosigned By      Initials Name Provider Type    SR Lauren Sargent, OT Occupational Therapist                   Outcome Measures       Row Name 08/17/23 1535 08/17/23 0809       How much help from another person do you currently need...    Turning from your back to your side while in flat bed without using bedrails? 3  -CR 3  -AS    Moving from lying on back to sitting on the side of a flat bed without bedrails? 3  -CR 3  -AS    Moving to and from a bed to a chair (including a wheelchair)? 3  -CR 3  -AS    Standing up from a chair using your arms (e.g., wheelchair, bedside chair)? 3  -CR 3  -AS    Climbing 3-5 steps with a railing? 3  -CR 3  -AS    To walk in hospital room? 3  -CR 3  -AS    AM-PAC 6 Clicks Score (PT) 18  -CR 18  -AS    Highest level of mobility 6 --> Walked 10 steps or more  -CR 6 --> Walked 10 steps or more  -AS              User Key  (r) = Recorded By, (t) = Taken By, (c) = Cosigned By      Initials Name Provider Type    CR Reyes, Carmela, PT Physical Therapist    AS Jany Mcconnell RN Registered Nurse                    Occupational Therapy Education       Title: PT OT SLP Therapies (In Progress)       Topic: Occupational Therapy (In Progress)       Point: Body mechanics (In  Progress)       Description:   Instruct learner(s) on proper positioning and spine alignment during self-care, functional mobility activities and/or exercises.                  Learning Progress Summary             Patient Acceptance, E,TB, NR by SR at 8/17/2023 1646                                         User Key       Initials Effective Dates Name Provider Type Discipline     06/16/21 -  Lauren Sargent, OT Occupational Therapist OT                  OT Recommendation and Plan  Planned Therapy Interventions (OT): activity tolerance training, BADL retraining, functional balance retraining, IADL retraining, occupation/activity based interventions, patient/caregiver education/training, ROM/therapeutic exercise, strengthening exercise, transfer/mobility retraining  Therapy Frequency (OT): 3 times/wk  Plan of Care Review  Outcome Evaluation: Lori Sutton is a 73 y.o. female with with hypertension, asthma/COPD, hypothyroidism and recent C-spine surgery by Dr. Mcgowan for possible is spinal stenosis was at rehab facility who presented to [facility] on 8/16/2023 complaining of his speech problems and word finding difficulty. She has had diplopia for about 1 week since having an unresponsive episode. Pt has been at [facility] rehab since surgery. She tolerates mobility well, though BP steadily decreases with upright activity, though pt is not symptomatic. She was placed in recliner with feet reclined and BP back to normal limits. She plans to go to SNF s/p acute rehab to further increase independence before returning home. Recommend SNF at discharge.     Time Calculation:         Time Calculation- OT       Row Name 08/17/23 1689             Time Calculation- OT    OT Start Time 1013  -SR      OT Stop Time 1045  -SR      OT Time Calculation (min) 32 min  -SR      Total Timed Code Minutes- OT 0 minute(s)  -SR      OT Received On 08/17/23  -SR      OT - Next Appointment 08/18/23  -      OT Goal Re-Cert Due Date  08/31/23  -SR                User Key  (r) = Recorded By, (t) = Taken By, (c) = Cosigned By      Initials Name Provider Type    SR Lauren Sargent, OT Occupational Therapist                  Therapy Charges for Today       Code Description Service Date Service Provider Modifiers Qty    [id number]  OT EVAL MOD COMPLEXITY 4 8/17/2023 Lauren Sargent OT GO 1                 Lauren Sargent OT  8/17/2023

## 2023-08-17 NOTE — PLAN OF CARE
Goal Outcome Evaluation:         Patient is from [facility] rehab admitted for stroke work up. Candida auris rule out. NS running at 75ml/hr. Continuing to monitor.

## 2023-08-17 NOTE — PLAN OF CARE
Goal Outcome Evaluation:              Outcome Evaluation: Lori SINGH Lesley is a 73 y.o. female with with hypertension, asthma/COPD, hypothyroidism and recent C-spine surgery by Dr. Mcgowan for possible is spinal stenosis was at rehab facility who presented to [facility] on 8/16/2023 complaining of his speech problems and word finding difficulty. She has had diplopia for about 1 week since having an unresponsive episode. Pt has been at \[facility]\"" rehab since surgery. She tolerates mobility well, though BP steadily decreases with upright activity, though pt is not symptomatic. She was placed in recliner with feet reclined and BP back to normal limits. She plans to go to SNF s/p acute rehab to further increase independence before returning home. Recommend SNF at discharge.      Anticipated Discharge Disposition (OT): skilled nursing facility

## 2023-08-17 NOTE — PLAN OF CARE
Goal Outcome Evaluation:  Plan of Care Reviewed With: patient           Outcome Evaluation: 72 y/o female brought in from acute rehab hospital on 8/16 due to word finding difficulty.Symptoms resolved by the time patient got to ER. PMH includes cervical spine sx on8/2/2023, anxiety, COPD, lymphedema. Patient resides alone at baseline and independent with mobility including driving. Unsure of progress made at the rehab. At time of eval, patient required supervision for supine to sit. Patient able to come to standing with min/CGA using rw. Patient presented with poor sit to stand technique. Patient able to ambulate using rw with CGA for 40 ft ; gait slow but no buckling. Attempted gait without a.d. and patient unsure of herself, wanted HHA or reaching for furniture. Patient is not at her baseline and will benefit from continued skilled rehab services prior to home.      Anticipated Discharge Disposition (PT): skilled nursing facility

## 2023-08-17 NOTE — PLAN OF CARE
Goal Outcome Evaluation:        Routine stroke order received. Pt passed swallow screen and has been placed on a regular diet. Imaging (CT & MRI) negative for acute findings. Will sign off per protocol, please reconsult if indicated.

## 2023-08-17 NOTE — CASE MANAGEMENT/SOCIAL WORK
Continued Stay Note  MIRLANDE Arrington     Patient Name: Lori Sutton  MRN: [id number]  Today's Date: 8/17/2023    Admit Date: 8/16/2023    Plan: D/C Plan: [facility].  No pre-cert or PASRR required.  Transport TBD.   Discharge Plan       Row Name 08/17/23 0856       Plan    Plan D/C Plan: Return to hospitals rehab.  No pre-cert or PASRR required.  Transport TBD.                     Staci Arevalo RN

## 2023-08-18 VITALS
HEIGHT: 62 IN | HEART RATE: 71 BPM | RESPIRATION RATE: 12 BRPM | DIASTOLIC BLOOD PRESSURE: 88 MMHG | SYSTOLIC BLOOD PRESSURE: 151 MMHG | WEIGHT: 185 LBS | OXYGEN SATURATION: 93 % | BODY MASS INDEX: 34.04 KG/M2 | TEMPERATURE: 97.7 F

## 2023-08-18 PROCEDURE — G0378 HOSPITAL OBSERVATION PER HR: HCPCS

## 2023-08-18 RX ORDER — METOPROLOL SUCCINATE 50 MG/1
50 TABLET, EXTENDED RELEASE ORAL
Status: DISCONTINUED | OUTPATIENT
Start: 2023-08-18 | End: 2023-08-18 | Stop reason: HOSPADM

## 2023-08-18 RX ADMIN — HYDROCODONE BITARTRATE AND ACETAMINOPHEN 1 TABLET: 5; 325 TABLET ORAL at 01:42

## 2023-08-18 RX ADMIN — ALLOPURINOL 300 MG: 300 TABLET ORAL at 09:31

## 2023-08-18 RX ADMIN — Medication 1000 UNITS: at 09:30

## 2023-08-18 RX ADMIN — HYDRALAZINE HYDROCHLORIDE 50 MG: 25 TABLET, FILM COATED ORAL at 09:31

## 2023-08-18 RX ADMIN — LEVOTHYROXINE SODIUM 25 MCG: 0.03 TABLET ORAL at 06:22

## 2023-08-18 RX ADMIN — VALSARTAN 320 MG: 80 TABLET, FILM COATED ORAL at 09:31

## 2023-08-18 RX ADMIN — METOPROLOL SUCCINATE 50 MG: 50 TABLET, EXTENDED RELEASE ORAL at 09:31

## 2023-08-18 RX ADMIN — ASPIRIN 81 MG CHEWABLE TABLET 324 MG: 81 TABLET CHEWABLE at 09:31

## 2023-08-18 RX ADMIN — CITALOPRAM HYDROBROMIDE 20 MG: 20 TABLET ORAL at 09:31

## 2023-08-18 RX ADMIN — HYDROCHLOROTHIAZIDE 25 MG: 25 TABLET ORAL at 09:31

## 2023-08-18 NOTE — NURSING NOTE
Nurse provided the nurse at [facility] report for the patient returning to their facility.  She confirmed she had all information needed to successfully receive patient back into their care.

## 2023-08-18 NOTE — PLAN OF CARE
Problem: Skin Injury Risk Increased  Goal: Skin Health and Integrity  Outcome: Ongoing, Progressing     Problem: Fall Injury Risk  Goal: Absence of Fall and Fall-Related Injury  Outcome: Ongoing, Progressing  Intervention: Identify and Manage Contributors  Recent Flowsheet Documentation  Taken 8/18/2023 0800 by Leonila Zuniga RN  Medication Review/Management: medications reviewed  Intervention: Promote Injury-Free Environment  Recent Flowsheet Documentation  Taken 8/18/2023 0800 by Leonila Zuniga RN  Safety Promotion/Fall Prevention:   fall prevention program maintained   safety round/check completed   Goal Outcome Evaluation:

## 2023-08-18 NOTE — PLAN OF CARE
Fair shift spent, medicated as ordered, neck collar remained in place, had few periods of apneic spells position adjusted, left stable, care and observation continues    Goal Outcome Evaluation:    Problem: Skin Injury Risk Increased  Goal: Skin Health and Integrity  Outcome: Ongoing, Progressing  Intervention: Optimize Skin Protection  Recent Flowsheet Documentation  Taken 8/18/2023 0400 by Robert Espino RN  Head of Bed (HOB) Positioning: HOB at 20-30 degrees  Taken 8/18/2023 0000 by Robert Espino RN  Head of Bed (HOB) Positioning: HOB at 20-30 degrees  Taken 8/17/2023 2000 by Robert Espino RN  Pressure Reduction Techniques: frequent weight shift encouraged  Head of Bed (HOB) Positioning: HOB at 20-30 degrees  Pressure Reduction Devices: pressure-redistributing mattress utilized  Skin Protection: adhesive use limited     Problem: Fall Injury Risk  Goal: Absence of Fall and Fall-Related Injury  Outcome: Ongoing, Progressing  Intervention: Identify and Manage Contributors  Recent Flowsheet Documentation  Taken 8/18/2023 0400 by Robert Espino RN  Medication Review/Management: medications reviewed  Taken 8/18/2023 0200 by Robert Espino RN  Medication Review/Management: medications reviewed  Taken 8/18/2023 0000 by Robert Espino RN  Medication Review/Management: medications reviewed  Taken 8/17/2023 2200 by Robert Espino RN  Medication Review/Management: medications reviewed  Taken 8/17/2023 2000 by Robert Espino RN  Medication Review/Management: medications reviewed  Intervention: Promote Injury-Free Environment  Recent Flowsheet Documentation  Taken 8/18/2023 0400 by Robert Espino RN  Safety Promotion/Fall Prevention:   assistive device/personal items within reach   fall prevention program maintained   safety round/check completed  Taken 8/18/2023 0200 by Robert Espino RN  Safety Promotion/Fall Prevention:   assistive device/personal items within reach   fall prevention program  maintained   safety round/check completed  Taken 8/18/2023 0000 by oRbert Espino RN  Safety Promotion/Fall Prevention:   assistive device/personal items within reach   clutter free environment maintained   nonskid shoes/slippers when out of bed   fall prevention program maintained   safety round/check completed  Taken 8/17/2023 2200 by Robert Espino, RN  Safety Promotion/Fall Prevention:   assistive device/personal items within reach   clutter free environment maintained   fall prevention program maintained   safety round/check completed  Taken 8/17/2023 2000 by Robert Espino RN  Safety Promotion/Fall Prevention:   assistive device/personal items within reach   clutter free environment maintained   fall prevention program maintained   nonskid shoes/slippers when out of bed   room organization consistent   safety round/check completed

## 2023-08-20 LAB — BACTERIA ISLT: NORMAL

## 2023-08-21 NOTE — CASE MANAGEMENT/SOCIAL WORK
Case Management Discharge Note      Final Note: [facility] rehab         Selected Continued Care - Discharged on 8/18/202         Selected Continued Care - Prior Encounters Includes continued care and service providers with selected services from prior encounters from 5/18/2023 to 8/18/2023      Discharged on 8/4/2023 Admission date: 7/31/2023 - Discharge disposition: Rehab Facility or Unit (DC - External)      Destination       Service Provider Selected Services Address Phone Fax Patient Preferred    [facility] [address] [place] [id number] [phone] --                       Transportation Services  W/C Van: Skilled Nursing Facility Van    Final Discharge Disposition Code: 62 - inpatient rehab facility

## 2023-08-22 LAB — BACTERIA ISLT: NORMAL

## 2023-08-24 NOTE — PROGRESS NOTES
Neurosurgical Consultation      Lori Sutton is a 73 y.o. female is here today for a post op follow-up. In the office today patient reports of neck pain with exertion. Numbness in bilateral hands.     Chief Complaint   Patient presents with    Post-op     Sx 8/2        Previous treatment: Cervical 4-6 Laminectomy & Posterior Lateral Fusion 8/2/23    HPI: This is a 73-year-old woman who was found to have progressive cervical myelopathy.  This was life altering.  She is also found to have reversal of her cervical lordosis with severe nondynamic C4-C5 spondylolisthesis.  She did complete physical therapy and this did improve some of her neck pain however her neurologic symptoms continue to progress.  She underwent a C5 corpectomy with a C4-C6 anterior fusion.  She also had a staged C4-C6 laminectomy and posterior lateral fusion.  She has slightly more than 3 weeks removed from this.  She was discharged to acute rehab.  While in acute rehab she had 2 episodes of severe hypotension and was hospitalized for this.  She underwent a stroke work-up as her symptoms included some aphasia and facial droop.  The MRI of her brain was unremarkable for this.  CTA of her vessels was also unremarkable.  She was found to have severe magnesium deficiency.  She has since been transition to subacute rehab.  She is making progress there.  She is walking with a walker.  She is wearing her cervical collar at all times.  Her incisions are well-healing.  Her DAYANA drain stitches still in place.  She does have some persistent numbness and tingling in her arms and hands.  This is stable compared to her preoperative status.    Past Medical History:   Diagnosis Date    Anxiety     Arthritis     Asthma     Constipation     COPD (chronic obstructive pulmonary disease)     DDD (degenerative disc disease), cervical     DDD (degenerative disc disease), lumbar     Disease of thyroid gland     Double vision 08/17/2023    Emphysema lung     Fibrocystic  breast     H/O cervical spine surgery 08/17/2023    Hard of hearing     wears hearing aids bilat    History of cigarette smoking 08/17/2023    Hyperlipidemia     Hypertension     Lymphedema of both lower extremities     red and irritable    Obesity (BMI 30-39.9) 08/17/2023    PONV (postoperative nausea and vomiting)     Seasonal allergies     Sleep apnea     not formally dx- pt O2 drops to mid-70s while asleep    Urinary incontinence         Past Surgical History:   Procedure Laterality Date    APPENDECTOMY  1958    AUGMENTATION MAMMAPLASTY      BREAST CYST ASPIRATION      BREAST CYST EXCISION      CERVICAL FUSION Right 7/31/2023    Procedure: DAY 1 -CERVICAL CORPECTOMY  LEVEL CERVICAL 5 WITH FUSION LEVELS CERVICAL 4-6 ANTERIOR;  Surgeon: Wilber Mcgowan MD;  Location: [facility] MAIN OR;  Service: Neurosurgery;  Laterality: Right;    CERVICAL FUSION Right 8/2/2023    Procedure: DAY 2: CERVICAL 4-6 LAMINECTOMY & POSTERIOR LATERAL FUSION WITH INSTRUMENTATION 3-4 LEVELS;  Surgeon: Wilber Mcgowan MD;  Location: [facility] MAIN OR;  Service: Neurosurgery;  Laterality: Right;    HYSTERECTOMY      THYROIDECTOMY, PARTIAL      TONSILLECTOMY  1958        Current Outpatient Medications on File Prior to Visit   Medication Sig Dispense Refill    ALBUTEROL SULFATE  (90 Base) MCG/ACT inhaler INHALE 2 PUFFS BY MOUTH EVERY 4 HOURS AS NEEDED FOR WHEEZING 42.5 g 2    allopurinol (ZYLOPRIM) 300 MG tablet TAKE 1 TABLET BY MOUTH  DAILY 90 tablet 3    aspirin 81 MG EC tablet Take 1 tablet by mouth Daily. 30 tablet 0    atorvastatin (Lipitor) 40 MG tablet Take 1 tablet by mouth Every Night. 30 tablet 0    baclofen (LIORESAL) 10 MG tablet Take 1 tablet by mouth 3 (Three) Times a Day.      Cholecalciferol 1000 units tablet Take 1 tablet by mouth Daily.      citalopram (CeleXA) 20 MG tablet TAKE 1 TABLET BY MOUTH  DAILY 90 tablet 3    hydrALAZINE (APRESOLINE) 50 MG tablet TAKE 1 TABLET BY MOUTH 3  TIMES DAILY 270 tablet 3     HYDROcodone-acetaminophen (NORCO) 5-325 MG per tablet Take 1 tablet by mouth Every 4 (Four) Hours As Needed.      levothyroxine (SYNTHROID, LEVOTHROID) 25 MCG tablet TAKE 1 TABLET BY MOUTH  DAILY 90 tablet 3    metoprolol succinate XL (Toprol XL) 50 MG 24 hr tablet Take 1 tablet by mouth Daily. 30 tablet 0    montelukast (SINGULAIR) 10 MG tablet TAKE 1 TABLET BY MOUTH  DAILY 90 tablet 3    Restasis 0.05 % ophthalmic emulsion Administer 1 drop to both eyes Daily As Needed.      triamcinolone (KENALOG) 0.5 % ointment APPLY TOPICALLY TO THE  APPROPRIATE AREA AS  DIRECTED TWICE DAILY 90 g 1    valsartan-hydrochlorothiazide (DIOVAN-HCT) 320-25 MG per tablet Take 1 tablet by mouth Daily. 90 tablet 3     No current facility-administered medications on file prior to visit.        Allergies   Allergen Reactions    Nsaids GI Bleeding    Lisinopril Cough        Social History     Socioeconomic History    Marital status:    Tobacco Use    Smoking status: Former     Packs/day: 1.50     Years: 40.00     Pack years: 60.00     Types: Cigarettes     Start date: 1965     Quit date: 2006     Years since quittin.6    Smokeless tobacco: Never   Vaping Use    Vaping Use: Never used   Substance and Sexual Activity    Alcohol use: Yes     Alcohol/week: 11.0 standard drinks     Types: 4 Glasses of wine, 7 Shots of liquor per week     Comment: daily, vodka tonic x 8    Drug use: No    Sexual activity: Defer     Birth control/protection: Post-menopausal          Review of Systems   Constitutional:  Positive for activity change.   HENT: Negative.     Eyes: Negative.    Respiratory: Negative.     Cardiovascular: Negative.    Gastrointestinal: Negative.    Endocrine: Negative.    Genitourinary: Negative.    Musculoskeletal:  Positive for myalgias and neck pain.   Skin: Negative.    Allergic/Immunologic: Negative.    Neurological:  Positive for numbness (Bilateral hands). Negative for weakness.   Hematological: Negative.   "  Psychiatric/Behavioral:  Positive for sleep disturbance.       Physical Examination:     Vitals:    08/29/23 1415   BP: 115/72   BP Location: Right arm   Patient Position: Sitting   Cuff Size: Adult   Pulse: 89   Resp: 18   Weight: 83.9 kg (185 lb)   Height: 157.5 cm (62\")   PainSc:   3   PainLoc: Neck        Physical Exam     Neurological Exam   Neurological examination appears stable compared to my in-hospital evaluation.  I do not appreciate any new red flag signs.  The posterior cervical incision is well-healing.  The DAYANA drain stitches still in place without any worrisome findings at this location.  Her anterior incision is well-healing without any signs of breakdown or infection.  She does not have a Lorenzo's response on either side.  She has good  strength.  She is unable to arise without the assistance of her walker.    Result Review  The following data was reviewed by: Wilber Mcgowan MD on 08/29/2023:    Data reviewed : Radiologic studies CT of the cervical spine accomplished for stroke work-up shows good hardware placement without any signs of severe hardware complications.  One of the anterior cervical screws does appear to traverse the disc space.  There appears to be successful decompression of the spinal canal from bony stenosis.      Assessment/plan:  This is a 73-year-old woman with severe progressive cervical myelopathy as well as kyphotic deformity who underwent a C5 corpectomy with C4-C6 anterior fusion as well as C4-C6 posterior laminectomy and posterior lateral fusion.  She is approximately 3-1/2 weeks removed from this.  She should continue wearing the cervical collar at all times.  Her incisions are well-healing.  The DAYANA stitch was removed in the office today.  She should continue physical therapy at the nursing home and this should be transitioned into the outpatient setting.  I will have her return to see me in 4 weeks with x-rays of the cervical spine at that juncture.  I have " encouraged her to call with any questions or concerns.    Diagnoses and all orders for this visit:    1. S/P cervical spinal fusion (Primary)  -     XR Spine Cervical Complete 4 or 5 View; Future  -     Ambulatory Referral to Physical Therapy POST OP    2. Cervical spondylosis with myelopathy  -     XR Spine Cervical Complete 4 or 5 View; Future  -     Ambulatory Referral to Physical Therapy POST OP         Return in about 4 weeks (around 9/26/2023).            Wilber Mcgowan MD

## 2023-08-29 ENCOUNTER — OFFICE VISIT (OUTPATIENT)
Dept: NEUROSURGERY | Facility: CLINIC | Age: 73
End: 2023-08-29
Payer: MEDICARE

## 2023-08-29 VITALS
HEIGHT: 62 IN | BODY MASS INDEX: 34.04 KG/M2 | DIASTOLIC BLOOD PRESSURE: 72 MMHG | WEIGHT: 185 LBS | SYSTOLIC BLOOD PRESSURE: 115 MMHG | RESPIRATION RATE: 18 BRPM | HEART RATE: 89 BPM

## 2023-08-29 DIAGNOSIS — M47.12 CERVICAL SPONDYLOSIS WITH MYELOPATHY: ICD-10-CM

## 2023-08-29 DIAGNOSIS — Z98.1 S/P CERVICAL SPINAL FUSION: Primary | ICD-10-CM

## 2023-08-29 PROBLEM — J44.9 COPD (CHRONIC OBSTRUCTIVE PULMONARY DISEASE): Status: ACTIVE | Noted: 2023-08-29

## 2023-08-29 PROCEDURE — 3078F DIAST BP <80 MM HG: CPT | Performed by: NEUROLOGICAL SURGERY

## 2023-08-29 PROCEDURE — 99024 POSTOP FOLLOW-UP VISIT: CPT | Performed by: NEUROLOGICAL SURGERY

## 2023-08-29 PROCEDURE — 1160F RVW MEDS BY RX/DR IN RCRD: CPT | Performed by: NEUROLOGICAL SURGERY

## 2023-08-29 PROCEDURE — 3074F SYST BP LT 130 MM HG: CPT | Performed by: NEUROLOGICAL SURGERY

## 2023-08-29 PROCEDURE — 1159F MED LIST DOCD IN RCRD: CPT | Performed by: NEUROLOGICAL SURGERY

## 2023-08-30 ENCOUNTER — TELEPHONE (OUTPATIENT)
Dept: NEUROSURGERY | Facility: CLINIC | Age: 73
End: 2023-08-30
Payer: MEDICARE

## 2023-08-30 NOTE — TELEPHONE ENCOUNTER
Katya from [employer] called and they are needing the OV note from yesterday. Fax number is [phone].

## 2023-09-01 ENCOUNTER — TELEPHONE (OUTPATIENT)
Dept: NEUROLOGY | Facility: CLINIC | Age: 73
End: 2023-09-01

## 2023-09-05 ENCOUNTER — TELEPHONE (OUTPATIENT)
Dept: FAMILY MEDICINE CLINIC | Facility: CLINIC | Age: 73
End: 2023-09-05

## 2023-09-05 NOTE — TELEPHONE ENCOUNTER
Caller: Lori Sutton    Relationship to patient: Self    Best call back number: [phone]    Patient is needing:PATIENT STATES SHE WILL BE SWITCHING HER CARE TO A NEW PRIMARY DOCTOR THAT IS LOCATED CLOSER TO HER HOME. THIS IS THE ONLY REASON SHE IS CHANGING PROVIDERS. PATIENT WANTED TO LET DR QUEEN KNOW

## 2023-09-08 ENCOUNTER — READMISSION MANAGEMENT (OUTPATIENT)
Dept: CALL CENTER | Facility: HOSPITAL | Age: 73
End: 2023-09-08
Payer: MEDICARE

## 2023-09-09 NOTE — OUTREACH NOTE
Prep Survey      Flowsheet Row Responses   [facility] facility patient discharged from? Non-BH   Is LACE score < 7 ? Non-BH Discharge   Eligibility [facility]   Date of Discharge 09/08/23   Discharge diagnosis Encounter for other orthopedic aftercare   Does the patient have one of the following disease processes/diagnoses(primary or secondary)? Other   Prep survey completed? Yes            ALONDRA MERRILL - Registered Nurse

## 2023-09-10 ENCOUNTER — TRANSITIONAL CARE MANAGEMENT TELEPHONE ENCOUNTER (OUTPATIENT)
Dept: CALL CENTER | Facility: HOSPITAL | Age: 73
End: 2023-09-10
Payer: MEDICARE

## 2023-09-10 NOTE — OUTREACH NOTE
Call Center TCM Note      Flowsheet Row Responses   [facility] [place] operated by [facility] patient discharged from? Non-BH   Does the patient have one of the following disease processes/diagnoses(primary or secondary)? Other   TCM attempt successful? No   Unsuccessful attempts Attempt 1            Henrietta Bravo RN    9/10/2023, 12:12 EDT

## 2023-09-11 ENCOUNTER — TRANSITIONAL CARE MANAGEMENT TELEPHONE ENCOUNTER (OUTPATIENT)
Dept: CALL CENTER | Facility: HOSPITAL | Age: 73
End: 2023-09-11
Payer: MEDICARE

## 2023-09-11 NOTE — OUTREACH NOTE
Call Center TCM Note      Flowsheet Row Responses   [facility] patient discharged from? Non-BH   Does the patient have one of the following disease processes/diagnoses(primary or secondary)? Other   TCM attempt successful? No   Unsuccessful attempts Attempt 2            Stacey Varela MA    9/11/2023, 11:45 EDT

## 2023-09-12 ENCOUNTER — TRANSITIONAL CARE MANAGEMENT TELEPHONE ENCOUNTER (OUTPATIENT)
Dept: CALL CENTER | Facility: HOSPITAL | Age: 73
End: 2023-09-12
Payer: MEDICARE

## 2023-09-12 NOTE — OUTREACH NOTE
Call Center TCM Note      Flowsheet Row Responses   [facility] patient discharged from? Non-BH   Does the patient have one of the following disease processes/diagnoses(primary or secondary)? Other   TCM attempt successful? No   Unsuccessful attempts Attempt 3   Wrap up additional comments D/C DX : TIA - Unable to reach pt x 3 attempts forTCM call. Pt d/c from [facility] 09/08/2023. Pt is not yet sched for TCM APPT with PCP Dr Ayaz Luna.            Stacey Varela MA    9/12/2023, 10:45 EDT

## 2023-09-25 ENCOUNTER — HOSPITAL ENCOUNTER (OUTPATIENT)
Dept: GENERAL RADIOLOGY | Facility: HOSPITAL | Age: 73
Discharge: HOME | End: 2023-09-25
Admitting: NEUROLOGICAL SURGERY
Payer: MEDICARE

## 2023-09-25 DIAGNOSIS — M47.12 CERVICAL SPONDYLOSIS WITH MYELOPATHY: ICD-10-CM

## 2023-09-25 DIAGNOSIS — Z98.1 S/P CERVICAL SPINAL FUSION: ICD-10-CM

## 2023-09-25 PROCEDURE — 72050 X-RAY EXAM NECK SPINE 4/5VWS: CPT

## 2023-09-27 PROBLEM — I65.29 CAROTID ARTERY PLAQUE: Status: ACTIVE | Noted: 2023-09-11

## 2023-09-27 PROBLEM — Z47.89 ENCOUNTER FOR OTHER ORTHOPEDIC AFTERCARE: Status: ACTIVE | Noted: 2023-09-08

## 2023-09-27 PROBLEM — G62.9 POLYNEUROPATHY, UNSPECIFIED: Status: ACTIVE | Noted: 2023-08-27

## 2023-09-27 PROBLEM — K21.9 GERD (GASTROESOPHAGEAL REFLUX DISEASE): Status: ACTIVE | Noted: 2023-09-11

## 2023-09-27 PROBLEM — J43.8 OTHER EMPHYSEMA: Status: ACTIVE | Noted: 2023-08-27

## 2023-09-27 PROBLEM — I50.30 (HFPEF) HEART FAILURE WITH PRESERVED EJECTION FRACTION: Status: ACTIVE | Noted: 2023-09-11

## 2023-09-27 PROBLEM — E83.42 HYPOMAGNESEMIA: Status: ACTIVE | Noted: 2023-09-20

## 2023-09-27 PROBLEM — M62.81 MUSCLE WEAKNESS (GENERALIZED): Status: ACTIVE | Noted: 2023-08-27

## 2023-09-27 PROBLEM — K57.90 DIVERTICULOSIS: Status: ACTIVE | Noted: 2023-09-11

## 2023-09-27 PROBLEM — Z98.1 ARTHRODESIS STATUS: Status: ACTIVE | Noted: 2023-08-27

## 2023-09-27 PROBLEM — I25.10 CORONARY ARTERY CALCIFICATION SEEN ON CAT SCAN: Status: ACTIVE | Noted: 2023-09-11

## 2023-09-27 PROBLEM — I10 ESSENTIAL (PRIMARY) HYPERTENSION: Status: ACTIVE | Noted: 2023-08-27

## 2023-09-27 PROBLEM — D64.9 NORMOCYTIC ANEMIA: Status: ACTIVE | Noted: 2023-09-20

## 2023-09-27 PROBLEM — M50.00 INTERVERTEBRAL CERVICAL DISC DISORDER WITH MYELOPATHY, CERVICAL REGION: Status: ACTIVE | Noted: 2023-08-27

## 2023-09-27 PROBLEM — R26.89 OTHER ABNORMALITIES OF GAIT AND MOBILITY: Status: ACTIVE | Noted: 2023-08-27

## 2023-09-27 PROBLEM — E03.9 HYPOTHYROIDISM: Status: ACTIVE | Noted: 2023-09-11

## 2023-09-27 NOTE — PROGRESS NOTES
Neurosurgical Consultation      Lorisergio Sutton is a 73 y.o. female is here today for a post op follow-up. Today patient reports having some soreness and stiffness in her neck.     Chief Complaint   Patient presents with    Post-op     Follow up         Previous treatment:DAY 1 -CERVICAL CORPECTOMY  LEVEL CERVICAL 5 WITH FUSION LEVELS CERVICAL 4-6 ANTERIOR.  DAY 2: CERVICAL 4-6 LAMINECTOMY & POSTERIOR LATERAL FUSION WITH INSTRUMENTATION 3-4 LEVELS.    HPI: This is a 73-year-old woman who was found to have severe progressive cervical myelopathy.  She had severe reversal of cervical lordosis with severe nondynamic C4-C5 spondylolisthesis.  She failed conservative therapies to address her cervical myelopathy.  She underwent a C5 corpectomy with a C4-C6 anterior fusion as well as a staged C4-C6 laminectomy and posterior lateral fusion.  She is almost 2 months removed from surgery.  She is doing quite well.  She has had improvement in the numbness in her hands.  She has increased dexterity in her hands.  She is ambulating with less balance issues and instability.  She did not require a cane in order to traverse this clinic appointment.  She has been wearing the cervical collar.  She does note that she has not started outpatient physical therapy yet however is scheduled to begin next week.  Her incisions are both well-healing.  She has not had any significant wound related issues.    Past Medical History:   Diagnosis Date    Anxiety     Arthritis     Asthma     Constipation     COPD (chronic obstructive pulmonary disease)     DDD (degenerative disc disease), cervical     DDD (degenerative disc disease), lumbar     Disease of thyroid gland     Double vision 08/17/2023    Emphysema lung     Fibrocystic breast     H/O cervical spine surgery 08/17/2023    Hard of hearing     wears hearing aids bilat    History of cigarette smoking 08/17/2023    Hyperlipidemia     Hypertension     Lymphedema of both lower extremities     red  and irritable    Obesity (BMI 30-39.9) 08/17/2023    PONV (postoperative nausea and vomiting)     Seasonal allergies     Sleep apnea     not formally dx- pt O2 drops to mid-70s while asleep    Urinary incontinence         Past Surgical History:   Procedure Laterality Date    APPENDECTOMY  1958    AUGMENTATION MAMMAPLASTY      BREAST CYST ASPIRATION      BREAST CYST EXCISION      CERVICAL FUSION Right 7/31/2023    Procedure: DAY 1 -CERVICAL CORPECTOMY  LEVEL CERVICAL 5 WITH FUSION LEVELS CERVICAL 4-6 ANTERIOR;  Surgeon: Wilber Mcgowan MD;  Location: [facility] MAIN OR;  Service: Neurosurgery;  Laterality: Right;    CERVICAL FUSION Right 8/2/2023    Procedure: DAY 2: CERVICAL 4-6 LAMINECTOMY & POSTERIOR LATERAL FUSION WITH INSTRUMENTATION 3-4 LEVELS;  Surgeon: Wilber Mcgowan MD;  Location: [facility] MAIN OR;  Service: Neurosurgery;  Laterality: Right;    HYSTERECTOMY      THYROIDECTOMY, PARTIAL      TONSILLECTOMY  1958        Current Outpatient Medications on File Prior to Visit   Medication Sig Dispense Refill    ALBUTEROL SULFATE  (90 Base) MCG/ACT inhaler INHALE 2 PUFFS BY MOUTH EVERY 4 HOURS AS NEEDED FOR WHEEZING 42.5 g 2    allopurinol (ZYLOPRIM) 300 MG tablet TAKE 1 TABLET BY MOUTH  DAILY 90 tablet 3    aspirin 81 MG EC tablet Take 1 tablet by mouth Daily. 30 tablet 0    atorvastatin (Lipitor) 40 MG tablet Take 1 tablet by mouth Every Night. 30 tablet 0    baclofen (LIORESAL) 10 MG tablet Take 1 tablet by mouth 3 (Three) Times a Day.      Cholecalciferol 1000 units tablet Take 1 tablet by mouth Daily.      citalopram (CeleXA) 20 MG tablet TAKE 1 TABLET BY MOUTH  DAILY 90 tablet 3    hydrALAZINE (APRESOLINE) 50 MG tablet TAKE 1 TABLET BY MOUTH 3  TIMES DAILY 270 tablet 3    HYDROcodone-acetaminophen (NORCO) 5-325 MG per tablet Take 1 tablet by mouth Every 4 (Four) Hours As Needed.      levothyroxine (SYNTHROID, LEVOTHROID) 25 MCG tablet TAKE 1 TABLET BY MOUTH  DAILY 90 tablet 3    metoprolol succinate XL  "(Toprol XL) 50 MG 24 hr tablet Take 1 tablet by mouth Daily. 30 tablet 0    montelukast (SINGULAIR) 10 MG tablet TAKE 1 TABLET BY MOUTH  DAILY 90 tablet 3    Restasis 0.05 % ophthalmic emulsion Administer 1 drop to both eyes Daily As Needed.      triamcinolone (KENALOG) 0.5 % ointment APPLY TOPICALLY TO THE  APPROPRIATE AREA AS  DIRECTED TWICE DAILY 90 g 1    valsartan-hydrochlorothiazide (DIOVAN-HCT) 320-25 MG per tablet Take 1 tablet by mouth Daily. 90 tablet 3     No current facility-administered medications on file prior to visit.        Allergies   Allergen Reactions    Nsaids GI Bleeding    Lisinopril Cough        Social History     Socioeconomic History    Marital status:    Tobacco Use    Smoking status: Former     Packs/day: 1.50     Years: 40.00     Pack years: 60.00     Types: Cigarettes     Start date: 1965     Quit date: 2006     Years since quittin.7    Smokeless tobacco: Never   Vaping Use    Vaping Use: Never used   Substance and Sexual Activity    Alcohol use: Yes     Alcohol/week: 11.0 standard drinks     Types: 4 Glasses of wine, 7 Shots of liquor per week     Comment: daily, vodka tonic x 8    Drug use: No    Sexual activity: Defer     Birth control/protection: Post-menopausal          Review of Systems   Constitutional:  Positive for activity change.   HENT: Negative.     Eyes: Negative.    Respiratory: Negative.     Cardiovascular: Negative.    Gastrointestinal: Negative.    Endocrine: Negative.    Genitourinary: Negative.    Musculoskeletal:  Positive for myalgias (muscle soreness) and neck stiffness.   Skin: Negative.    Allergic/Immunologic: Negative.    Neurological:  Positive for numbness (bilateral thumbs).   Hematological: Negative.    Psychiatric/Behavioral:  Positive for sleep disturbance.       Physical Examination:     Vitals:    23 1311   BP: 126/74   Pulse: 87   Weight: 76.2 kg (168 lb)   Height: 157.5 cm (62\")   PainSc: 0-No pain        Physical Exam "     Neurological Exam   Neurological examination is improved compared to my prior evaluation with less numbness in her hands and improved strength in her .  There is no Lorenzo's response on either side.  Both incisions are well-healing without any signs of breakdown or infection.    Result Review  The following data was reviewed by: Wilber Mcgowan MD on 09/28/2023:    Data reviewed : Radiologic studies cervical x-rays are indicative of stable hardware without any signs of new hardware complications or other acute issues.      Assessment/plan:  This is a 73-year-old woman who underwent a anterior cervical corpectomy and fusion followed by a posterior laminectomy and fusion for progressive cervical myelopathy.  She is almost 2 months removed from surgical intervention.  She is doing well.  Her pain is well controlled.  She has had improvement in her myelopathic symptoms including less numbness and improved gait.  She also has improved strength in her .  Her x-rays appear unremarkable today without any indication of hardware complications.  At this juncture I recommend engaging fully with outpatient physical therapy including exercises at home.  She is able to now sleep without the cervical collar.  She will follow-up with me in approximately 2 months with x-rays at that juncture.  At that juncture we may be able to completely remove the cervical collar.  I encouraged her to call with any questions or concerns.  Diagnoses and all orders for this visit:    1. S/P cervical spinal fusion (Primary)  -     XR Spine Cervical Complete 4 or 5 View; Future    2. Cervical spondylosis with myelopathy  -     XR Spine Cervical Complete 4 or 5 View; Future         Return in about 2 months (around 11/28/2023).            Wilber Mcgowan MD

## 2023-09-28 ENCOUNTER — OFFICE VISIT (OUTPATIENT)
Dept: NEUROSURGERY | Facility: CLINIC | Age: 73
End: 2023-09-28
Payer: MEDICARE

## 2023-09-28 VITALS
WEIGHT: 168 LBS | DIASTOLIC BLOOD PRESSURE: 74 MMHG | SYSTOLIC BLOOD PRESSURE: 126 MMHG | HEIGHT: 62 IN | HEART RATE: 87 BPM | BODY MASS INDEX: 30.91 KG/M2

## 2023-09-28 DIAGNOSIS — Z98.1 S/P CERVICAL SPINAL FUSION: Primary | ICD-10-CM

## 2023-09-28 DIAGNOSIS — M47.12 CERVICAL SPONDYLOSIS WITH MYELOPATHY: ICD-10-CM

## 2023-11-06 NOTE — PROGRESS NOTES
Neurosurgical Consultation      Lorisergio Sutton is a 73 y.o. female is here today for follow-up after sx. In the office today patient reports of improved pain but twinges every once in awhile mostly on the right side. Right arm and wrist is sore.     Chief Complaint   Patient presents with    Neck Pain     Follow up         Previous treatment: DAY 1 -CERVICAL CORPECTOMY  LEVEL CERVICAL 5 WITH FUSION LEVELS CERVICAL 4-6 ANTERIOR.  DAY 2: CERVICAL 4-6 LAMINECTOMY & POSTERIOR LATERAL FUSION WITH INSTRUMENTATION 3-4 LEVELS    HPI: This is a 73-year-old woman who was initially found to have severe progressive cervical myelopathy.  She underwent a C5 corpectomy with a C4-C6 anterior fusion as well as a staged C4-C6 laminectomy and posterior lateral fusion.  She is slightly more than 3 months removed from surgical intervention.  She has had improvement in her myelopathy symptoms.  She has been working with physical therapy.  She has been having increased joint pain in her hands in particular the right thumb joint.  This has affected her dexterity.  She does not have any new numbness or tingling.  Her incisions are well-healing.  She has been out of the cervical collar.    Past Medical History:   Diagnosis Date    Anxiety     Arthritis     Asthma     Constipation     COPD (chronic obstructive pulmonary disease)     DDD (degenerative disc disease), cervical     DDD (degenerative disc disease), lumbar     Disease of thyroid gland     Double vision 08/17/2023    Emphysema lung     Fibrocystic breast     H/O cervical spine surgery 08/17/2023    Hard of hearing     wears hearing aids bilat    History of cigarette smoking 08/17/2023    Hyperlipidemia     Hypertension     Lymphedema of both lower extremities     red and irritable    Obesity (BMI 30-39.9) 08/17/2023    PONV (postoperative nausea and vomiting)     Seasonal allergies     Sleep apnea     not formally dx- pt O2 drops to mid-70s while asleep    Urinary incontinence          Past Surgical History:   Procedure Laterality Date    APPENDECTOMY  1958    AUGMENTATION MAMMAPLASTY      BREAST CYST ASPIRATION      BREAST CYST EXCISION      CERVICAL FUSION Right 7/31/2023    Procedure: DAY 1 -CERVICAL CORPECTOMY  LEVEL CERVICAL 5 WITH FUSION LEVELS CERVICAL 4-6 ANTERIOR;  Surgeon: Wilber Mcgowan MD;  Location: [facility] MAIN OR;  Service: Neurosurgery;  Laterality: Right;    CERVICAL FUSION Right 8/2/2023    Procedure: DAY 2: CERVICAL 4-6 LAMINECTOMY & POSTERIOR LATERAL FUSION WITH INSTRUMENTATION 3-4 LEVELS;  Surgeon: Wilber Mcgowan MD;  Location: [facility] MAIN OR;  Service: Neurosurgery;  Laterality: Right;    HYSTERECTOMY      THYROIDECTOMY, PARTIAL      TONSILLECTOMY  1958        Current Outpatient Medications on File Prior to Visit   Medication Sig Dispense Refill    ALBUTEROL SULFATE  (90 Base) MCG/ACT inhaler INHALE 2 PUFFS BY MOUTH EVERY 4 HOURS AS NEEDED FOR WHEEZING 42.5 g 2    allopurinol (ZYLOPRIM) 300 MG tablet TAKE 1 TABLET BY MOUTH  DAILY 90 tablet 3    aspirin 81 MG EC tablet Take 1 tablet by mouth Daily. 30 tablet 0    atorvastatin (Lipitor) 40 MG tablet Take 1 tablet by mouth Every Night. 30 tablet 0    baclofen (LIORESAL) 10 MG tablet Take 1 tablet by mouth 3 (Three) Times a Day.      Cholecalciferol 1000 units tablet Take 1 tablet by mouth Daily.      citalopram (CeleXA) 20 MG tablet TAKE 1 TABLET BY MOUTH  DAILY 90 tablet 3    hydrALAZINE (APRESOLINE) 50 MG tablet TAKE 1 TABLET BY MOUTH 3  TIMES DAILY 270 tablet 3    hydroCHLOROthiazide (HYDRODIURIL) 25 MG tablet Take 1 tablet by mouth Daily.      HYDROcodone-acetaminophen (NORCO) 5-325 MG per tablet Take 1 tablet by mouth Every 4 (Four) Hours As Needed.      levothyroxine (SYNTHROID, LEVOTHROID) 25 MCG tablet TAKE 1 TABLET BY MOUTH  DAILY 90 tablet 3    metoprolol succinate XL (Toprol XL) 50 MG 24 hr tablet Take 1 tablet by mouth Daily. 30 tablet 0    montelukast (SINGULAIR) 10 MG tablet TAKE 1 TABLET BY MOUTH   "DAILY 90 tablet 3    Restasis 0.05 % ophthalmic emulsion Administer 1 drop to both eyes Daily As Needed.      triamcinolone (KENALOG) 0.5 % ointment APPLY TOPICALLY TO THE  APPROPRIATE AREA AS  DIRECTED TWICE DAILY 90 g 1    [DISCONTINUED] valsartan-hydrochlorothiazide (DIOVAN-HCT) 320-25 MG per tablet Take 1 tablet by mouth Daily. 90 tablet 3     No current facility-administered medications on file prior to visit.        Allergies   Allergen Reactions    Nsaids GI Bleeding    Lisinopril Cough        Social History     Socioeconomic History    Marital status:    Tobacco Use    Smoking status: Former     Packs/day: 1.50     Years: 40.00     Additional pack years: 0.00     Total pack years: 60.00     Types: Cigarettes     Start date: 1965     Quit date: 2006     Years since quittin.8    Smokeless tobacco: Never   Vaping Use    Vaping Use: Never used   Substance and Sexual Activity    Alcohol use: Yes     Alcohol/week: 11.0 standard drinks of alcohol     Types: 4 Glasses of wine, 7 Shots of liquor per week     Comment: daily, vodka tonic x 8    Drug use: No    Sexual activity: Defer     Birth control/protection: Post-menopausal          Review of Systems   Constitutional:  Positive for activity change.   Musculoskeletal:  Positive for arthralgias and neck pain.   Skin: Negative.    Neurological:  Negative for weakness and numbness.   Psychiatric/Behavioral:  Negative for sleep disturbance.         Physical Examination:     Vitals:    23 1322   BP: 155/88   BP Location: Left arm   Patient Position: Sitting   Cuff Size: Adult   Pulse: 82   Resp: 18   Weight: 76.9 kg (169 lb 9.6 oz)   Height: 157.5 cm (62\")   PainSc:   2   PainLoc: Neck        Physical Exam     Neurological Exam   Neurological examination appears stable compared to my last evaluation.  She has good strength in her hands however pain in particular with her right hand.  She does not have a Lorenzo's response on either side.  Her " incisions are well-healed.    Result Review  The following data was reviewed by: Wilber Mcgowan MD on 11/14/2023:    Data reviewed : Radiologic studies x-ray of the cervical spine is pending.      Assessment/plan:  This is a 73-year-old woman with progressive cervical myelopathy status post staged cervical C5 corpectomy with anterior fixation as well as posterior laminectomy and fusion.  She is slightly more than 3 months removed from this.  She is doing well from a neurosurgical perspective.  She has been having increasing hand joint pain in particular in the right thumb joint.  I am going to refer her to hand specialist to evaluate this.  I recommend she complete x-rays today.  She should follow-up with me in 3 months with a CT of the cervical spine.  I have encouraged her to call with any questions or concerns.    Diagnoses and all orders for this visit:    1. S/P cervical spinal fusion (Primary)  -     CT Cervical Spine Without Contrast; Future    2. Cervical spondylosis with myelopathy  -     CT Cervical Spine Without Contrast; Future    3. Hand arthritis  -     Ambulatory Referral to Hand Surgery         Return in about 3 months (around 2/14/2024).            Wilber Mcgowan MD

## 2023-11-13 ENCOUNTER — TELEPHONE (OUTPATIENT)
Dept: NEUROSURGERY | Facility: CLINIC | Age: 73
End: 2023-11-13
Payer: MEDICARE

## 2023-11-13 NOTE — TELEPHONE ENCOUNTER
Called patient and left a VM.     Patient will need to get her XR prior to appt with Dr. Mcgowan on 11/14/23.     OK FOR HUB TO RELAY

## 2023-11-14 ENCOUNTER — OFFICE VISIT (OUTPATIENT)
Dept: NEUROSURGERY | Facility: CLINIC | Age: 73
End: 2023-11-14
Payer: MEDICARE

## 2023-11-14 VITALS
RESPIRATION RATE: 18 BRPM | SYSTOLIC BLOOD PRESSURE: 155 MMHG | BODY MASS INDEX: 31.21 KG/M2 | HEIGHT: 62 IN | HEART RATE: 82 BPM | DIASTOLIC BLOOD PRESSURE: 88 MMHG | WEIGHT: 169.6 LBS

## 2023-11-14 DIAGNOSIS — Z98.1 S/P CERVICAL SPINAL FUSION: Primary | ICD-10-CM

## 2023-11-14 DIAGNOSIS — M19.049 HAND ARTHRITIS: ICD-10-CM

## 2023-11-14 DIAGNOSIS — M47.12 CERVICAL SPONDYLOSIS WITH MYELOPATHY: ICD-10-CM

## 2023-11-14 PROCEDURE — 3077F SYST BP >= 140 MM HG: CPT | Performed by: NEUROLOGICAL SURGERY

## 2023-11-14 PROCEDURE — 3079F DIAST BP 80-89 MM HG: CPT | Performed by: NEUROLOGICAL SURGERY

## 2023-11-14 PROCEDURE — 1159F MED LIST DOCD IN RCRD: CPT | Performed by: NEUROLOGICAL SURGERY

## 2023-11-14 PROCEDURE — 1160F RVW MEDS BY RX/DR IN RCRD: CPT | Performed by: NEUROLOGICAL SURGERY

## 2023-11-14 PROCEDURE — 99214 OFFICE O/P EST MOD 30 MIN: CPT | Performed by: NEUROLOGICAL SURGERY

## 2023-11-14 RX ORDER — HYDROCHLOROTHIAZIDE 25 MG/1
25 TABLET ORAL DAILY
COMMUNITY
Start: 2023-11-10 | End: 2024-11-09

## 2023-11-21 ENCOUNTER — HOSPITAL ENCOUNTER (OUTPATIENT)
Dept: GENERAL RADIOLOGY | Facility: HOSPITAL | Age: 73
Discharge: HOME | End: 2023-11-21
Admitting: NEUROLOGICAL SURGERY
Payer: MEDICARE

## 2023-11-21 DIAGNOSIS — Z98.1 S/P CERVICAL SPINAL FUSION: ICD-10-CM

## 2023-11-21 DIAGNOSIS — M47.12 CERVICAL SPONDYLOSIS WITH MYELOPATHY: ICD-10-CM

## 2023-11-21 PROCEDURE — 72050 X-RAY EXAM NECK SPINE 4/5VWS: CPT

## 2024-02-14 ENCOUNTER — HOSPITAL ENCOUNTER (OUTPATIENT)
Dept: CT IMAGING | Facility: HOSPITAL | Age: 74
Discharge: HOME | End: 2024-02-14
Admitting: NEUROLOGICAL SURGERY
Payer: MEDICARE

## 2024-02-14 DIAGNOSIS — Z98.1 S/P CERVICAL SPINAL FUSION: ICD-10-CM

## 2024-02-14 DIAGNOSIS — M47.12 CERVICAL SPONDYLOSIS WITH MYELOPATHY: ICD-10-CM

## 2024-02-14 PROCEDURE — 72125 CT NECK SPINE W/O DYE: CPT

## 2024-02-15 ENCOUNTER — OFFICE VISIT (OUTPATIENT)
Dept: NEUROSURGERY | Facility: CLINIC | Age: 74
End: 2024-02-15
Payer: MEDICARE

## 2024-02-15 VITALS
SYSTOLIC BLOOD PRESSURE: 134 MMHG | HEART RATE: 96 BPM | DIASTOLIC BLOOD PRESSURE: 85 MMHG | WEIGHT: 173.7 LBS | HEIGHT: 62 IN | BODY MASS INDEX: 31.96 KG/M2

## 2024-02-15 DIAGNOSIS — M47.12 CERVICAL SPONDYLOSIS WITH MYELOPATHY: ICD-10-CM

## 2024-02-15 DIAGNOSIS — Z98.1 S/P CERVICAL SPINAL FUSION: Primary | ICD-10-CM

## 2024-02-22 ENCOUNTER — APPOINTMENT (OUTPATIENT)
Dept: CT IMAGING | Facility: HOSPITAL | Age: 74
End: 2024-02-22
Payer: MEDICARE

## 2024-02-22 ENCOUNTER — HOSPITAL ENCOUNTER (EMERGENCY)
Facility: HOSPITAL | Age: 74
Discharge: HOME | End: 2024-02-22
Attending: EMERGENCY MEDICINE | Admitting: EMERGENCY MEDICINE
Payer: MEDICARE

## 2024-02-22 VITALS
DIASTOLIC BLOOD PRESSURE: 84 MMHG | RESPIRATION RATE: 18 BRPM | TEMPERATURE: 98.3 F | BODY MASS INDEX: 32.59 KG/M2 | SYSTOLIC BLOOD PRESSURE: 160 MMHG | WEIGHT: 177.1 LBS | HEIGHT: 62 IN | HEART RATE: 80 BPM | OXYGEN SATURATION: 94 %

## 2024-02-22 DIAGNOSIS — K11.20 SIALOADENITIS OF SUBMANDIBULAR GLAND: Primary | ICD-10-CM

## 2024-02-22 LAB
ANION GAP SERPL CALCULATED.3IONS-SCNC: 11.7 MMOL/L (ref 5–15)
BASOPHILS # BLD AUTO: 0.06 10*3/MM3 (ref 0–0.2)
BASOPHILS NFR BLD AUTO: 0.7 % (ref 0–1.5)
BUN SERPL-MCNC: 13 MG/DL (ref 8–23)
BUN/CREAT SERPL: 22.4 (ref 7–25)
CALCIUM SPEC-SCNC: 9.7 MG/DL (ref 8.6–10.5)
CHLORIDE SERPL-SCNC: 88 MMOL/L (ref 98–107)
CO2 SERPL-SCNC: 31.3 MMOL/L (ref 22–29)
CREAT SERPL-MCNC: 0.58 MG/DL (ref 0.57–1)
DEPRECATED RDW RBC AUTO: 48.7 FL (ref 37–54)
EGFRCR SERPLBLD CKD-EPI 2021: 93.9 ML/MIN/1.73
EOSINOPHIL # BLD AUTO: 0.27 10*3/MM3 (ref 0–0.4)
EOSINOPHIL NFR BLD AUTO: 3.3 % (ref 0.3–6.2)
ERYTHROCYTE [DISTWIDTH] IN BLOOD BY AUTOMATED COUNT: 13 % (ref 12.3–15.4)
FLUAV SUBTYP SPEC NAA+PROBE: NOT DETECTED
FLUBV RNA ISLT QL NAA+PROBE: NOT DETECTED
GLUCOSE SERPL-MCNC: 122 MG/DL (ref 65–99)
HCT VFR BLD AUTO: 36.5 % (ref 34–46.6)
HGB BLD-MCNC: 12.2 G/DL (ref 12–15.9)
IMM GRANULOCYTES # BLD AUTO: 0.03 10*3/MM3 (ref 0–0.05)
IMM GRANULOCYTES NFR BLD AUTO: 0.4 % (ref 0–0.5)
LYMPHOCYTES # BLD AUTO: 1.41 10*3/MM3 (ref 0.7–3.1)
LYMPHOCYTES NFR BLD AUTO: 17.1 % (ref 19.6–45.3)
MCH RBC QN AUTO: 34.1 PG (ref 26.6–33)
MCHC RBC AUTO-ENTMCNC: 33.4 G/DL (ref 31.5–35.7)
MCV RBC AUTO: 102 FL (ref 79–97)
MONOCYTES # BLD AUTO: 1.09 10*3/MM3 (ref 0.1–0.9)
MONOCYTES NFR BLD AUTO: 13.2 % (ref 5–12)
NEUTROPHILS NFR BLD AUTO: 5.4 10*3/MM3 (ref 1.7–7)
NEUTROPHILS NFR BLD AUTO: 65.3 % (ref 42.7–76)
PLATELET # BLD AUTO: 250 10*3/MM3 (ref 140–450)
PMV BLD AUTO: 8.4 FL (ref 6–12)
POTASSIUM SERPL-SCNC: 3.5 MMOL/L (ref 3.5–5.2)
RBC # BLD AUTO: 3.58 10*6/MM3 (ref 3.77–5.28)
SARS-COV-2 RNA RESP QL NAA+PROBE: NOT DETECTED
SODIUM SERPL-SCNC: 131 MMOL/L (ref 136–145)
TSH SERPL DL<=0.05 MIU/L-ACNC: 3.17 UIU/ML (ref 0.27–4.2)
WBC NRBC COR # BLD AUTO: 8.26 10*3/MM3 (ref 3.4–10.8)

## 2024-02-22 PROCEDURE — 87636 SARSCOV2 & INF A&B AMP PRB: CPT | Performed by: EMERGENCY MEDICINE

## 2024-02-22 PROCEDURE — 85025 COMPLETE CBC W/AUTO DIFF WBC: CPT | Performed by: EMERGENCY MEDICINE

## 2024-02-22 PROCEDURE — 99283 EMERGENCY DEPT VISIT LOW MDM: CPT | Performed by: EMERGENCY MEDICINE

## 2024-02-22 PROCEDURE — 99285 EMERGENCY DEPT VISIT HI MDM: CPT

## 2024-02-22 PROCEDURE — 80048 BASIC METABOLIC PNL TOTAL CA: CPT | Performed by: EMERGENCY MEDICINE

## 2024-02-22 PROCEDURE — 25510000001 IOPAMIDOL PER 1 ML: Performed by: EMERGENCY MEDICINE

## 2024-02-22 PROCEDURE — 84443 ASSAY THYROID STIM HORMONE: CPT | Performed by: EMERGENCY MEDICINE

## 2024-02-22 PROCEDURE — 70491 CT SOFT TISSUE NECK W/DYE: CPT

## 2024-02-22 RX ORDER — AMOXICILLIN AND CLAVULANATE POTASSIUM 875; 125 MG/1; MG/1
1 TABLET, FILM COATED ORAL 2 TIMES DAILY
Qty: 20 TABLET | Refills: 0 | Status: SHIPPED | OUTPATIENT
Start: 2024-02-22 | End: 2024-03-03

## 2024-02-22 RX ORDER — ACETAMINOPHEN 500 MG
500 TABLET ORAL EVERY 6 HOURS PRN
Qty: 30 TABLET | Refills: 0 | Status: SHIPPED | OUTPATIENT
Start: 2024-02-22

## 2024-02-22 RX ADMIN — IOPAMIDOL 100 ML: 755 INJECTION, SOLUTION INTRAVENOUS at 16:03

## 2024-02-22 NOTE — FSED PROVIDER NOTE
Subjective   History of Present Illness  Patient with complaint of noticing swelling to right side of neck. No painful swallowing or trouble breathing. No pattern to symptoms. No precipitating or relieving factors. No rash. No cough, congestion, fever, shakes, chills. No other complaints. No trauma.     History provided by:  Patient   used: No        Review of Systems   Constitutional: Negative.    HENT:  Negative for facial swelling, sore throat, trouble swallowing and voice change.    All other systems reviewed and are negative.      Past Medical History:   Diagnosis Date    Anxiety     Arthritis     Asthma     Constipation     COPD (chronic obstructive pulmonary disease)     DDD (degenerative disc disease), cervical     DDD (degenerative disc disease), lumbar     Disease of thyroid gland     Double vision 08/17/2023    Emphysema lung     Fibrocystic breast     H/O cervical spine surgery 08/17/2023    Hard of hearing     wears hearing aids bilat    History of cigarette smoking 08/17/2023    Hyperlipidemia     Hypertension     Lymphedema of both lower extremities     red and irritable    Medicare annual wellness visit, subsequent 08/21/2019    Obesity (BMI 30-39.9) 08/17/2023    PONV (postoperative nausea and vomiting)     Seasonal allergies     Sleep apnea     not formally dx- pt O2 drops to mid-70s while asleep    Urinary incontinence        Allergies   Allergen Reactions    Nsaids GI Bleeding    Lisinopril Cough       Past Surgical History:   Procedure Laterality Date    APPENDECTOMY  1958    AUGMENTATION MAMMAPLASTY      BREAST CYST ASPIRATION      BREAST CYST EXCISION      CERVICAL FUSION Right 7/31/2023    Procedure: DAY 1 -CERVICAL CORPECTOMY  LEVEL CERVICAL 5 WITH FUSION LEVELS CERVICAL 4-6 ANTERIOR;  Surgeon: Wilber Mcgowan MD;  Location: [facility] MAIN OR;  Service: Neurosurgery;  Laterality: Right;    CERVICAL FUSION Right 8/2/2023    Procedure: DAY 2: CERVICAL 4-6 LAMINECTOMY &  POSTERIOR LATERAL FUSION WITH INSTRUMENTATION 3-4 LEVELS;  Surgeon: Wilber Mcgowan MD;  Location: [facility] MAIN OR;  Service: Neurosurgery;  Laterality: Right;    HYSTERECTOMY      THYROIDECTOMY, PARTIAL      TONSILLECTOMY         Family History   Problem Relation Age of Onset    Pancreatic cancer Mother     Stroke Father     Stroke Sister     Prostate cancer Brother        Social History     Socioeconomic History    Marital status:    Tobacco Use    Smoking status: Former     Packs/day: 1.50     Years: 40.00     Additional pack years: 0.00     Total pack years: 60.00     Types: Cigarettes     Start date: 1965     Quit date: 2006     Years since quittin.1    Smokeless tobacco: Never   Vaping Use    Vaping Use: Never used   Substance and Sexual Activity    Alcohol use: Yes     Alcohol/week: 11.0 standard drinks of alcohol     Types: 4 Glasses of wine, 7 Shots of liquor per week     Comment: daily, vodka tonic x 8    Drug use: No    Sexual activity: Defer     Birth control/protection: Post-menopausal           Objective   Physical Exam  Vitals and nursing note reviewed.   Neck:      Comments: Nonpulsatile mass along right anterior cervical aspect. Nonmobile.         Procedures           ED Course                                           Medical Decision Making  Concern for thyroid mass, soft tissue cervical neck mass. D/w patient. Agree with plan.    Problems Addressed:  Sialoadenitis of submandibular gland: complicated acute illness or injury    Amount and/or Complexity of Data Reviewed  Labs: ordered.  Radiology: ordered.    Risk  OTC drugs.  Prescription drug management.        Final diagnoses:   Sialoadenitis of submandibular gland       ED Disposition  ED Disposition       ED Disposition   Discharge    Condition   Stable    Comment   --               Lyndsey Jarquin MD   [place] [address]  [phone]    Schedule an appointment as soon as possible for  a visit            Medication List        New Prescriptions      acetaminophen 500 MG tablet  Commonly known as: TYLENOL  Take 1 tablet by mouth Every 6 (Six) Hours As Needed for Moderate Pain or Mild Pain.     amoxicillin-clavulanate 875-125 MG per tablet  Commonly known as: AUGMENTIN  Take 1 tablet by mouth 2 (Two) Times a Day for 10 days.            Stop      HYDROcodone-acetaminophen 5-325 MG per tablet  Commonly known as: NORCO               Where to Get Your Medications        These medications were sent to [id number] - [place], [place] - [address] AT [address] - [phone] [facility] [phone] FX  [address], [place] [address]      Phone: [phone]   acetaminophen 500 MG tablet  amoxicillin-clavulanate 875-125 MG per tablet

## 2024-02-26 RX ORDER — LEVOTHYROXINE SODIUM 0.03 MG/1
TABLET ORAL
Qty: 90 TABLET | Refills: 1 | Status: SHIPPED | OUTPATIENT
Start: 2024-02-26

## 2024-03-29 RX ORDER — DILTIAZEM HYDROCHLORIDE 60 MG/1
TABLET, FILM COATED ORAL
Qty: 30.6 G | Refills: 3 | Status: SHIPPED | OUTPATIENT
Start: 2024-03-29

## 2024-04-25 RX ORDER — METOPROLOL SUCCINATE 100 MG/1
TABLET, EXTENDED RELEASE ORAL
Qty: 90 TABLET | Refills: 3 | OUTPATIENT
Start: 2024-04-25

## 2024-04-25 NOTE — TELEPHONE ENCOUNTER
Our chart states 50mg, not 100mg        metoprolol succinate XL (Toprol XL) 50 MG 24 hr tablet  50 mg, Daily 0 ordered               Dr. Mejia writes this as well    Kayla fernándezt in May

## 2024-05-27 NOTE — PROGRESS NOTES
Subjective:     Encounter Date:05/28/2024      Patient ID: Lori Sutton is a 74 y.o. female.    Chief Complaint and history of present illness:     Follow-up for hypertensive cardiovascular disease with CHF,, dyslipidemia, obesity with BMI over 30, hyperglycemia       History of Present Illness:       Ms. Lori Devine has PMH of     -Hypertension with hypertensive cardiovascular disease/HFpEF  -Dyslipidemia  -Hysterectomy, Appendectomy  -Allergies/intolerance to NSAIDs  -Former smoker     Here for  follow-up.  Patient has shortness of breath and dyspnea on exertion leg edema.  Patient thinks her shortness of breath is due to her asthma.  And her edema is due to lymphedema.     Patient's arterial blood pressure was 127/79, heart rate 80, O2 sat of 95% on room air.   BMI is over 30.     Patient's medication list included atorvastatin, Diovan hydrochlorothiazide 300-25, metoprolol, KCl.  But patient is saying she is taking hydralazine 3 times a day furosemide because her dermatologist recommended it.     Review of records :     [facility] 9/8/2022 were normal.     Labs from 9/7/2022 reveal CMP with a glucose of 104, potassium 3.5, CBC with a hemoglobin of 12.4.  Cholesterol 238, triglycerides 124, HDL 80, LDL 86..  Labs from 4/24/23 revealed normal proBNP of 500, UA without proteinuria, BMP with normal creatinine and elevated glucose.  Labs from 2/22/2024 revealed BMP with a glucose of 122.        Assessment:  :       For edema, leg edema  Hypertensive cardiovascular disease with chronic diastolic heart failure due to preserved EF  Dyslipidemia  Obesity with BMI over 30  Hyperglycemia     Recommendations / Plan:         EKG results with patient.    Stress Cardiolite results with patient which was negative for ischemia EF of 55%.    Echo results which revealed normal LV systolic function.    Follow-up with PMD for hyperglycemia.  We will follow-up and consider further evaluation treatment.  Patient reportedly had  hypotension and flatline during neck surgery and stopped valsartan and hydralazine.  Will continue medical management with aspirin, atorvastatin, furosemide, hydrochlorothiazide, metoprolol as tolerated.   reviewed BMI over 30, counseled on weight loss diet and exercise.       Procedures:  Patient underwent echocardiogram at [facility] 10/20/2023 which revealed EF of 58% with diastolic dysfunction      ECG 12 Lead    Date/Time: 5/28/2024 9:18 PM  Performed by: Digeo Garza MD    Authorized by: Diego Garza MD  Comparison: compared with previous ECG from 8/16/2023  Comparison to previous ECG: EKG done today reviewed/interpreted by me reveals sinus rhythm with a rate of 79 bpm with possible left atrial enlargement, poor R wave progression, no significant change compared EKG from 8/16/2023          Copied text in this portion of the note has been reviewed and is accurate as of 5/30/2024  The following portions of the patient's history were reviewed and updated as appropriate: allergies, current medications, past family history, past medical history, past social history, past surgical history and problem list.    Assessment:         [facility]       Diagnosis Plan   1. Hypertensive heart disease with chronic diastolic congestive heart failure  ECG 12 Lead      2. Dyslipidemia  ECG 12 Lead      3. Obesity (BMI 30-39.9)  ECG 12 Lead      4. Hyperglycemia  ECG 12 Lead             Plan:               Past Medical History:  Past Medical History:   Diagnosis Date    Anxiety     Arthritis     Asthma     Constipation     COPD (chronic obstructive pulmonary disease)     DDD (degenerative disc disease), cervical     DDD (degenerative disc disease), lumbar     Disease of thyroid gland     Double vision 08/17/2023    Emphysema lung     Fibrocystic breast     H/O cervical spine surgery 08/17/2023    Hard of hearing     wears hearing aids bilat    History of cigarette smoking 08/17/2023    Hyperlipidemia      Hypertension     Lymphedema of both lower extremities     red and irritable    Medicare annual wellness visit, subsequent 08/21/2019    Obesity (BMI 30-39.9) 08/17/2023    PONV (postoperative nausea and vomiting)     Seasonal allergies     Sleep apnea     not formally dx- pt O2 drops to mid-70s while asleep    Urinary incontinence      Past Surgical History:  Past Surgical History:   Procedure Laterality Date    APPENDECTOMY  1958    AUGMENTATION MAMMAPLASTY      BREAST CYST ASPIRATION      BREAST CYST EXCISION      CERVICAL FUSION Right 7/31/2023    Procedure: DAY 1 -CERVICAL CORPECTOMY  LEVEL CERVICAL 5 WITH FUSION LEVELS CERVICAL 4-6 ANTERIOR;  Surgeon: Wilber Mcgowan MD;  Location: [facility] MAIN OR;  Service: Neurosurgery;  Laterality: Right;    CERVICAL FUSION Right 8/2/2023    Procedure: DAY 2: CERVICAL 4-6 LAMINECTOMY & POSTERIOR LATERAL FUSION WITH INSTRUMENTATION 3-4 LEVELS;  Surgeon: Wilber cMgowan MD;  Location: [facility] MAIN OR;  Service: Neurosurgery;  Laterality: Right;    HYSTERECTOMY      THYROIDECTOMY, PARTIAL      TONSILLECTOMY  1958      Allergies:  Allergies   Allergen Reactions    Nsaids GI Bleeding    Lisinopril Cough     Home Meds:  Current Meds:     Current Outpatient Medications:     allopurinol (ZYLOPRIM) 300 MG tablet, TAKE 1 TABLET BY MOUTH  DAILY, Disp: 90 tablet, Rfl: 3    aspirin 81 MG EC tablet, Take 1 tablet by mouth Daily., Disp: 30 tablet, Rfl: 0    atorvastatin (Lipitor) 40 MG tablet, Take 1 tablet by mouth Every Night., Disp: 30 tablet, Rfl: 0    Cholecalciferol 1000 units tablet, Take 1 tablet by mouth Daily., Disp: , Rfl:     citalopram (CeleXA) 20 MG tablet, TAKE 1 TABLET BY MOUTH  DAILY, Disp: 90 tablet, Rfl: 3    furosemide (LASIX) 40 MG tablet, Take 1 tablet by mouth As Needed., Disp: , Rfl:     hydroCHLOROthiazide (HYDRODIURIL) 25 MG tablet, Take 1 tablet by mouth Daily., Disp: , Rfl:     levothyroxine (SYNTHROID, LEVOTHROID) 25 MCG tablet, TAKE 1 TABLET BY MOUTH DAILY,  Disp: 90 tablet, Rfl: 1    metoprolol succinate XL (Toprol XL) 50 MG 24 hr tablet, Take 1 tablet by mouth Daily. (Patient taking differently: Take 2 tablets by mouth Daily.), Disp: 30 tablet, Rfl: 0    montelukast (SINGULAIR) 10 MG tablet, TAKE 1 TABLET BY MOUTH  DAILY, Disp: 90 tablet, Rfl: 3    Restasis 0.05 % ophthalmic emulsion, Administer 1 drop to both eyes Daily As Needed., Disp: , Rfl:     Symbicort 80-4.5 MCG/ACT inhaler, USE 2 INHALATIONS BY MOUTH TWICE DAILY, Disp: 30.6 g, Rfl: 3    acetaminophen (TYLENOL) 500 MG tablet, Take 1 tablet by mouth Every 6 (Six) Hours As Needed for Moderate Pain or Mild Pain., Disp: 30 tablet, Rfl: 0    ALBUTEROL SULFATE  (90 Base) MCG/ACT inhaler, INHALE 2 PUFFS BY MOUTH EVERY 4 HOURS AS NEEDED FOR WHEEZING, Disp: 42.5 g, Rfl: 2    baclofen (LIORESAL) 10 MG tablet, Take 1 tablet by mouth 3 (Three) Times a Day., Disp: , Rfl:     hydrALAZINE (APRESOLINE) 50 MG tablet, TAKE 1 TABLET BY MOUTH 3  TIMES DAILY, Disp: 270 tablet, Rfl: 3    triamcinolone (KENALOG) 0.5 % ointment, APPLY TOPICALLY TO THE  APPROPRIATE AREA AS  DIRECTED TWICE DAILY, Disp: 90 g, Rfl: 1  Social History:   Social History     Tobacco Use    Smoking status: Former     Current packs/day: 0.00     Average packs/day: 1.5 packs/day for 41.0 years (61.5 ttl pk-yrs)     Types: Cigarettes     Start date: 1965     Quit date: 2006     Years since quittin.4    Smokeless tobacco: Never   Substance Use Topics    Alcohol use: Yes     Alcohol/week: 11.0 standard drinks of alcohol     Types: 4 Glasses of wine, 7 Shots of liquor per week     Comment: daily, vodka tonic x 8      Family History:  Family History   Problem Relation Age of Onset    Pancreatic cancer Mother     Stroke Father     Stroke Sister     Prostate cancer Brother               Review of Systems   Constitutional: Negative for malaise/fatigue.   Cardiovascular:  Positive for leg swelling. Negative for chest pain and palpitations.  "  Respiratory:  Negative for shortness of breath.    Skin:  Negative for rash.   Neurological:  Positive for numbness (hands and feet). Negative for dizziness and light-headedness.     All other systems are negative         Objective:     Physical Exam  /79   Pulse 80   Ht 157.5 cm (62\")   Wt 77.6 kg (171 lb)   SpO2 95%   BMI 31.28 kg/m²   General:  Appears in no acute distress  Eyes: Sclera is anicteric,  conjunctiva is clear   HEENT:  No JVD.  No carotid bruits  Respiratory: Respirations regular and unlabored at rest.  Clear to auscultation  Cardiovascular: S1,S2 Regular rate and rhythm. .   Extremities: No digital clubbing or cyanosis, no edema  Skin: Color pink. Skin warm and dry to touch. No rashes  No xanthoma  Neuro: Alert and awake.    Lab Reviewed:         Diego Garza MD  5/30/2024 21:23 EDT      EMR Dragon/Transcription:   \"Dictated utilizing Dragon dictation\".        "

## 2024-05-28 ENCOUNTER — OFFICE VISIT (OUTPATIENT)
Dept: CARDIOLOGY | Facility: CLINIC | Age: 74
End: 2024-05-28
Payer: MEDICARE

## 2024-05-28 VITALS
HEART RATE: 80 BPM | SYSTOLIC BLOOD PRESSURE: 127 MMHG | WEIGHT: 171 LBS | OXYGEN SATURATION: 95 % | BODY MASS INDEX: 31.47 KG/M2 | DIASTOLIC BLOOD PRESSURE: 79 MMHG | HEIGHT: 62 IN

## 2024-05-28 DIAGNOSIS — E78.5 DYSLIPIDEMIA: ICD-10-CM

## 2024-05-28 DIAGNOSIS — I50.32 HYPERTENSIVE HEART DISEASE WITH CHRONIC DIASTOLIC CONGESTIVE HEART FAILURE: Primary | ICD-10-CM

## 2024-05-28 DIAGNOSIS — I11.0 HYPERTENSIVE HEART DISEASE WITH CHRONIC DIASTOLIC CONGESTIVE HEART FAILURE: Primary | ICD-10-CM

## 2024-05-28 DIAGNOSIS — R73.9 HYPERGLYCEMIA: ICD-10-CM

## 2024-05-28 DIAGNOSIS — E66.9 OBESITY (BMI 30-39.9): ICD-10-CM

## 2024-05-28 PROCEDURE — 99214 OFFICE O/P EST MOD 30 MIN: CPT | Performed by: INTERNAL MEDICINE

## 2024-05-28 PROCEDURE — 3078F DIAST BP <80 MM HG: CPT | Performed by: INTERNAL MEDICINE

## 2024-05-28 PROCEDURE — 1159F MED LIST DOCD IN RCRD: CPT | Performed by: INTERNAL MEDICINE

## 2024-05-28 PROCEDURE — 93000 ELECTROCARDIOGRAM COMPLETE: CPT | Performed by: INTERNAL MEDICINE

## 2024-05-28 PROCEDURE — 3074F SYST BP LT 130 MM HG: CPT | Performed by: INTERNAL MEDICINE

## 2024-05-28 PROCEDURE — 1160F RVW MEDS BY RX/DR IN RCRD: CPT | Performed by: INTERNAL MEDICINE

## 2024-05-28 RX ORDER — FUROSEMIDE 40 MG/1
40 TABLET ORAL AS NEEDED
COMMUNITY

## 2024-06-12 RX ORDER — CITALOPRAM 20 MG/1
TABLET ORAL
Qty: 90 TABLET | Refills: 3 | OUTPATIENT
Start: 2024-06-12

## 2024-06-20 RX ORDER — ALLOPURINOL 300 MG/1
TABLET ORAL
Qty: 90 TABLET | Refills: 3 | OUTPATIENT
Start: 2024-06-20

## 2024-06-20 RX ORDER — ATORVASTATIN CALCIUM 20 MG/1
TABLET, FILM COATED ORAL
Qty: 90 TABLET | Refills: 3 | OUTPATIENT
Start: 2024-06-20

## 2024-06-20 RX ORDER — MONTELUKAST SODIUM 10 MG/1
TABLET ORAL
Qty: 90 TABLET | Refills: 3 | OUTPATIENT
Start: 2024-06-20

## 2024-07-16 RX ORDER — LEVOTHYROXINE SODIUM 0.03 MG/1
TABLET ORAL
Qty: 90 TABLET | Refills: 1 | Status: SHIPPED | OUTPATIENT
Start: 2024-07-16

## 2024-07-30 ENCOUNTER — TELEPHONE (OUTPATIENT)
Dept: NEUROSURGERY | Facility: CLINIC | Age: 74
End: 2024-07-30
Payer: MEDICARE

## 2024-07-30 NOTE — TELEPHONE ENCOUNTER
"LVM for patient to call the office. \"Needs to bring Disc from X-Rays Done at [facility] on 2/28/24 to her appointment on 8/13/24.\" \"Hub OK to Relay message\"  "

## 2024-07-30 NOTE — PROGRESS NOTES
"Subjective   History of Present Illness: Lori Sutton is a 74 y.o. female is here today for 6-month postop follow-up.  Patient underwent a C4-C5 ACDF with C5 corpectomy as well as a posterior lateral fusion from C4-C6 approximately a year ago.  On her last 6 postoperative follow-up in February with Dr. Mcgowan she continued to have improvement in her myelopathic symptoms.  She was continuing with her bone growth stimulator.  Patient doing very well with no reported neck pain.  She continues to ambulate better with improved balance.  She still does report some numbness in her hands but does have history of some peripheral neuropathy.    History of Present Illness    The following portions of the patient's history were reviewed and updated as appropriate: allergies, current medications, past family history, past medical history, past social history, past surgical history, and problem list.    Review of Systems   Constitutional: Negative.    HENT: Negative.     Eyes: Negative.    Respiratory: Negative.     Cardiovascular: Negative.    Gastrointestinal: Negative.    Endocrine: Negative.    Genitourinary: Negative.    Musculoskeletal: Negative.    Skin: Negative.    Allergic/Immunologic: Negative.    Neurological:  Positive for numbness (bilateral hands).   Hematological: Negative.    Psychiatric/Behavioral: Negative.     All other systems reviewed and are negative.      Objective     ./73 (BP Location: Left arm, Patient Position: Sitting)   Pulse 83   Ht 157.5 cm (62\")   Wt 81.1 kg (178 lb 14.4 oz)   SpO2 91%   BMI 32.72 kg/m²    Body mass index is 32.72 kg/m².    Vitals:    08/13/24 1359   PainSc: 0-No pain          Physical Exam  Neurologic Exam      Assessment & Plan   Independent Review of Radiographic Studies:      I personally reviewed the images from the following studies.    Cervical x-rays with flexion-extension 8/6/2024    Postoperative changes noted with anterior and posterior hardware.  No gross " hardware failure noted.  No instability noted on flexion-extension imaging.    Medical Decision Making:      Lori Echeverria a 74 y.o. female being seen today for her 1 postop follow-up.  Patient underwent an anterior and posterior fusion with Dr. Mcgowan in February of this year.  Patient doing very well with no neck pain or arm pain.  She is continue to have improvement from her preoperative myelopathic symptoms include improve walking and balance.  Her imaging demonstrating expected hardware positioning with no instability noted on flexion-extension imaging.  No further postoperative follow-up needed at this time.  I encouraged her to call the office with questions or concerns.  Patient agrees to plan of care and wishes to proceed.        Diagnoses and all orders for this visit:    1. S/P cervical spinal fusion (Primary)      Return if symptoms worsen or fail to improve.    This patient was examined wearing appropriate personal protective equipment.     Lori Sutton  reports that she quit smoking about 18 years ago. Her smoking use included cigarettes. She started smoking about 59 years ago. She has a 61.5 pack-year smoking history. She has never used smokeless tobacco.      Body mass index is 32.72 kg/m².    BMI is >= 30 and <35. (Class 1 Obesity). Recommendations for exercise counseling/recommendations and nutrition counseling/recommendations   Patient's blood pressure was reviewed.  Recommendations for  a low-salt diet and exercise to maintain/improve BP in addition to taking any presribed medications.    Advance Care Planning   ACP discussion was held with the patient during this visit. Patient has an advance directive in EMR which is still valid.          BEATRICE Nelson  08/13/24  14:21 EDT

## 2024-08-02 NOTE — TELEPHONE ENCOUNTER
"LVM for patient to call the office. \" We need to verify that she is bringing her disc from the X-Rays to  the Appointment on 8/13/24.\"  "

## 2024-08-06 ENCOUNTER — HOSPITAL ENCOUNTER (OUTPATIENT)
Dept: GENERAL RADIOLOGY | Facility: HOSPITAL | Age: 74
Discharge: HOME | End: 2024-08-06
Admitting: NEUROLOGICAL SURGERY
Payer: MEDICARE

## 2024-08-06 DIAGNOSIS — M47.12 CERVICAL SPONDYLOSIS WITH MYELOPATHY: ICD-10-CM

## 2024-08-06 DIAGNOSIS — Z98.1 S/P CERVICAL SPINAL FUSION: ICD-10-CM

## 2024-08-06 PROCEDURE — 72050 X-RAY EXAM NECK SPINE 4/5VWS: CPT

## 2024-08-13 ENCOUNTER — OFFICE VISIT (OUTPATIENT)
Dept: NEUROSURGERY | Facility: CLINIC | Age: 74
End: 2024-08-13
Payer: MEDICARE

## 2024-08-13 VITALS
DIASTOLIC BLOOD PRESSURE: 73 MMHG | BODY MASS INDEX: 32.92 KG/M2 | OXYGEN SATURATION: 91 % | WEIGHT: 178.9 LBS | HEIGHT: 62 IN | SYSTOLIC BLOOD PRESSURE: 125 MMHG | HEART RATE: 83 BPM

## 2024-08-13 DIAGNOSIS — Z98.1 S/P CERVICAL SPINAL FUSION: Primary | ICD-10-CM

## 2024-08-13 RX ORDER — OMEPRAZOLE 40 MG/1
40 CAPSULE, DELAYED RELEASE ORAL
COMMUNITY
Start: 2024-06-18

## 2024-08-13 RX ORDER — LIDOCAINE 50 MG/G
1-3 PATCH TOPICAL EVERY 24 HOURS
COMMUNITY
Start: 2024-06-18 | End: 2025-06-18

## 2024-08-13 RX ORDER — POTASSIUM CHLORIDE 750 MG/1
20 TABLET, FILM COATED, EXTENDED RELEASE ORAL DAILY
COMMUNITY

## 2024-08-30 ENCOUNTER — HOSPITAL ENCOUNTER (OUTPATIENT)
Facility: HOSPITAL | Age: 74
Discharge: HOME | End: 2024-08-30
Attending: EMERGENCY MEDICINE
Payer: MEDICARE

## 2024-08-30 VITALS
TEMPERATURE: 98.7 F | WEIGHT: 173.6 LBS | DIASTOLIC BLOOD PRESSURE: 78 MMHG | SYSTOLIC BLOOD PRESSURE: 155 MMHG | RESPIRATION RATE: 16 BRPM | OXYGEN SATURATION: 95 % | BODY MASS INDEX: 31.94 KG/M2 | HEIGHT: 62 IN | HEART RATE: 95 BPM

## 2024-08-30 DIAGNOSIS — Z20.822 EXPOSURE TO COVID-19 VIRUS: Primary | ICD-10-CM

## 2024-08-30 LAB
FLUAV SUBTYP SPEC NAA+PROBE: NOT DETECTED
FLUBV RNA ISLT QL NAA+PROBE: NOT DETECTED
SARS-COV-2 RNA RESP QL NAA+PROBE: NOT DETECTED
STREP A PCR: NOT DETECTED

## 2024-08-30 PROCEDURE — G0463 HOSPITAL OUTPT CLINIC VISIT: HCPCS

## 2024-08-30 PROCEDURE — 99213 OFFICE O/P EST LOW 20 MIN: CPT

## 2024-08-30 PROCEDURE — 87651 STREP A DNA AMP PROBE: CPT

## 2024-08-30 PROCEDURE — 87636 SARSCOV2 & INF A&B AMP PRB: CPT

## 2024-08-30 NOTE — FSED PROVIDER NOTE
Subjective   History of Present Illness  74-year-old female reports that she was exposed to a friend who had COVID last week and she reports a friend had runny nose and sore throat.  She is reporting today that she woke up with runny nose and sore throat and is concerned she to could have COVID.  She is requesting COVID testing.  She denies chest pain shortness of breath vomiting and diarrhea.        Review of Systems   All other systems reviewed and are negative.      Past Medical History:   Diagnosis Date    Anxiety     Arthritis     Asthma     Constipation     COPD (chronic obstructive pulmonary disease)     DDD (degenerative disc disease), cervical     DDD (degenerative disc disease), lumbar     Disease of thyroid gland     Double vision 08/17/2023    Emphysema lung     Fibrocystic breast     H/O cervical spine surgery 08/17/2023    Hard of hearing     wears hearing aids bilat    History of cigarette smoking 08/17/2023    Hyperlipidemia     Hypertension     Lymphedema of both lower extremities     red and irritable    Medicare annual wellness visit, subsequent 08/21/2019    Obesity (BMI 30-39.9) 08/17/2023    PONV (postoperative nausea and vomiting)     Seasonal allergies     Sleep apnea     not formally dx- pt O2 drops to mid-70s while asleep    Urinary incontinence        Allergies   Allergen Reactions    Nsaids GI Bleeding    Lisinopril Cough       Past Surgical History:   Procedure Laterality Date    APPENDECTOMY  1958    AUGMENTATION MAMMAPLASTY      BREAST CYST ASPIRATION      BREAST CYST EXCISION      CERVICAL FUSION Right 7/31/2023    Procedure: DAY 1 -CERVICAL CORPECTOMY  LEVEL CERVICAL 5 WITH FUSION LEVELS CERVICAL 4-6 ANTERIOR;  Surgeon: Wilber Mcgowan MD;  Location: [facility];  Service: Neurosurgery;  Laterality: Right;    CERVICAL FUSION Right 8/2/2023    Procedure: DAY 2: CERVICAL 4-6 LAMINECTOMY & POSTERIOR LATERAL FUSION WITH INSTRUMENTATION 3-4 LEVELS;  Surgeon: Wilber Mcgowan MD;   Location: [facility] MAIN OR;  Service: Neurosurgery;  Laterality: Right;    HYSTERECTOMY      THYROIDECTOMY, PARTIAL      TONSILLECTOMY         Family History   Problem Relation Age of Onset    Pancreatic cancer Mother     Stroke Father     Stroke Sister     Prostate cancer Brother        Social History     Socioeconomic History    Marital status:    Tobacco Use    Smoking status: Former     Current packs/day: 0.00     Average packs/day: 1.5 packs/day for 41.0 years (61.5 ttl pk-yrs)     Types: Cigarettes     Start date: 1965     Quit date: 2006     Years since quittin.6    Smokeless tobacco: Never   Vaping Use    Vaping status: Never Used   Substance and Sexual Activity    Alcohol use: Yes     Alcohol/week: 11.0 standard drinks of alcohol     Types: 4 Glasses of wine, 7 Shots of liquor per week     Comment: daily, vodka tonic x 8    Drug use: No    Sexual activity: Defer     Birth control/protection: Post-menopausal           Objective   Physical Exam  Vitals and nursing note reviewed.   Constitutional:       Appearance: Normal appearance.   HENT:      Head: Normocephalic and atraumatic.      Nose: Nose normal.      Mouth/Throat:      Mouth: Mucous membranes are moist.      Pharynx: Oropharynx is clear.      Comments: Postnasal drainage  Eyes:      Extraocular Movements: Extraocular movements intact.      Pupils: Pupils are equal, round, and reactive to light.   Cardiovascular:      Rate and Rhythm: Normal rate.      Pulses: Normal pulses.   Pulmonary:      Effort: Pulmonary effort is normal. No respiratory distress.      Breath sounds: Normal breath sounds. No wheezing, rhonchi or rales.   Abdominal:      General: Abdomen is flat. Bowel sounds are normal.      Palpations: Abdomen is soft.   Musculoskeletal:         General: Normal range of motion.      Cervical back: Normal range of motion.   Skin:     General: Skin is warm.      Capillary Refill: Capillary refill takes less than 2 seconds.    Neurological:      General: No focal deficit present.      Mental Status: She is alert and oriented to person, place, and time. Mental status is at baseline.         Procedures           ED Course  ED Course as of 08/30/24 1807   Fri Aug 30, 2024   1611 COVID influenza and strep are negative [WF]      ED Course User Index  [WF] Abraham Murphy Jr., APRN                                           Medical Decision Making  COVID is negative discussed symptom management of postnasal drainage recommending patient continue Allegra she is agreeable to discharge at this time    Problems Addressed:  Exposure to COVID-19 virus: acute illness or injury        Final diagnoses:   Exposure to COVID-19 virus       ED Disposition  ED Disposition       ED Disposition   Discharge    Condition   Stable    Comment   --               Lyndsey Jarquin MD  [address] [address]  [phone]               Medication List        Changed      metoprolol succinate XL 50 MG 24 hr tablet  Commonly known as: Toprol XL  Take 1 tablet by mouth Daily.  What changed: how much to take

## 2024-08-30 NOTE — DISCHARGE INSTRUCTIONS
Take Tylenol as needed for body aches and fever    Continue with Allegra allergy medication to help with symptoms of runny nose and postnasal drainage    Increase fluid intake with water, Pedialyte    Follow-up with your family doctor as needed return to ER for worsening symptoms

## 2024-09-06 ENCOUNTER — APPOINTMENT (OUTPATIENT)
Dept: GENERAL RADIOLOGY | Facility: HOSPITAL | Age: 74
End: 2024-09-06
Payer: MEDICARE

## 2024-09-06 ENCOUNTER — HOSPITAL ENCOUNTER (EMERGENCY)
Facility: HOSPITAL | Age: 74
Discharge: LEFT BEFORE DISCHARGE | End: 2024-09-06
Attending: EMERGENCY MEDICINE
Payer: MEDICARE

## 2024-09-06 VITALS
HEIGHT: 62 IN | TEMPERATURE: 98.4 F | RESPIRATION RATE: 22 BRPM | OXYGEN SATURATION: 91 % | HEART RATE: 93 BPM | WEIGHT: 171.9 LBS | SYSTOLIC BLOOD PRESSURE: 120 MMHG | BODY MASS INDEX: 31.63 KG/M2 | DIASTOLIC BLOOD PRESSURE: 59 MMHG

## 2024-09-06 DIAGNOSIS — E87.6 HYPOKALEMIA: ICD-10-CM

## 2024-09-06 DIAGNOSIS — J44.1 COPD EXACERBATION: Primary | ICD-10-CM

## 2024-09-06 DIAGNOSIS — J96.01 ACUTE RESPIRATORY FAILURE WITH HYPOXIA: ICD-10-CM

## 2024-09-06 DIAGNOSIS — E83.42 HYPOMAGNESEMIA: ICD-10-CM

## 2024-09-06 LAB
ALBUMIN SERPL-MCNC: 3.9 G/DL (ref 3.5–5.2)
ALBUMIN/GLOB SERPL: 1.5 G/DL
ALP SERPL-CCNC: 79 U/L (ref 39–117)
ALT SERPL W P-5'-P-CCNC: 16 U/L (ref 1–33)
ANION GAP SERPL CALCULATED.3IONS-SCNC: 9.3 MMOL/L (ref 5–15)
AST SERPL-CCNC: 25 U/L (ref 1–32)
BASOPHILS # BLD AUTO: 0.02 10*3/MM3 (ref 0–0.2)
BASOPHILS NFR BLD AUTO: 0.2 % (ref 0–1.5)
BILIRUB SERPL-MCNC: 0.5 MG/DL (ref 0–1.2)
BUN SERPL-MCNC: 7 MG/DL (ref 8–23)
BUN/CREAT SERPL: 12.5 (ref 7–25)
CALCIUM SPEC-SCNC: 9.7 MG/DL (ref 8.6–10.5)
CHLORIDE SERPL-SCNC: 84 MMOL/L (ref 98–107)
CO2 SERPL-SCNC: 36.7 MMOL/L (ref 22–29)
CREAT SERPL-MCNC: 0.56 MG/DL (ref 0.57–1)
DEPRECATED RDW RBC AUTO: 45.3 FL (ref 37–54)
EGFRCR SERPLBLD CKD-EPI 2021: 94.7 ML/MIN/1.73
EOSINOPHIL # BLD AUTO: 0.13 10*3/MM3 (ref 0–0.4)
EOSINOPHIL NFR BLD AUTO: 1.2 % (ref 0.3–6.2)
ERYTHROCYTE [DISTWIDTH] IN BLOOD BY AUTOMATED COUNT: 12.1 % (ref 12.3–15.4)
FLUAV SUBTYP SPEC NAA+PROBE: NOT DETECTED
FLUBV RNA ISLT QL NAA+PROBE: NOT DETECTED
GLOBULIN UR ELPH-MCNC: 2.6 GM/DL
GLUCOSE SERPL-MCNC: 145 MG/DL (ref 65–99)
HCT VFR BLD AUTO: 34.3 % (ref 34–46.6)
HGB BLD-MCNC: 11.7 G/DL (ref 12–15.9)
IMM GRANULOCYTES # BLD AUTO: 0.07 10*3/MM3 (ref 0–0.05)
IMM GRANULOCYTES NFR BLD AUTO: 0.6 % (ref 0–0.5)
LIPASE SERPL-CCNC: 15 U/L (ref 13–60)
LYMPHOCYTES # BLD AUTO: 1.15 10*3/MM3 (ref 0.7–3.1)
LYMPHOCYTES NFR BLD AUTO: 10.3 % (ref 19.6–45.3)
MAGNESIUM SERPL-MCNC: 1 MG/DL (ref 1.6–2.4)
MCH RBC QN AUTO: 34.4 PG (ref 26.6–33)
MCHC RBC AUTO-ENTMCNC: 34.1 G/DL (ref 31.5–35.7)
MCV RBC AUTO: 100.9 FL (ref 79–97)
MONOCYTES # BLD AUTO: 1.58 10*3/MM3 (ref 0.1–0.9)
MONOCYTES NFR BLD AUTO: 14.2 % (ref 5–12)
NEUTROPHILS NFR BLD AUTO: 73.5 % (ref 42.7–76)
NEUTROPHILS NFR BLD AUTO: 8.17 10*3/MM3 (ref 1.7–7)
PLATELET # BLD AUTO: 198 10*3/MM3 (ref 140–450)
PMV BLD AUTO: 8.5 FL (ref 6–12)
POTASSIUM SERPL-SCNC: 2.9 MMOL/L (ref 3.5–5.2)
PROT SERPL-MCNC: 6.5 G/DL (ref 6–8.5)
QT INTERVAL: 408 MS
QTC INTERVAL: 496 MS
RBC # BLD AUTO: 3.4 10*6/MM3 (ref 3.77–5.28)
SARS-COV-2 RNA RESP QL NAA+PROBE: NOT DETECTED
SODIUM SERPL-SCNC: 130 MMOL/L (ref 136–145)
STREP A PCR: NOT DETECTED
WBC NRBC COR # BLD AUTO: 11.12 10*3/MM3 (ref 3.4–10.8)

## 2024-09-06 PROCEDURE — 85025 COMPLETE CBC W/AUTO DIFF WBC: CPT | Performed by: EMERGENCY MEDICINE

## 2024-09-06 PROCEDURE — 87636 SARSCOV2 & INF A&B AMP PRB: CPT | Performed by: EMERGENCY MEDICINE

## 2024-09-06 PROCEDURE — 99284 EMERGENCY DEPT VISIT MOD MDM: CPT

## 2024-09-06 PROCEDURE — 96365 THER/PROPH/DIAG IV INF INIT: CPT

## 2024-09-06 PROCEDURE — 80053 COMPREHEN METABOLIC PANEL: CPT | Performed by: EMERGENCY MEDICINE

## 2024-09-06 PROCEDURE — 87651 STREP A DNA AMP PROBE: CPT | Performed by: EMERGENCY MEDICINE

## 2024-09-06 PROCEDURE — 25010000002 MAGNESIUM SULFATE IN D5W 1G/100ML (PREMIX) 1-5 GM/100ML-% SOLUTION: Performed by: EMERGENCY MEDICINE

## 2024-09-06 PROCEDURE — 83690 ASSAY OF LIPASE: CPT | Performed by: EMERGENCY MEDICINE

## 2024-09-06 PROCEDURE — 25810000003 LACTATED RINGERS SOLUTION: Performed by: EMERGENCY MEDICINE

## 2024-09-06 PROCEDURE — 71045 X-RAY EXAM CHEST 1 VIEW: CPT

## 2024-09-06 PROCEDURE — 93005 ELECTROCARDIOGRAM TRACING: CPT | Performed by: EMERGENCY MEDICINE

## 2024-09-06 PROCEDURE — 83735 ASSAY OF MAGNESIUM: CPT | Performed by: EMERGENCY MEDICINE

## 2024-09-06 PROCEDURE — 93010 ELECTROCARDIOGRAM REPORT: CPT | Performed by: EMERGENCY MEDICINE

## 2024-09-06 PROCEDURE — 99284 EMERGENCY DEPT VISIT MOD MDM: CPT | Performed by: EMERGENCY MEDICINE

## 2024-09-06 PROCEDURE — 96366 THER/PROPH/DIAG IV INF ADDON: CPT

## 2024-09-06 PROCEDURE — 25010000002 METHYLPREDNISOLONE PER 125 MG: Performed by: EMERGENCY MEDICINE

## 2024-09-06 PROCEDURE — 96375 TX/PRO/DX INJ NEW DRUG ADDON: CPT

## 2024-09-06 RX ORDER — IPRATROPIUM BROMIDE AND ALBUTEROL SULFATE 2.5; .5 MG/3ML; MG/3ML
3 SOLUTION RESPIRATORY (INHALATION) ONCE
Status: COMPLETED | OUTPATIENT
Start: 2024-09-06 | End: 2024-09-06

## 2024-09-06 RX ORDER — MAGNESIUM SULFATE 1 G/100ML
1 INJECTION INTRAVENOUS ONCE
Status: COMPLETED | OUTPATIENT
Start: 2024-09-06 | End: 2024-09-06

## 2024-09-06 RX ORDER — POTASSIUM CHLORIDE 1500 MG/1
20 TABLET, EXTENDED RELEASE ORAL 2 TIMES DAILY
Qty: 10 TABLET | Refills: 0 | Status: SHIPPED | OUTPATIENT
Start: 2024-09-06 | End: 2024-09-11

## 2024-09-06 RX ORDER — IPRATROPIUM BROMIDE AND ALBUTEROL SULFATE 2.5; .5 MG/3ML; MG/3ML
3 SOLUTION RESPIRATORY (INHALATION) ONCE
Status: DISCONTINUED | OUTPATIENT
Start: 2024-09-06 | End: 2024-09-06

## 2024-09-06 RX ORDER — METHYLPREDNISOLONE SODIUM SUCCINATE 125 MG/2ML
125 INJECTION, POWDER, LYOPHILIZED, FOR SOLUTION INTRAMUSCULAR; INTRAVENOUS ONCE
Status: COMPLETED | OUTPATIENT
Start: 2024-09-06 | End: 2024-09-06

## 2024-09-06 RX ORDER — ALBUTEROL SULFATE 0.83 MG/ML
2.5 SOLUTION RESPIRATORY (INHALATION) ONCE
Status: DISCONTINUED | OUTPATIENT
Start: 2024-09-06 | End: 2024-09-06 | Stop reason: SDUPTHER

## 2024-09-06 RX ORDER — ALBUTEROL SULFATE 0.83 MG/ML
2.5 SOLUTION RESPIRATORY (INHALATION) ONCE
Status: COMPLETED | OUTPATIENT
Start: 2024-09-06 | End: 2024-09-06

## 2024-09-06 RX ORDER — PREDNISONE 20 MG/1
60 TABLET ORAL DAILY
Qty: 15 TABLET | Refills: 0 | Status: SHIPPED | OUTPATIENT
Start: 2024-09-06 | End: 2024-09-11

## 2024-09-06 RX ORDER — SODIUM CHLORIDE 0.9 % (FLUSH) 0.9 %
10 SYRINGE (ML) INJECTION AS NEEDED
Status: DISCONTINUED | OUTPATIENT
Start: 2024-09-06 | End: 2024-09-06 | Stop reason: HOSPADM

## 2024-09-06 RX ORDER — POTASSIUM CHLORIDE 1500 MG/1
40 TABLET, EXTENDED RELEASE ORAL ONCE
Status: COMPLETED | OUTPATIENT
Start: 2024-09-06 | End: 2024-09-06

## 2024-09-06 RX ORDER — DOXYCYCLINE 100 MG/1
100 CAPSULE ORAL 2 TIMES DAILY
Qty: 14 CAPSULE | Refills: 0 | Status: SHIPPED | OUTPATIENT
Start: 2024-09-06 | End: 2024-09-13

## 2024-09-06 RX ADMIN — ALBUTEROL SULFATE 2.5 MG: 2.5 SOLUTION RESPIRATORY (INHALATION) at 10:44

## 2024-09-06 RX ADMIN — POTASSIUM CHLORIDE 40 MEQ: 1500 TABLET, EXTENDED RELEASE ORAL at 10:12

## 2024-09-06 RX ADMIN — MAGNESIUM SULFATE IN DEXTROSE 1 G: 10 INJECTION, SOLUTION INTRAVENOUS at 10:38

## 2024-09-06 RX ADMIN — SODIUM CHLORIDE, POTASSIUM CHLORIDE, SODIUM LACTATE AND CALCIUM CHLORIDE 500 ML: 600; 310; 30; 20 INJECTION, SOLUTION INTRAVENOUS at 10:14

## 2024-09-06 RX ADMIN — METHYLPREDNISOLONE SODIUM SUCCINATE 125 MG: 125 INJECTION, POWDER, FOR SOLUTION INTRAMUSCULAR; INTRAVENOUS at 09:24

## 2024-09-06 RX ADMIN — ALBUTEROL SULFATE 2.5 MG: 2.5 SOLUTION RESPIRATORY (INHALATION) at 09:37

## 2024-09-06 RX ADMIN — IPRATROPIUM BROMIDE AND ALBUTEROL SULFATE 3 ML: .5; 3 SOLUTION RESPIRATORY (INHALATION) at 09:37

## 2024-09-06 RX ADMIN — IPRATROPIUM BROMIDE AND ALBUTEROL SULFATE 3 ML: .5; 3 SOLUTION RESPIRATORY (INHALATION) at 10:44

## 2024-09-06 NOTE — ED NOTES
Oxygen removed per MD to see if oxygen saturations drop. Will continue to monitor. Patient still wheezing but reports breathing is better.

## 2024-09-06 NOTE — ED NOTES
Have trialed pt off nasal cannula for 45 minutes; informed pt she does still drop in the 80's at times. Pt still wants to go home. AMA for will be signed.

## 2024-09-06 NOTE — ED NOTES
Oxygen saturations dropping into the mid 80s on room air--resting in her chair and reports she is not having any increased shortness of breath. Placed patient back on 2L. MD notified.

## 2024-09-06 NOTE — FSED PROVIDER NOTE
Subjective   History of Present Illness  74-year-old female with a history of COPD, sleep apnea presents complaining of cough  for the last few days.  Says she was exposed to COVID and thought maybe was that but also admits could be her COPD because she does feel like she is wheezing.  No chest pain no nausea no vomiting, does not feel terribly short of breath and is more concerned with the coughing.  Denies fevers or chills nausea or vomiting.  Review of Systems   Constitutional:  Negative for fever.   HENT:  Positive for congestion.    Respiratory:  Positive for cough and shortness of breath.    Cardiovascular:  Negative for chest pain and leg swelling.       Past Medical History:   Diagnosis Date    Anxiety     Arthritis     Asthma     Constipation     COPD (chronic obstructive pulmonary disease)     DDD (degenerative disc disease), cervical     DDD (degenerative disc disease), lumbar     Disease of thyroid gland     Double vision 08/17/2023    Emphysema lung     Fibrocystic breast     H/O cervical spine surgery 08/17/2023    Hard of hearing     wears hearing aids bilat    History of cigarette smoking 08/17/2023    Hyperlipidemia     Hypertension     Lymphedema of both lower extremities     red and irritable    Medicare annual wellness visit, subsequent 08/21/2019    Obesity (BMI 30-39.9) 08/17/2023    PONV (postoperative nausea and vomiting)     Seasonal allergies     Sleep apnea     not formally dx- pt O2 drops to mid-70s while asleep    Urinary incontinence        Allergies   Allergen Reactions    Nsaids GI Bleeding    Lisinopril Cough       Past Surgical History:   Procedure Laterality Date    APPENDECTOMY  1958    AUGMENTATION MAMMAPLASTY      BREAST CYST ASPIRATION      BREAST CYST EXCISION      CERVICAL FUSION Right 7/31/2023    Procedure: DAY 1 -CERVICAL CORPECTOMY  LEVEL CERVICAL 5 WITH FUSION LEVELS CERVICAL 4-6 ANTERIOR;  Surgeon: Wilber Mcgowan MD;  Location: [facility] MAIN OR;  Service: Neurosurgery;   Laterality: Right;    CERVICAL FUSION Right 2023    Procedure: DAY 2: CERVICAL 4-6 LAMINECTOMY & POSTERIOR LATERAL FUSION WITH INSTRUMENTATION 3-4 LEVELS;  Surgeon: Wilber Mcgowan MD;  Location: [facility] MAIN OR;  Service: Neurosurgery;  Laterality: Right;    HYSTERECTOMY      THYROIDECTOMY, PARTIAL      TONSILLECTOMY         Family History   Problem Relation Age of Onset    Pancreatic cancer Mother     Stroke Father     Stroke Sister     Prostate cancer Brother        Social History     Socioeconomic History    Marital status:    Tobacco Use    Smoking status: Former     Current packs/day: 0.00     Average packs/day: 1.5 packs/day for 41.0 years (61.5 ttl pk-yrs)     Types: Cigarettes     Start date: 1965     Quit date: 2006     Years since quittin.6    Smokeless tobacco: Never   Vaping Use    Vaping status: Never Used   Substance and Sexual Activity    Alcohol use: Yes     Alcohol/week: 11.0 standard drinks of alcohol     Types: 4 Glasses of wine, 7 Shots of liquor per week     Comment: daily, vodka tonic x 8    Drug use: No    Sexual activity: Defer     Birth control/protection: Post-menopausal           Objective   Physical Exam  Nursing note reviewed.  INITIAL VITAL SIGNS: Reviewed by me.  Pulse ox normal  GENERAL: Alert and interactive. No acute distress.  HEAD: Head is normocephalic.  EYES: EOMI. PERRL. No scleral icterus. No conjunctival injection.  ENT: Moist mucous membranes.   NECK: Supple. Full range of motion.  RESPIRATORY: Mild tachypnea, mild to moderate decrease in breath sounds, diffuse expiratory wheezing  CV: Regular rate and rhythm. No murmurs. No rubs or gallops.  ABDOMEN: Soft, nondistended, nontender. No guarding. No rebound. No masses.   BACK:  No obvious deformity.  EXTREMITIES: No deformity. No clubbing or cyanosis. No edema.   SKIN: Warm and dry. No diaphoresis. No obvious rashes.   NEUROLOGIC: Alert and oriented. Face is symmetric. Speech is normal. Moves all  extremities equally. Motor and sensory distally intact.       Procedures     EKG         EKG time/Interp time: 0851/0900  Rhythm/Rate: Sinus rhythm rate of 89  P waves and WI: Normal WI interval  QRS, axis: Normal QRS duration with a left axis  ST and T waves: No elevations or depressions concerning for acute ischemia  Independently interpreted by me contemporaneously with treatment        ED Course  ED Course as of 09/06/24 1609   Fri Sep 06, 2024   0913 Patient comes in complaining of cough.  She says sometimes she is short of breath which wakes up at night figure that was her sleep apnea. [RO]   1018 Labs show patient has some low sodium and chloride and potassium.  I am giving her some oral potassium repletion and half a liter of LR.  Sodium and chloride are not much lower than prior labs.  Chest x-ray without evidence for pneumonia.Patient has improved air movement after DuoNeb and albuterol, she still has diffuse expiratory wheezing.  O2 sats still in the mid 80s on room air, will give another round of breathing treatments and if this is no better we will admit. [RO]   1148 Patient sounds somewhat better but still wheezy still hypoxic on room air, discussed admission with the patient, she is on the fence think she can just go home and do nebulizers.  Discussed with her the concept of the oxygen saturation and oxygen pressure curves how she is at the edge of a becky right now and the risk of hypoxemic loss of consciousness and death in an unmonitored setting.  She is talking on the phone with somebody right now to decide if she will stay or not. [RO]   1215 Patient willing to stay but she wants to be admitted to [facility].  Call put out to Thomas [RO]   1247 Discussed with Dr. Kelly at [facility] who accepts patient for further management. [RO]   1606 Patient now wants to leave A, I have informed her that even though she is better at rest her oxygen still goes down with any walking in the department.  She says that  she completely understands the risks of severe hypoxia including loss of consciousness, brain injury and death but she does not feel like she is at risk for these and says that she is fine.  She is pleasant but is not amenable to having her mind changed.  Will send in antibiotics, steroids and encouraged her to return to medical care if she has any problems [RO]      ED Course User Index  [RO] Harsha Guardado MD                                           Medical Decision Making  Problems Addressed:  Acute respiratory failure with hypoxia: complicated acute illness or injury  COPD exacerbation: complicated acute illness or injury  Hypokalemia: complicated acute illness or injury  Hypomagnesemia: complicated acute illness or injury    Amount and/or Complexity of Data Reviewed  Labs: ordered. Decision-making details documented in ED Course.  Radiology: ordered. Decision-making details documented in ED Course.  ECG/medicine tests: ordered and independent interpretation performed. Decision-making details documented in ED Course.  Discussion of management or test interpretation with external provider(s): Dr. Kelly at [facility]  Prescription drug management.  Decision regarding hospitalization.        Final diagnoses:   COPD exacerbation   Acute respiratory failure with hypoxia   Hypomagnesemia   Hypokalemia       ED Disposition  ED Disposition       ED Disposition   AMA    Condition   --    Comment   --               No follow-up provider specified.       Medication List        New Prescriptions      doxycycline 100 MG capsule  Commonly known as: MONODOX  Take 1 capsule by mouth 2 (Two) Times a Day for 7 days.     potassium chloride 20 MEQ CR tablet  Commonly known as: KLOR-CON M20  Take 1 tablet by mouth 2 (Two) Times a Day for 5 days.     predniSONE 20 MG tablet  Commonly known as: DELTASONE  Take 3 tablets by mouth Daily for 5 days.            Changed      metoprolol succinate XL 50 MG 24 hr tablet  Commonly  known as: Toprol XL  Take 1 tablet by mouth Daily.  What changed: how much to take               Where to Get Your Medications        These medications were sent to [id number] - [place], [place] - [address] AT [address] - [phone]  - [phone] FX  [address], [place] [address]      Phone: [phone]   doxycycline 100 MG capsule  potassium chloride 20 MEQ CR tablet  predniSONE 20 MG tablet

## 2024-09-23 RX ORDER — CITALOPRAM HYDROBROMIDE 20 MG/1
TABLET ORAL
Qty: 90 TABLET | Refills: 3 | Status: SHIPPED | OUTPATIENT
Start: 2024-09-23

## 2024-12-11 RX ORDER — LEVOTHYROXINE SODIUM 25 UG/1
25 TABLET ORAL DAILY
Qty: 90 TABLET | Refills: 0 | Status: SHIPPED | OUTPATIENT
Start: 2024-12-11

## 2025-01-13 ENCOUNTER — OFFICE VISIT (OUTPATIENT)
Dept: CARDIOLOGY | Facility: CLINIC | Age: 75
End: 2025-01-13
Payer: MEDICARE

## 2025-01-13 VITALS
HEART RATE: 80 BPM | SYSTOLIC BLOOD PRESSURE: 185 MMHG | BODY MASS INDEX: 33.31 KG/M2 | HEIGHT: 62 IN | WEIGHT: 181 LBS | OXYGEN SATURATION: 94 % | DIASTOLIC BLOOD PRESSURE: 100 MMHG

## 2025-01-13 DIAGNOSIS — I10 PRIMARY HYPERTENSION: Primary | ICD-10-CM

## 2025-01-13 DIAGNOSIS — I25.10 CORONARY ARTERY CALCIFICATION SEEN ON CAT SCAN: ICD-10-CM

## 2025-01-13 DIAGNOSIS — I50.32 CHRONIC HEART FAILURE WITH PRESERVED EJECTION FRACTION: ICD-10-CM

## 2025-01-13 DIAGNOSIS — E66.9 OBESITY (BMI 30-39.9): Chronic | ICD-10-CM

## 2025-01-13 DIAGNOSIS — R60.0 EDEMA LEG: ICD-10-CM

## 2025-01-13 DIAGNOSIS — E78.2 MIXED HYPERLIPIDEMIA: ICD-10-CM

## 2025-01-13 RX ORDER — HYDRALAZINE HYDROCHLORIDE 50 MG/1
50 TABLET, FILM COATED ORAL 3 TIMES DAILY PRN
Start: 2025-01-13

## 2025-01-13 RX ORDER — PREDNISOLONE/MOXIFLOXACIN HCL 1 %-0.5 %
1 SUSPENSION, DROPS(FINAL DOSAGE FORM)(ML) OPHTHALMIC (EYE) 4 TIMES DAILY
COMMUNITY
Start: 2024-12-19

## 2025-01-13 RX ORDER — HYDROCHLOROTHIAZIDE 25 MG/1
25 TABLET ORAL DAILY
COMMUNITY
Start: 2024-12-28

## 2025-01-13 NOTE — PROGRESS NOTES
Subjective:     Encounter Date:01/13/2025      Patient ID: Lori Sutton is a 74 y.o. female.    Chief Complaint: 6 month follow up hypertension, HFpEF     History of Present Illness    Ms. Lori Devine has PMH of     - CAD by CT scan   -Hypertension with hypertensive cardiovascular disease/HFpEF  -Dyslipidemia  - hypothyroidism   - reactive airway disease   -Hysterectomy, Appendectomy, cervical fusion, partial thyroidectomy, tonsillectomy, breast cyst excision, breast augmentation,   -Allergies/intolerance to NSAIDs  -Former smoker  - lymphedema   - GERD, diverticulitis       Here for 6 month follow up.  Patient denies any chest pain or current shortness of breath.  She does report chronic dyspnea on exertion and chronic lower extremity edema.  She reports that she is supposed to be wearing compression socks but she cannot get them on by herself and she does not have anyone to help her daily.  She feels like her shortness of breath is due to her COPD/asthma.      Blood pressure in office today 182/93 left arm right arm 185/100 patient reports that she has hydralazine to take as needed for blood pressure over 160 but has not been taking her blood pressure at home as instructed.  She also does not take her Lasix daily only as needed    Patient was seen at urgent care in September with significant electrolyte abnormalities with low potassium and low magnesium she reports that she has been taking the supplements however she does have chronic diarrhea    5/27/2023 Stress Cardiolite results with patient which was negative for ischemia EF of 55%.     5/30/2023 Echo results which revealed normal LV systolic function.      Lab Review:   labs from 9/6/2024 sodium 138 potassium 2.9 CO2 36.7 BUN 7 creatinine 0.56 glucose 145 magnesium 1.0 WBC 11.12 hemoglobin 11.7    The following portions of the patient's history were reviewed and updated as appropriate: allergies, current medications, past family history, past  medical history, past social history, past surgical history, and problem list.    Review of Systems   Constitutional: Negative for malaise/fatigue.   Cardiovascular:  Positive for dyspnea on exertion and leg swelling. Negative for chest pain and palpitations.   Respiratory:  Negative for cough and shortness of breath.    Gastrointestinal:  Negative for abdominal pain, nausea and vomiting.   Neurological:  Negative for dizziness, focal weakness, headaches, light-headedness and numbness.   All other systems reviewed and are negative.        Past Medical History:   Diagnosis Date    Anxiety     Arthritis     Asthma     Constipation     COPD (chronic obstructive pulmonary disease)     DDD (degenerative disc disease), cervical     DDD (degenerative disc disease), lumbar     Disease of thyroid gland     Double vision 08/17/2023    Emphysema lung     Fibrocystic breast     H/O cervical spine surgery 08/17/2023    Hard of hearing     wears hearing aids bilat    History of cigarette smoking 08/17/2023    Hyperlipidemia     Hypertension     Lymphedema of both lower extremities     red and irritable    Medicare annual wellness visit, subsequent 08/21/2019    Obesity (BMI 30-39.9) 08/17/2023    PONV (postoperative nausea and vomiting)     Seasonal allergies     Sleep apnea     not formally dx- pt O2 drops to mid-70s while asleep    Urinary incontinence      Past Surgical History:   Procedure Laterality Date    APPENDECTOMY  1958    AUGMENTATION MAMMAPLASTY      BREAST CYST ASPIRATION      BREAST CYST EXCISION      CERVICAL FUSION Right 7/31/2023    Procedure: DAY 1 -CERVICAL CORPECTOMY  LEVEL CERVICAL 5 WITH FUSION LEVELS CERVICAL 4-6 ANTERIOR;  Surgeon: Wilber Mcgowan MD;  Location: [facility] OR;  Service: Neurosurgery;  Laterality: Right;    CERVICAL FUSION Right 8/2/2023    Procedure: DAY 2: CERVICAL 4-6 LAMINECTOMY & POSTERIOR LATERAL FUSION WITH INSTRUMENTATION 3-4 LEVELS;  Surgeon: Wilber Mcgowan MD;  Location:   "[facility];  Service: Neurosurgery;  Laterality: Right;    HYSTERECTOMY      THYROIDECTOMY, PARTIAL      TONSILLECTOMY  1958     BP (!) 185/100 (BP Location: Right arm, Patient Position: Sitting, Cuff Size: Adult)   Pulse 80   Ht 157.5 cm (62\")   Wt 82.1 kg (181 lb)   SpO2 94%   BMI 33.11 kg/m²   Family History   Problem Relation Age of Onset    Pancreatic cancer Mother     Stroke Father     Stroke Sister     Prostate cancer Brother        Current Outpatient Medications:     acetaminophen (TYLENOL) 500 MG tablet, Take 1 tablet by mouth Every 6 (Six) Hours As Needed for Moderate Pain or Mild Pain., Disp: 30 tablet, Rfl: 0    ALBUTEROL SULFATE  (90 Base) MCG/ACT inhaler, INHALE 2 PUFFS BY MOUTH EVERY 4 HOURS AS NEEDED FOR WHEEZING, Disp: 42.5 g, Rfl: 2    allopurinol (ZYLOPRIM) 300 MG tablet, TAKE 1 TABLET BY MOUTH  DAILY, Disp: 90 tablet, Rfl: 3    aspirin 81 MG EC tablet, Take 1 tablet by mouth Daily., Disp: 30 tablet, Rfl: 0    atorvastatin (Lipitor) 40 MG tablet, Take 1 tablet by mouth Every Night., Disp: 30 tablet, Rfl: 0    Cholecalciferol 1000 units tablet, Take 1 tablet by mouth Daily., Disp: , Rfl:     citalopram (CeleXA) 20 MG tablet, TAKE 1 TABLET BY MOUTH DAILY, Disp: 90 tablet, Rfl: 3    furosemide (LASIX) 40 MG tablet, Take 1 tablet by mouth As Needed., Disp: , Rfl:     hydrALAZINE (APRESOLINE) 50 MG tablet, Take 1 tablet by mouth 3 (Three) Times a Day As Needed (for blood pressure over 160)., Disp: , Rfl:     hydroCHLOROthiazide 25 MG tablet, Take 1 tablet by mouth Daily., Disp: , Rfl:     levothyroxine (SYNTHROID, LEVOTHROID) 25 MCG tablet, TAKE 1 TABLET BY MOUTH DAILY, Disp: 90 tablet, Rfl: 0    lidocaine (LIDODERM) 5 %, Place 1-3 patches on the skin as directed by provider Daily., Disp: , Rfl:     metoprolol succinate XL (Toprol XL) 50 MG 24 hr tablet, Take 1 tablet by mouth Daily. (Patient taking differently: Take 2 tablets by mouth Daily.), Disp: 30 tablet, Rfl: 0    montelukast " (SINGULAIR) 10 MG tablet, TAKE 1 TABLET BY MOUTH  DAILY, Disp: 90 tablet, Rfl: 3    omeprazole (priLOSEC) 40 MG capsule, Take 1 capsule by mouth., Disp: , Rfl:     potassium chloride 10 MEQ CR tablet, Take 2 tablets by mouth Daily., Disp: , Rfl:     PrednisoLONE Acet-Moxifloxacin (prednisoLONE-Moxifloxacin) 1-0.5 % suspension ophthalmic suspension, Apply 1 drop to eye(s) as directed by provider 4 (Four) Times a Day., Disp: , Rfl:     Restasis 0.05 % ophthalmic emulsion, Administer 1 drop to both eyes Daily As Needed., Disp: , Rfl:     Symbicort 80-4.5 MCG/ACT inhaler, USE 2 INHALATIONS BY MOUTH TWICE DAILY, Disp: 30.6 g, Rfl: 3    triamcinolone (KENALOG) 0.5 % ointment, APPLY TOPICALLY TO THE  APPROPRIATE AREA AS  DIRECTED TWICE DAILY, Disp: 90 g, Rfl: 1      Allergies   Allergen Reactions    Nsaids GI Bleeding    Lisinopril Cough     Social History     Socioeconomic History    Marital status:    Tobacco Use    Smoking status: Former     Current packs/day: 0.00     Average packs/day: 1.5 packs/day for 41.0 years (61.5 ttl pk-yrs)     Types: Cigarettes     Start date: 1965     Quit date: 2006     Years since quittin.0     Passive exposure: Past    Smokeless tobacco: Never   Vaping Use    Vaping status: Never Used   Substance and Sexual Activity    Alcohol use: Yes     Alcohol/week: 11.0 standard drinks of alcohol     Types: 4 Glasses of wine, 7 Shots of liquor per week     Comment: daily, vodka tonic x 8    Drug use: No    Sexual activity: Defer     Birth control/protection: Post-menopausal                Objective:     Vitals reviewed.   Constitutional:       Appearance: Healthy appearance. Well-groomed and not in distress. Obese.   Neck:      Vascular: No JVR. JVD normal.   Pulmonary:      Effort: Pulmonary effort is normal.      Breath sounds: Examination of the left-upper field reveals wheezing. Wheezing present. No rhonchi. No rales.   Chest:      Chest wall: Not tender to palpatation.    Cardiovascular:      PMI at left midclavicular line. Normal rate. Regular rhythm. Normal S1. Normal S2.       Murmurs: There is no murmur.      No gallop.  No click. No rub.   Pulses:     Intact distal pulses.   Edema:     Peripheral edema present.     Ankle: bilateral 1+ edema of the ankle.     Feet: bilateral 1+ edema of the feet.  Abdominal:      General: Bowel sounds are normal.      Palpations: Abdomen is soft.      Tenderness: There is no abdominal tenderness.   Musculoskeletal: Normal range of motion.         General: No tenderness. Skin:     General: Skin is warm and dry.   Neurological:      General: No focal deficit present.      Mental Status: Alert and oriented to person, place and time.       Procedures                  Assessment:     MDM       Diagnosis Plan   1. Primary hypertension  Comprehensive Metabolic Panel    Magnesium    BNP      2. Edema leg        3. Mixed hyperlipidemia        4. Chronic heart failure with preserved ejection fraction        5. Coronary artery calcification seen on CAT scan        6. Obesity (BMI 30-39.9)                       Plan:   Chart reviewed  Labs reviewed  Previous stress test normal  Previous echocardiogram with normal LV function she likely has some component of diastolic dysfunction due to hypertensive cardiovascular disease  Discussed importance of keeping blood pressure monitored and controlled.  Patient reports she has hydralazine at home to take as needed if her blood pressure is over 160 I advised that she needs to be checking her blood pressure and taking this if indeed her blood pressures over 160.  She states that she is going to recheck her blood pressure take it as soon as she gets home.    Continue aspirin statin beta-blocker for CAD  Continue metoprolol 50 mg daily, Dralzine 25 mg daily Lasix as needed and hydralazine if blood pressure over 160 she may need to start taking this scheduled at least twice daily    Advised patient that she needs to  follow-up with her PCP regarding her electrolyte abnormalities from September  I did order repeat labs including CMP magnesium and a BNP because she is swollen advised that she take Lasix today and take an extra potassium  This will help with her blood pressure as well    Reports that she is taking magnesium supplement over-the-counter            This document is intended for medical expert use only.  Reading of this document by patients and/or patient's family without participating medical staff guidance may result in misinterpretation and unintended morbidity. Any interpretation of such data is the responsibility of the patient and/or family member responsible for the patient in concert with their primary or specialist providers, not to be left for sources of online search as such as [employer], [employer] or similar queries.  Relying on these approaches to knowledge may result in misinterpretation, misguided goals of care and even death should patient or family members try recommendations outside of the realm of professional medical care in a supervised inpatient environment.

## 2025-01-13 NOTE — PATIENT INSTRUCTIONS
Monitor blood pressure for 2 weeks and call with readings     Take hydralazine if blood pressure over 160    Take lasix daily    Get CMP to check kidney function, potassium, magnesium   If potassium is still low increase to 2 pills daily     Take magnesium oxide

## 2025-01-29 RX ORDER — LEVOTHYROXINE SODIUM 25 UG/1
25 TABLET ORAL DAILY
Qty: 90 TABLET | Refills: 3 | Status: SHIPPED | OUTPATIENT
Start: 2025-01-29

## 2025-05-14 ENCOUNTER — TELEPHONE (OUTPATIENT)
Dept: CARDIOLOGY | Facility: CLINIC | Age: 75
End: 2025-05-14
Payer: MEDICARE

## 2025-05-20 ENCOUNTER — LAB (OUTPATIENT)
Dept: LAB | Facility: HOSPITAL | Age: 75
End: 2025-05-20
Payer: MEDICARE

## 2025-05-20 DIAGNOSIS — I10 PRIMARY HYPERTENSION: ICD-10-CM

## 2025-05-20 LAB
ALBUMIN SERPL-MCNC: 4.2 G/DL (ref 3.5–5.2)
ALBUMIN/GLOB SERPL: 1.5 G/DL
ALP SERPL-CCNC: 84 U/L (ref 39–117)
ALT SERPL W P-5'-P-CCNC: 15 U/L (ref 1–33)
ANION GAP SERPL CALCULATED.3IONS-SCNC: 15 MMOL/L (ref 5–15)
AST SERPL-CCNC: 27 U/L (ref 1–32)
BILIRUB SERPL-MCNC: 0.6 MG/DL (ref 0–1.2)
BUN SERPL-MCNC: 9 MG/DL (ref 8–23)
BUN/CREAT SERPL: 13.8 (ref 7–25)
CALCIUM SPEC-SCNC: 9.8 MG/DL (ref 8.6–10.5)
CHLORIDE SERPL-SCNC: 97 MMOL/L (ref 98–107)
CO2 SERPL-SCNC: 26 MMOL/L (ref 22–29)
CREAT SERPL-MCNC: 0.65 MG/DL (ref 0.57–1)
EGFRCR SERPLBLD CKD-EPI 2021: 90.8 ML/MIN/1.73
GLOBULIN UR ELPH-MCNC: 2.8 GM/DL
GLUCOSE SERPL-MCNC: 144 MG/DL (ref 65–99)
MAGNESIUM SERPL-MCNC: 1.9 MG/DL (ref 1.6–2.4)
NT-PROBNP SERPL-MCNC: 158 PG/ML (ref 0–1800)
POTASSIUM SERPL-SCNC: 4 MMOL/L (ref 3.5–5.2)
PROT SERPL-MCNC: 7 G/DL (ref 6–8.5)
SODIUM SERPL-SCNC: 138 MMOL/L (ref 136–145)

## 2025-05-20 PROCEDURE — 36415 COLL VENOUS BLD VENIPUNCTURE: CPT

## 2025-05-20 PROCEDURE — 83880 ASSAY OF NATRIURETIC PEPTIDE: CPT

## 2025-05-20 PROCEDURE — 83735 ASSAY OF MAGNESIUM: CPT

## 2025-05-20 PROCEDURE — 80053 COMPREHEN METABOLIC PANEL: CPT

## 2025-05-29 ENCOUNTER — OFFICE VISIT (OUTPATIENT)
Dept: CARDIOLOGY | Facility: CLINIC | Age: 75
End: 2025-05-29
Payer: MEDICARE

## 2025-05-29 VITALS
DIASTOLIC BLOOD PRESSURE: 88 MMHG | OXYGEN SATURATION: 93 % | HEART RATE: 77 BPM | WEIGHT: 177 LBS | SYSTOLIC BLOOD PRESSURE: 141 MMHG | BODY MASS INDEX: 32.57 KG/M2 | HEIGHT: 62 IN

## 2025-05-29 DIAGNOSIS — E66.9 OBESITY (BMI 30-39.9): ICD-10-CM

## 2025-05-29 DIAGNOSIS — I50.32 HYPERTENSIVE HEART DISEASE WITH CHRONIC DIASTOLIC CONGESTIVE HEART FAILURE: Primary | ICD-10-CM

## 2025-05-29 DIAGNOSIS — E78.5 DYSLIPIDEMIA: ICD-10-CM

## 2025-05-29 DIAGNOSIS — R73.9 HYPERGLYCEMIA: ICD-10-CM

## 2025-05-29 DIAGNOSIS — R06.09 DYSPNEA ON EXERTION: ICD-10-CM

## 2025-05-29 DIAGNOSIS — I11.0 HYPERTENSIVE HEART DISEASE WITH CHRONIC DIASTOLIC CONGESTIVE HEART FAILURE: Primary | ICD-10-CM

## 2025-05-29 PROCEDURE — 93000 ELECTROCARDIOGRAM COMPLETE: CPT | Performed by: INTERNAL MEDICINE

## 2025-05-29 PROCEDURE — 99214 OFFICE O/P EST MOD 30 MIN: CPT | Performed by: INTERNAL MEDICINE

## 2025-05-29 PROCEDURE — 1160F RVW MEDS BY RX/DR IN RCRD: CPT | Performed by: INTERNAL MEDICINE

## 2025-05-29 PROCEDURE — 3077F SYST BP >= 140 MM HG: CPT | Performed by: INTERNAL MEDICINE

## 2025-05-29 PROCEDURE — 1159F MED LIST DOCD IN RCRD: CPT | Performed by: INTERNAL MEDICINE

## 2025-05-29 PROCEDURE — 3079F DIAST BP 80-89 MM HG: CPT | Performed by: INTERNAL MEDICINE

## 2025-05-29 RX ORDER — METOPROLOL SUCCINATE 100 MG/1
100 TABLET, EXTENDED RELEASE ORAL DAILY
Qty: 90 TABLET | Refills: 3 | Status: SHIPPED | OUTPATIENT
Start: 2025-05-29

## 2025-05-29 NOTE — PROGRESS NOTES
Subjective:     Encounter Date:05/29/2025      Patient ID: Lori Sutton is a 75 y.o. female.    Chief Complaint and history of present illness:       Follow-up for hypertensive cardiovascular disease with CHF,, dyslipidemia, obesity with BMI over 30, hyperglycemia        History of Present Illness:       Ms. Lori Devine has PMH of     -Hypertension with hypertensive cardiovascular disease/HFpEF  -Dyslipidemia  -Hysterectomy, Appendectomy  -Allergies/intolerance to NSAIDs  -Former smoker     Here for  follow-up.  Patient denies any chest pain or shortness of breath.  Is complaining of weakness and lymphedema.    Patient's arterial blood pressure was 141/88, heart rate 77, O2 sat of 93 % on room air.   BMI is over 30.     Patient's medication list included aspirin, atorvastatin, furosemide, hydralazine, metoprolol       Review of records :     [facility] 9/8/2022 were normal.     Labs from 9/7/2022 reveal CMP with a glucose of 104, potassium 3.5, CBC with a hemoglobin of 12.4.  Cholesterol 238, triglycerides 124, HDL 80, LDL 86..  Labs from 4/24/23 revealed normal proBNP of 500, UA without proteinuria, BMP with normal creatinine and elevated glucose.  Labs from 2/22/2024 revealed BMP with a glucose of 122.  Labs from 5/28/2025 reveal normal proBNP of 158.  CMP with a glucose of 144.  Normal BUN/creatinine 9 and 0.65 and potassium at 4.8        Assessment:  :        Fatigue, leg edema  Hypertensive cardiovascular disease with chronic diastolic heart failure due to preserved EF  Dyslipidemia  Obesity with BMI over 30  Hyperglycemia     Recommendations / Plan:         Reviewed EKG results with patient.    Patient's blood pressure is elevated.  Will increase metoprolol to 100 mg.  Continue medical management with aspirin, atorvastatin, furosemide, metoprolol as tolerated.  Patient is taking hydralazine as needed for elevated blood pressure at home.  Reviewed BMI over 30, counseled on weight loss diet and  exercise.  Follow-up with PMD for labs.      Records:  Stress Cardiolite 5/25/2023 results with patient which was negative for ischemia EF of 55%.     Echo 5/25/2020  revealed normal LV systolic function.        Procedures:  Patient underwent echocardiogram at [facility] 10/20/2023 which revealed EF of 58% with diastolic dysfunction           ECG 12 Lead    Date/Time: 5/29/2025 2:58 PM  Performed by: Diego Garza MD    Authorized by: Diego Garza MD  Comparison: compared with previous ECG from 9/6/2024  Comparison to previous ECG: EKG done today reviewed/interpreted by me reveals sinus rhythm with a rate of 74 bpm with poor R wave progression, no significant change compared to EKG from 9/6/2024          ECHOCARDIOGRAM:  Results for orders placed during the hospital encounter of 05/25/23    Adult Transthoracic Echo Complete W/ Cont if Necessary Per Protocol    Interpretation Summary  Normal LV size and contractility EF of 60 to 65%  Normal RV size  Normal atrial size  Pulmonic valve is not well visualized.  Aortic valve appears mildly calcified and thickened but has adequate cusp separation.  Mild mitral annular calcification seen.  Mild mitral regurgitation seen.  Tricuspid valve appears structurally normal, no significant regurgitation seen.  No pericardial effusion seen.  Proximal aorta appears normal in size.      STRESS TEST  Results for orders placed during the hospital encounter of 05/25/23    Stress Test With Myocardial Perfusion One Day    Interpretation Summary  LEXISCAN CARDIOLITE REPORT    DATE OF PROCEDURE:  05/25/23    INDICATION FOR PROCEDURE: Dyspnea on exertion, edema, preop cardiovascular examination, hypertensive cardiovascular disease with chronic diastolic heart failure, obesity with BMI over 30, dyslipidemia    PROCEDURE PERFORMED: Lexiscan Cardiolite    PROCEDURE COMMENTS:    After informed consent was obtained.  Patient's resting heart rate was 80 bpm, resting blood  pressure was 124/80, resting EKG revealed sinus rhythm with rate of 81 bpm with Q waves in V1 V2 and poor R wave progression..  Patient was given 0.4 mg of regadenosine for stress testing.  There was no significant change in heart rate, blood pressure, symptoms with regadenoson injection.  Patient tolerated procedure well.  Complications were none.    NUCLEAR IMAGIN.  There was  uniform uptake of Cardiolite both in resting and post stress images, no ischemia seen.  2.  Gated images reveal  normal LV size and contractility, LVEF of 55%.    CONCLUSION:  1.  Lexiscan Cardiolite with normal perfusion, negative for ischemia.  2.  Normal wall motion.  LVEF of 55%.    RECOMMENDATIONS:    Clinical correlation recommended.      Diego Garza MD  23  18:41 EDT          HEART CATHETERIZATION  No results found for this or any previous visit.      Copied text in this portion of the note has been reviewed and is accurate as of 2025  The following portions of the patient's history were reviewed and updated as appropriate: allergies, current medications, past family history, past medical history, past social history, past surgical history and problem list.    Assessment:         [facility]       Diagnosis Plan   1. Hypertensive heart disease with chronic diastolic congestive heart failure  ECG 12 Lead      2. Dyspnea on exertion        3. Dyslipidemia  ECG 12 Lead      4. Obesity (BMI 30-39.9)  ECG 12 Lead      5. Hyperglycemia  ECG 12 Lead             Plan:               Past Medical History:  Past Medical History:   Diagnosis Date    Anxiety     Arthritis     Asthma     Constipation     COPD (chronic obstructive pulmonary disease)     DDD (degenerative disc disease), cervical     DDD (degenerative disc disease), lumbar     Difficulty walking no confidence in walking    Disease of thyroid gland     Double vision 2023    Emphysema lung     Emphysema of lung     Fibrocystic breast     H/O cervical spine  surgery 08/17/2023    Hard of hearing     wears hearing aids bilat    History of cigarette smoking 08/17/2023    Hyperlipidemia     Hypertension     Lymphedema of both lower extremities     red and irritable    Medicare annual wellness visit, subsequent 08/21/2019    Obesity (BMI 30-39.9) 08/17/2023    PONV (postoperative nausea and vomiting)     Seasonal allergies     Shortness of breath     Sleep apnea     not formally dx- pt O2 drops to mid-70s while asleep    Urinary incontinence      Past Surgical History:  Past Surgical History:   Procedure Laterality Date    APPENDECTOMY  1958    AUGMENTATION MAMMAPLASTY      BREAST CYST ASPIRATION      BREAST CYST EXCISION      CERVICAL FUSION Right 07/31/2023    Procedure: DAY 1 -CERVICAL CORPECTOMY  LEVEL CERVICAL 5 WITH FUSION LEVELS CERVICAL 4-6 ANTERIOR;  Surgeon: Wilber Mcgowan MD;  Location: [facility] OR;  Service: Neurosurgery;  Laterality: Right;    CERVICAL FUSION Right 08/02/2023    Procedure: DAY 2: CERVICAL 4-6 LAMINECTOMY & POSTERIOR LATERAL FUSION WITH INSTRUMENTATION 3-4 LEVELS;  Surgeon: Wilber Mcgowan MD;  Location: [facility] OR;  Service: Neurosurgery;  Laterality: Right;    HYSTERECTOMY      THYROIDECTOMY, PARTIAL      TONSILLECTOMY  1958    TUBAL ABDOMINAL LIGATION        Allergies:  Allergies   Allergen Reactions    Nsaids GI Bleeding    Lisinopril Cough     Home Meds:  Current Meds:     Current Outpatient Medications:     acetaminophen (TYLENOL) 500 MG tablet, Take 1 tablet by mouth Every 6 (Six) Hours As Needed for Moderate Pain or Mild Pain., Disp: 30 tablet, Rfl: 0    ALBUTEROL SULFATE  (90 Base) MCG/ACT inhaler, INHALE 2 PUFFS BY MOUTH EVERY 4 HOURS AS NEEDED FOR WHEEZING, Disp: 42.5 g, Rfl: 2    allopurinol (ZYLOPRIM) 300 MG tablet, TAKE 1 TABLET BY MOUTH  DAILY, Disp: 90 tablet, Rfl: 3    aspirin 81 MG EC tablet, Take 1 tablet by mouth Daily., Disp: 30 tablet, Rfl: 0    atorvastatin (Lipitor) 40 MG tablet, Take 1 tablet by mouth  Every Night., Disp: 30 tablet, Rfl: 0    Cholecalciferol 1000 units tablet, Take 1 tablet by mouth Daily., Disp: , Rfl:     citalopram (CeleXA) 20 MG tablet, TAKE 1 TABLET BY MOUTH DAILY, Disp: 90 tablet, Rfl: 3    furosemide (LASIX) 40 MG tablet, Take 1 tablet by mouth As Needed., Disp: , Rfl:     hydrALAZINE (APRESOLINE) 50 MG tablet, Take 1 tablet by mouth 3 (Three) Times a Day As Needed (for blood pressure over 160)., Disp: , Rfl:     levothyroxine (SYNTHROID, LEVOTHROID) 25 MCG tablet, TAKE 1 TABLET BY MOUTH DAILY, Disp: 90 tablet, Rfl: 3    lidocaine (LIDODERM) 5 %, Place 1-3 patches on the skin as directed by provider Daily., Disp: , Rfl:     metoprolol succinate XL (Toprol XL) 100 MG 24 hr tablet, Take 1 tablet by mouth Daily., Disp: 90 tablet, Rfl: 3    montelukast (SINGULAIR) 10 MG tablet, TAKE 1 TABLET BY MOUTH  DAILY, Disp: 90 tablet, Rfl: 3    omeprazole (priLOSEC) 40 MG capsule, Take 1 capsule by mouth., Disp: , Rfl:     potassium chloride 10 MEQ CR tablet, Take 2 tablets by mouth Daily., Disp: , Rfl:     PrednisoLONE Acet-Moxifloxacin (prednisoLONE-Moxifloxacin) 1-0.5 % suspension ophthalmic suspension, Apply 1 drop to eye(s) as directed by provider 4 (Four) Times a Day., Disp: , Rfl:     Restasis 0.05 % ophthalmic emulsion, Administer 1 drop to both eyes Daily As Needed., Disp: , Rfl:     Symbicort 80-4.5 MCG/ACT inhaler, USE 2 INHALATIONS BY MOUTH TWICE DAILY, Disp: 30.6 g, Rfl: 3    triamcinolone (KENALOG) 0.5 % ointment, APPLY TOPICALLY TO THE  APPROPRIATE AREA AS  DIRECTED TWICE DAILY, Disp: 90 g, Rfl: 1  Social History:   Social History     Tobacco Use    Smoking status: Former     Current packs/day: 0.00     Average packs/day: 1.5 packs/day for 41.0 years (61.5 ttl pk-yrs)     Types: Cigarettes     Start date: 1965     Quit date: 2006     Years since quittin.4     Passive exposure: Past    Smokeless tobacco: Never   Substance Use Topics    Alcohol use: Yes     Alcohol/week: 11.0  "standard drinks of alcohol     Types: 4 Glasses of wine, 7 Shots of liquor per week     Comment: daily, vodka tonic x 8      Family History:  Family History   Problem Relation Age of Onset    Pancreatic cancer Mother     Arthritis Mother     Cancer Mother     Stroke Father     Stroke Sister     Cancer Sister     Prostate cancer Brother     Diabetes Maternal Grandmother     Cancer Maternal Aunt     Hyperlipidemia Sister               Review of Systems   Constitutional: Positive for malaise/fatigue.   Cardiovascular:  Positive for dyspnea on exertion and leg swelling. Negative for chest pain and palpitations.   Respiratory:  Negative for shortness of breath.    Skin:  Negative for rash.   Neurological:  Negative for dizziness, light-headedness and numbness.     All other systems are negative         Objective:     Physical Exam  /88   Pulse 77   Ht 157.5 cm (62\")   Wt 80.3 kg (177 lb)   SpO2 93%   BMI 32.37 kg/m²   General:  Appears in no acute distress  Eyes: Sclera is anicteric,  conjunctiva is clear   HEENT:  No JVD.  No carotid bruits  Respiratory: Respirations regular and unlabored at rest.  Clear to auscultation  Cardiovascular: S1,S2 Regular rate and rhythm. .   Extremities: No digital clubbing or cyanosis, no edema  Skin: Color pink. Skin warm and dry to touch. No rashes  No xanthoma  Neuro: Alert and awake.    Lab Reviewed:         Diego Garza MD  5/29/2025 15:19 EDT      EMR Dragon/Transcription:   \"Dictated utilizing Dragon dictation\".        "

## 2025-07-21 RX ORDER — CITALOPRAM HYDROBROMIDE 20 MG/1
20 TABLET ORAL DAILY
Qty: 90 TABLET | Refills: 3 | OUTPATIENT
Start: 2025-07-21

## (undated) DEVICE — ELECTRD BLD EZ CLN MOD 4IN

## (undated) DEVICE — SMOKE EVACUATION TUBING WITH 7/8 IN TO 1/4 IN REDUCER: Brand: BUFFALO FILTER

## (undated) DEVICE — UNDERGLV SURG BIOGEL INDICAT PI SZ8 BLU

## (undated) DEVICE — ANTIBACTERIAL UNDYED BRAIDED (POLYGLACTIN 910), SYNTHETIC ABSORBABLE SUTURE: Brand: COATED VICRYL

## (undated) DEVICE — TUBING, SUCTION, 1/4" X 12', STRAIGHT: Brand: MEDLINE

## (undated) DEVICE — SOL IRRIG NACL 1000ML

## (undated) DEVICE — DRN WND EVAC BULB 100CC

## (undated) DEVICE — C-ARM: Brand: UNBRANDED

## (undated) DEVICE — PENCL HND ROCKRSWTCH HOLSTR EZ CLEAN TP CRD 10FT

## (undated) DEVICE — Device

## (undated) DEVICE — DRN WND FLT FUL PERF NO/TROC 7MM

## (undated) DEVICE — DRP OPMI 326071

## (undated) DEVICE — SUT VIC 2/0 CT1 CR8 18IN J839D

## (undated) DEVICE — SPONGE,NEURO,1"X1",XR,STRL,LF,10/PK: Brand: MEDLINE

## (undated) DEVICE — DECANTER: Brand: UNBRANDED

## (undated) DEVICE — DISTRACT SCRW 12MM STRL

## (undated) DEVICE — KT SURG TURNOVER 050

## (undated) DEVICE — COVADERM: Brand: DEROYAL

## (undated) DEVICE — SLV SCD CALF HEMOFORCE DVT THERP REPROC MD

## (undated) DEVICE — SPONGE,DISSECTOR,ROUND CHERRY,XR,ST,5/PK: Brand: MEDLINE

## (undated) DEVICE — INTENDED FOR TISSUE SEPARATION, AND OTHER PROCEDURES THAT REQUIRE A SHARP SURGICAL BLADE TO PUNCTURE OR CUT.: Brand: BARD-PARKER ® CARBON RIB-BACK BLADES

## (undated) DEVICE — 3.0MM PRECISION NEURO (MATCH HEAD)

## (undated) DEVICE — SOLUTION,WATER,IRRIGATION,1000ML,STERILE: Brand: MEDLINE

## (undated) DEVICE — GLV SURG BIOGEL LTX PF 8

## (undated) DEVICE — ELECTRD BLD EZ CLN STD 6.5IN

## (undated) DEVICE — ADHS SKIN PREMIERPRO EXOFIN TOPICAL HI/VISC .5ML

## (undated) DEVICE — MARKR SKIN 2TP W/RULR

## (undated) DEVICE — SHEET, DRAPE, SPLIT, STERILE: Brand: MEDLINE

## (undated) DEVICE — SCRW POLY SPINE/OCT SYMPHONY 3.5X14MM
Type: IMPLANTABLE DEVICE | Site: SPINE CERVICAL | Status: NON-FUNCTIONAL
Removed: 2023-08-02

## (undated) DEVICE — DRP C/ARMOR

## (undated) DEVICE — PK BASIC SPINE 50

## (undated) DEVICE — PICO 7 10CM X 20CM: Brand: PICO™ 7

## (undated) DEVICE — TOWEL,OR,DSP,ST,NATURAL,DLX,4/PK,20PK/CS: Brand: MEDLINE